# Patient Record
Sex: FEMALE | Race: WHITE | Employment: FULL TIME | URBAN - METROPOLITAN AREA
[De-identification: names, ages, dates, MRNs, and addresses within clinical notes are randomized per-mention and may not be internally consistent; named-entity substitution may affect disease eponyms.]

---

## 2019-01-10 LAB
ALBUMIN SERPL-MCNC: NORMAL G/DL
ALP BLD-CCNC: NORMAL U/L
ALT SERPL-CCNC: NORMAL U/L
ANION GAP SERPL CALCULATED.3IONS-SCNC: NORMAL MMOL/L
AST SERPL-CCNC: NORMAL U/L
AVERAGE GLUCOSE: NORMAL
BILIRUB SERPL-MCNC: NORMAL MG/DL (ref 0.1–1.4)
BUN BLDV-MCNC: NORMAL MG/DL
CALCIUM SERPL-MCNC: NORMAL MG/DL
CHLORIDE BLD-SCNC: NORMAL MMOL/L
CHOLESTEROL, TOTAL: NORMAL MG/DL
CHOLESTEROL/HDL RATIO: NORMAL
CO2: NORMAL MMOL/L
CREAT SERPL-MCNC: NORMAL MG/DL
GFR CALCULATED: NORMAL
GLUCOSE BLD-MCNC: NORMAL MG/DL
HBA1C MFR BLD: 5.2 %
HDLC SERPL-MCNC: NORMAL MG/DL (ref 35–70)
LDL CHOLESTEROL CALCULATED: NORMAL MG/DL (ref 0–160)
POTASSIUM SERPL-SCNC: NORMAL MMOL/L
SODIUM BLD-SCNC: NORMAL MMOL/L
TOTAL PROTEIN: NORMAL
TRIGL SERPL-MCNC: NORMAL MG/DL
VLDLC SERPL CALC-MCNC: NORMAL MG/DL

## 2019-08-09 LAB — HIV AG/AB: NORMAL

## 2019-10-15 ENCOUNTER — OFFICE VISIT (OUTPATIENT)
Dept: INTERNAL MEDICINE CLINIC | Age: 59
End: 2019-10-15
Payer: COMMERCIAL

## 2019-10-15 VITALS
SYSTOLIC BLOOD PRESSURE: 138 MMHG | HEART RATE: 88 BPM | WEIGHT: 155.6 LBS | RESPIRATION RATE: 16 BRPM | DIASTOLIC BLOOD PRESSURE: 72 MMHG | OXYGEN SATURATION: 98 % | HEIGHT: 61 IN | BODY MASS INDEX: 29.38 KG/M2 | TEMPERATURE: 98.8 F

## 2019-10-15 DIAGNOSIS — Z23 NEED FOR IMMUNIZATION AGAINST INFLUENZA: Primary | ICD-10-CM

## 2019-10-15 DIAGNOSIS — K90.41 NON-CELIAC GLUTEN SENSITIVITY: ICD-10-CM

## 2019-10-15 DIAGNOSIS — E03.9 ACQUIRED HYPOTHYROIDISM: ICD-10-CM

## 2019-10-15 DIAGNOSIS — R92.8 ABNORMAL MAMMOGRAM OF BOTH BREASTS: ICD-10-CM

## 2019-10-15 DIAGNOSIS — K21.9 GASTROESOPHAGEAL REFLUX DISEASE, ESOPHAGITIS PRESENCE NOT SPECIFIED: ICD-10-CM

## 2019-10-15 DIAGNOSIS — F51.01 PRIMARY INSOMNIA: ICD-10-CM

## 2019-10-15 DIAGNOSIS — F41.1 GENERALIZED ANXIETY DISORDER: ICD-10-CM

## 2019-10-15 DIAGNOSIS — K59.09 CHRONIC CONSTIPATION: ICD-10-CM

## 2019-10-15 DIAGNOSIS — E78.9 BORDERLINE HIGH CHOLESTEROL: ICD-10-CM

## 2019-10-15 PROCEDURE — 99203 OFFICE O/P NEW LOW 30 MIN: CPT | Performed by: INTERNAL MEDICINE

## 2019-10-15 PROCEDURE — 90471 IMMUNIZATION ADMIN: CPT | Performed by: INTERNAL MEDICINE

## 2019-10-15 PROCEDURE — 90686 IIV4 VACC NO PRSV 0.5 ML IM: CPT | Performed by: INTERNAL MEDICINE

## 2019-10-15 RX ORDER — CREAM BASE NO.52
CREAM (GRAM) TRANSDERMAL DAILY
COMMUNITY
Start: 2015-10-01

## 2019-10-15 RX ORDER — LEVOTHYROXINE AND LIOTHYRONINE 57; 13.5 UG/1; UG/1
90 TABLET ORAL DAILY
COMMUNITY
Start: 2014-10-01 | End: 2022-06-10 | Stop reason: DRUGHIGH

## 2019-10-15 RX ORDER — AMITRIPTYLINE HYDROCHLORIDE 25 MG/1
10 TABLET, FILM COATED ORAL DAILY
COMMUNITY
Start: 2015-11-22 | End: 2019-10-15 | Stop reason: SDUPTHER

## 2019-10-15 RX ORDER — OMEGA-3 FATTY ACIDS CAP DELAYED RELEASE 1000 MG 1000 MG
1000 CAPSULE DELAYED RELEASE ORAL DAILY
COMMUNITY

## 2019-10-15 RX ORDER — ESCITALOPRAM OXALATE 5 MG/1
5 TABLET ORAL DAILY
Qty: 90 TABLET | Refills: 1 | Status: SHIPPED | OUTPATIENT
Start: 2019-10-15 | End: 2020-03-11 | Stop reason: SDUPTHER

## 2019-10-15 RX ORDER — THIAMINE HCL 100 MG
2500 TABLET ORAL DAILY
COMMUNITY

## 2019-10-15 RX ORDER — AMITRIPTYLINE HYDROCHLORIDE 25 MG/1
12.5 TABLET, FILM COATED ORAL DAILY
Qty: 45 TABLET | Refills: 1 | Status: SHIPPED | OUTPATIENT
Start: 2019-10-15 | End: 2020-03-11 | Stop reason: SDUPTHER

## 2019-10-15 RX ORDER — POLYETHYLENE GLYCOL 3350 17 G/17G
17 POWDER, FOR SOLUTION ORAL DAILY PRN
COMMUNITY

## 2019-10-15 RX ORDER — LANSOPRAZOLE 30 MG/1
30 CAPSULE, DELAYED RELEASE ORAL DAILY
COMMUNITY
Start: 2015-11-01 | End: 2020-03-11 | Stop reason: SDUPTHER

## 2019-10-15 RX ORDER — ESCITALOPRAM OXALATE 10 MG/1
5 TABLET ORAL DAILY
COMMUNITY
End: 2019-10-15 | Stop reason: DRUGHIGH

## 2019-10-15 ASSESSMENT — PATIENT HEALTH QUESTIONNAIRE - PHQ9
SUM OF ALL RESPONSES TO PHQ9 QUESTIONS 1 & 2: 0
SUM OF ALL RESPONSES TO PHQ QUESTIONS 1-9: 0
1. LITTLE INTEREST OR PLEASURE IN DOING THINGS: 0
2. FEELING DOWN, DEPRESSED OR HOPELESS: 0
SUM OF ALL RESPONSES TO PHQ QUESTIONS 1-9: 0

## 2019-10-15 ASSESSMENT — ENCOUNTER SYMPTOMS
NAUSEA: 0
SINUS PRESSURE: 0
CONSTIPATION: 1
SHORTNESS OF BREATH: 0
ABDOMINAL PAIN: 0
COUGH: 0
VOMITING: 0
DIARRHEA: 0
BLOOD IN STOOL: 0

## 2019-10-17 ENCOUNTER — TELEPHONE (OUTPATIENT)
Dept: INTERNAL MEDICINE CLINIC | Age: 59
End: 2019-10-17

## 2020-03-11 ENCOUNTER — OFFICE VISIT (OUTPATIENT)
Dept: INTERNAL MEDICINE CLINIC | Age: 60
End: 2020-03-11
Payer: COMMERCIAL

## 2020-03-11 VITALS
BODY MASS INDEX: 30.4 KG/M2 | DIASTOLIC BLOOD PRESSURE: 82 MMHG | HEIGHT: 61 IN | WEIGHT: 161 LBS | SYSTOLIC BLOOD PRESSURE: 132 MMHG | HEART RATE: 75 BPM | TEMPERATURE: 97.7 F | OXYGEN SATURATION: 98 %

## 2020-03-11 PROCEDURE — 99396 PREV VISIT EST AGE 40-64: CPT | Performed by: INTERNAL MEDICINE

## 2020-03-11 RX ORDER — LANSOPRAZOLE 30 MG/1
30 CAPSULE, DELAYED RELEASE ORAL DAILY
Qty: 90 CAPSULE | Refills: 3 | Status: SHIPPED | OUTPATIENT
Start: 2020-03-11 | End: 2021-02-11 | Stop reason: SDUPTHER

## 2020-03-11 RX ORDER — ESCITALOPRAM OXALATE 5 MG/1
5 TABLET ORAL DAILY
Qty: 90 TABLET | Refills: 3 | Status: SHIPPED | OUTPATIENT
Start: 2020-03-11 | End: 2020-12-01 | Stop reason: SDUPTHER

## 2020-03-11 RX ORDER — AMITRIPTYLINE HYDROCHLORIDE 25 MG/1
12.5 TABLET, FILM COATED ORAL NIGHTLY
Qty: 90 TABLET | Refills: 1 | Status: SHIPPED | OUTPATIENT
Start: 2020-03-11 | End: 2021-02-11 | Stop reason: ALTCHOICE

## 2020-03-11 ASSESSMENT — ENCOUNTER SYMPTOMS
ABDOMINAL PAIN: 0
SINUS PRESSURE: 0
VOMITING: 0
DIARRHEA: 0
COUGH: 0
NAUSEA: 0
ROS SKIN COMMENTS: NO CONCERNING SKIN LESIONS
BLOOD IN STOOL: 0
CONSTIPATION: 0
BACK PAIN: 0
SHORTNESS OF BREATH: 0

## 2020-03-11 ASSESSMENT — PATIENT HEALTH QUESTIONNAIRE - PHQ9
SUM OF ALL RESPONSES TO PHQ QUESTIONS 1-9: 0
SUM OF ALL RESPONSES TO PHQ9 QUESTIONS 1 & 2: 0
SUM OF ALL RESPONSES TO PHQ QUESTIONS 1-9: 0
1. LITTLE INTEREST OR PLEASURE IN DOING THINGS: 0
2. FEELING DOWN, DEPRESSED OR HOPELESS: 0

## 2020-03-11 NOTE — PROGRESS NOTES
Spouse name: Not on file    Number of children: Not on file    Years of education: Not on file    Highest education level: Not on file   Occupational History    Not on file   Social Needs    Financial resource strain: Not on file    Food insecurity     Worry: Not on file     Inability: Not on file    Transportation needs     Medical: Not on file     Non-medical: Not on file   Tobacco Use    Smoking status: Never Smoker    Smokeless tobacco: Never Used   Substance and Sexual Activity    Alcohol use: Yes     Comment: rare    Drug use: Never    Sexual activity: Not Currently     Partners: Male   Lifestyle    Physical activity     Days per week: Not on file     Minutes per session: Not on file    Stress: Not on file   Relationships    Social connections     Talks on phone: Not on file     Gets together: Not on file     Attends Latter day service: Not on file     Active member of club or organization: Not on file     Attends meetings of clubs or organizations: Not on file     Relationship status: Not on file    Intimate partner violence     Fear of current or ex partner: Not on file     Emotionally abused: Not on file     Physically abused: Not on file     Forced sexual activity: Not on file   Other Topics Concern    Not on file   Social History Narrative    Not on file      Past Medical History:   Diagnosis Date    Anxiety     Cholestasis during pregnancy     cholestasis of pregnancy over 25 years ago     Chronic constipation     Chronic lymphocytic thyroiditis     GERD (gastroesophageal reflux disease)     Gluten intolerance     Hypothyroidism     Irritable bowel syndrome      Past Surgical History:   Procedure Laterality Date    BREAST BIOPSY Left     benign     SECTION      x1     Family History   Problem Relation Age of Onset    Heart Disease Mother     High Blood Pressure Mother     Anxiety Disorder Mother     No Known Problems Father     Alcohol Abuse Brother     Substance Abuse Brother     No Known Problems Maternal Grandmother     No Known Problems Maternal Grandfather     No Known Problems Paternal Grandmother     No Known Problems Paternal Grandfather     No Known Problems Brother     No Known Problems Brother        Review of Systems   Constitutional: Negative for activity change, fatigue and unexpected weight change. HENT: Negative for congestion, nosebleeds and sinus pressure. Eyes: Negative for visual disturbance (glasses ). Respiratory: Negative for cough and shortness of breath. Cardiovascular: Negative for chest pain, palpitations and leg swelling. Gastrointestinal: Negative for abdominal pain, blood in stool, constipation, diarrhea, nausea and vomiting. Endocrine: Positive for cold intolerance. Negative for heat intolerance. Self breast exam without masses tenderness or nipple discharge    Genitourinary: Negative for dysuria and vaginal bleeding. Musculoskeletal: Positive for arthralgias (neck, knees thumbs). Negative for back pain. Skin:        No concerning skin lesions    Neurological: Negative for dizziness, light-headedness and headaches. Psychiatric/Behavioral: Negative for decreased concentration and sleep disturbance. The patient is nervous/anxious (okay with meds ). OBJECTIVE:  /82   Pulse 75   Temp 97.7 °F (36.5 °C) (Oral)   Ht 5' 1\" (1.549 m)   Wt 161 lb (73 kg)   LMP  (LMP Unknown)   SpO2 98%   Breastfeeding No   BMI 30.42 kg/m²      Body mass index is 30.42 kg/m². Physical Exam  Vitals signs and nursing note reviewed. Constitutional:       Appearance: She is well-developed. HENT:      Head: Normocephalic and atraumatic. Eyes:      Conjunctiva/sclera: Conjunctivae normal.   Neck:      Musculoskeletal: Neck supple. Thyroid: No thyromegaly. Cardiovascular:      Rate and Rhythm: Normal rate and regular rhythm. Heart sounds: Normal heart sounds. No murmur. No friction rub.  No gallop. Pulmonary:      Effort: Pulmonary effort is normal.      Breath sounds: Normal breath sounds. Chest:      Chest wall: No tenderness. Abdominal:      General: There is no distension. Palpations: There is no mass. Tenderness: There is no abdominal tenderness. Comments: No HSM   Musculoskeletal:         General: No tenderness or deformity. Comments: Cystic lesion on top of  Left shoulder   Lymphadenopathy:      Cervical: No cervical adenopathy. Skin:     General: Skin is warm and dry. Findings: No rash. Neurological:      Mental Status: She is alert and oriented to person, place, and time. Coordination: Coordination normal.   Psychiatric:         Behavior: Behavior normal.         ASSESSMENT/PLAN:    Bravo Avalos was seen today for annual exam.    Diagnoses and all orders for this visit:    Physical exam  -     Comprehensive Metabolic Panel; Future  -     Lipid Panel; Future    Acquired hypothyroidism  -     TSH without Reflex; Future    Generalized anxiety disorder  -     escitalopram (LEXAPRO) 5 MG tablet; Take 1 tablet by mouth daily    Gastroesophageal reflux disease, esophagitis presence not specified  -     lansoprazole (PREVACID) 30 MG delayed release capsule; Take 1 capsule by mouth daily (Patient taking differently: Take 30 mg by mouth daily GABI)    Epidermoid cyst  If enlarging or causing pain to see surgeon for removal     Primary insomnia  -     amitriptyline (ELAVIL) 25 MG tablet; Take 0.5 tablets by mouth nightly [employer]  please    Abnormal mammogram of left breast  -     ANDRZEJ DIGITAL DIAGNOSTIC W OR WO CAD BILATERAL; Future    Visit for screening mammogram  Comments:   left breast abn with hx of  \"milk duct\" surgery   Orders:  -     ANDRZEJ DIGITAL DIAGNOSTIC W OR WO CAD BILATERAL;  Future        Orders Placed This Encounter   Medications    amitriptyline (ELAVIL) 25 MG tablet     Sig: Take 0.5 tablets by mouth nightly [employer]  please

## 2020-03-11 NOTE — PATIENT INSTRUCTIONS
checked during a routine doctor visit. Your doctor will tell you how often to check your blood pressure based on your age, your blood pressure results, and other factors. · Mammogram. Ask your doctor how often you should have a mammogram, which is an X-ray of your breasts. A mammogram can spot breast cancer before it can be felt and when it is easiest to treat. · Pap test and pelvic exam. Ask your doctor how often you should have a Pap test. You may not need to have a Pap test as often as you used to. · Vision. Have your eyes checked every year or two or as often as your doctor suggests. Some experts recommend that you have yearly exams for glaucoma and other age-related eye problems starting at age 48. · Hearing. Tell your doctor if you notice any change in your hearing. You can have tests to find out how well you hear. · Diabetes. Ask your doctor whether you should have tests for diabetes. · Colorectal cancer. Your risk for colorectal cancer gets higher as you get older. Some experts say that adults should start regular screening at age 48 and stop at age 76. Others say to start before age 48 or continue after age 76. Talk with your doctor about your risk and when to start and stop screening. · Thyroid disease. Talk to your doctor about whether to have your thyroid checked as part of a regular physical exam. Women have an increased chance of a thyroid problem. · Osteoporosis. You should begin tests for bone density at age 72. If you are younger than 72, ask your doctor whether you have factors that may increase your risk for this disease. You may want to have this test before age 72. · Heart attack and stroke risk. At least every 4 to 6 years, you should have your risk for heart attack and stroke assessed. Your doctor uses factors such as your age, blood pressure, cholesterol, and whether you smoke or have diabetes to show what your risk for a heart attack or stroke is over the next 10 years.   When should

## 2020-04-29 NOTE — TELEPHONE ENCOUNTER
I called pts venkatesh awan and was advised to call [employer] [facility] B#[phone] pts drug converage plan. I spoke to [facility] clinical coordinator w/ ham. [facility] spoke with Amy Garland, clinical pharmacist and was advised pt does have an active approval on file for brand Prevacid 30mg dr capsule and it does not  until 20. Cannot submit new PA until closer to expiration. Pt advised.

## 2020-06-11 ENCOUNTER — TELEPHONE (OUTPATIENT)
Dept: FAMILY MEDICINE CLINIC | Age: 60
End: 2020-06-11

## 2020-06-11 NOTE — TELEPHONE ENCOUNTER
Per previous message:  I called pts insurance lv and was advised to call [facility]  pts drug converage plan.      I spoke to [facility] clinical coordinator w/ ham. [facility] spoke with Kevin Aquino, clinical pharmacist and was advised pt does have an active approval on file for brand Prevacid 30mg dr capsule and it does not  until 20.      Cannot submit new PA until closer to expiration.

## 2020-06-11 NOTE — TELEPHONE ENCOUNTER
I tried to submit new PA for Brand Prevacid today on covermymeds but not able to submit new PA at this time, current PA does not  until 20. Jessica CASTANEDA Case ID: [id number]  There is an existing case within the [facility] environment that has the same patient, prescriber, and drug. This case must be finalized before proceeding with similar requests.

## 2020-06-16 NOTE — TELEPHONE ENCOUNTER
Spoke with insurance. Need GABI penalty exception because copay is so high. Penalty exception approved. Will receive fax. Pt advised.

## 2020-06-23 ENCOUNTER — HOSPITAL ENCOUNTER (OUTPATIENT)
Dept: WOMENS IMAGING | Age: 60
Discharge: HOME | End: 2020-06-23
Payer: COMMERCIAL

## 2020-06-23 PROCEDURE — 77063 BREAST TOMOSYNTHESIS BI: CPT

## 2020-12-01 RX ORDER — ESCITALOPRAM OXALATE 5 MG/1
5 TABLET ORAL DAILY
Qty: 90 TABLET | Refills: 1 | Status: SHIPPED | OUTPATIENT
Start: 2020-12-01 | End: 2021-07-28 | Stop reason: SDUPTHER

## 2021-01-19 ENCOUNTER — NURSE TRIAGE (OUTPATIENT)
Dept: OTHER | Facility: CLINIC | Age: 61
End: 2021-01-19

## 2021-01-19 ENCOUNTER — TELEPHONE (OUTPATIENT)
Dept: FAMILY MEDICINE CLINIC | Age: 61
End: 2021-01-19

## 2021-01-19 NOTE — TELEPHONE ENCOUNTER
----- Message from Marley Turcios sent at 1/19/2021  9:44 AM EST -----  Subject: Appointment Request    Reason for Call: Urgent Return from RN Triage    QUESTIONS  Type of Appointment? Established Patient  Reason for appointment request? No appointments available during search  Additional Information for Provider? COVID POSTIVE WILL NEED A VV AND SHE   IS LIGHTHEADED AND SICK TO HER STOMACH   ---------------------------------------------------------------------------  --------------  CALL BACK INFO  What is the best way for the office to contact you? OK to leave message on   voicemail  Preferred Call Back Phone Number? [phone]  ---------------------------------------------------------------------------  --------------  SCRIPT ANSWERS  Patient needs to be seen today or tomorrow? Yes  Nurse Name? Hung Nolen  (Did RN indicate the need for Red Scheduling?)?  Yes

## 2021-01-19 NOTE — TELEPHONE ENCOUNTER
Positive for Covid she will either need to go to the Covid clinic or we can see her at the end of the week depending on how she is doing since she lives in [place] where to allow to do a video visit

## 2021-01-19 NOTE — TELEPHONE ENCOUNTER
Nausea and dizziness, head feels like when you get car sickness started Dec 23rd. Diarrhea for 1st couple weeks, went away last week. Only have it every once in awhile. Upset stomach. Covid (+) on Tuesday (a week ago)    Reason for Disposition   Nausea lasts > 1 week    Answer Assessment - Initial Assessment Questions  1. NAUSEA SEVERITY: \"How bad is the nausea? \" (e.g., mild, moderate, severe; dehydration, weight loss)    - MILD: loss of appetite without change in eating habits    - MODERATE: decreased oral intake without significant weight loss, dehydration, or malnutrition    - SEVERE: inadequate caloric or fluid intake, significant weight loss, symptoms of dehydration        Moderate    2. ONSET: \"When did the nausea begin? \"        See above    3. VOMITING: \"Any vomiting? \" If so, ask: \"How many times today? \"        No    4. RECURRENT SYMPTOM: \"Have you had nausea before? \" If so, ask: \"When was the last time? \" \"What happened that time? \"        No    5. CAUSE: \"What do you think is causing the nausea? \"        Unsure    6. PREGNANCY: \"Is there any chance you are pregnant? \" (e.g., unprotected intercourse, missed birth control pill, broken condom)        No    Protocols used: NAUSEA-ADULT-OH    Caller provided care advice and instructed to call back with worsening symptoms. Attention Provider: Thank you for allowing me to participate in the care of your patient. The patient was connected to triage in response to information provided to the [facility]. Please do not respond through this encounter as the response is not directed to a shared pool. Warm transfer to [facility] at [facility] ([facility]). Needs VV.

## 2021-01-19 NOTE — TELEPHONE ENCOUNTER
Reason for Disposition   [1] COVID-19 infection suspected by caller or triager AND [2] mild symptoms (cough, fever, or others) AND [5] no complications or SOB    Answer Assessment - Initial Assessment Questions  1. COVID-19 DIAGNOSIS: \"Who made your Coronavirus (COVID-19) diagnosis? \" \"Was it confirmed by a positive lab test?\" If not diagnosed by a HCP, ask \"Are there lots of cases (community spread) where you live? \" (See public health department website, if unsure)     Test positive    2. COVID-19 EXPOSURE: \"Was there any known exposure to COVID before the symptoms began? \" CDC Definition of close contact: within 6 feet (2 meters) for a total of 15 minutes or more over a 24-hour period. 3. ONSET: \"When did the COVID-19 symptoms start? \"       Last week    4. WORST SYMPTOM: \"What is your worst symptom? \" (e.g., cough, fever, shortness of breath, muscle aches)      Dizziness    5. COUGH: \"Do you have a cough? \" If so, ask: \"How bad is the cough? \"          6. FEVER: \"Do you have a fever? \" If so, ask: \"What is your temperature, how was it measured, and when did it start?\"        7. RESPIRATORY STATUS: \"Describe your breathing? \" (e.g., shortness of breath, wheezing, unable to speak)       Denies    8. BETTER-SAME-WORSE: Katty Tera you getting better, staying the same or getting worse compared to yesterday? \"  If getting worse, ask, \"In what way? \"      Better except nausea and dizziness    9. HIGH RISK DISEASE: \"Do you have any chronic medical problems? \" (e.g., asthma, heart or lung disease, weak immune system, obesity, etc.)      Denies    10. PREGNANCY: \"Is there any chance you are pregnant? \" \"When was your last menstrual period? \"        Denies    11. OTHER SYMPTOMS: \"Do you have any other symptoms? \"  (e.g., chills, fatigue, headache, loss of smell or taste, muscle pain, sore throat; new loss of smell or taste especially support the diagnosis of COVID-19)        Nausea, dizziness    Protocols used: CORONAVIRUS (COVID-19) DIAGNOSED OR SUSPECTED-ADULT-AH    Patient called pre-service center [facility]) Sara with red flag complaint. Brief description of triage: as above covid positive last week all symptoms better except nausea and dizziness    Triage indicates for patient to be seen today    Care advice provided, patient verbalizes understanding; denies any other questions or concerns; instructed to call back for any new or worsening symptoms. Writer provided warm transfer to Vi Peña  at [facility] for appointment scheduling. Attention Provider: Thank you for allowing me to participate in the care of your patient. The patient was connected to triage in response to information provided to the [facility]. Please do not respond through this encounter as the response is not directed to a shared pool.

## 2021-01-19 NOTE — TELEPHONE ENCOUNTER
The patient needs to be scheduled at a red clinic for her issues. We are unable to do a Video Visit for this pt.

## 2021-01-19 NOTE — TELEPHONE ENCOUNTER
----- Message from Nati Howard sent at 1/19/2021  2:27 PM EST -----  Subject: Appointment Request    Reason for Call: Immediate Return from RN Triage    QUESTIONS  Type of Appointment? Established Patient  Reason for appointment request? Other - Virtual Visit needed [place]  Additional Information for Provider? (FYI only) Return from triage for   persistent nausea and dizziness since 12/23/20. Pt COVID+ last Tuesday 01/12/21. Attempted warm tx to office to schedule but was told cannot   schedule pt due to pt living in [place]. NT directed pt to go to urgent care if   unable to VV with PCP.   ---------------------------------------------------------------------------  --------------  CALL BACK INFO  What is the best way for the office to contact you? OK to leave message on   voicemail  Preferred Call Back Phone Number? [phone]  ---------------------------------------------------------------------------  --------------  SCRIPT ANSWERS  Patient needs to be seen today? Yes  Nurse Name? Maryuri Goss  (Did RN indicate the need for Red Scheduling?)?  Yes

## 2021-01-19 NOTE — TELEPHONE ENCOUNTER
Pt has had diarrhea, nausea, dizziness since Dec. 23, 2020, she went to get tested for covid - came back negative. Pt will still having the symptoms, but then lost her taste/smell, got tested again and it came back positive. Pt is still having the nausea, dizziness - this is NO better and would like to know if she could be prescribed somethingfor this?     Pl advise  [phone] (home)         Evelyn Read

## 2021-02-11 ENCOUNTER — OFFICE VISIT (OUTPATIENT)
Dept: FAMILY MEDICINE CLINIC | Age: 61
End: 2021-02-11
Payer: COMMERCIAL

## 2021-02-11 VITALS
HEART RATE: 70 BPM | BODY MASS INDEX: 31.26 KG/M2 | SYSTOLIC BLOOD PRESSURE: 126 MMHG | DIASTOLIC BLOOD PRESSURE: 76 MMHG | TEMPERATURE: 97.2 F | WEIGHT: 165.6 LBS | HEIGHT: 61 IN | OXYGEN SATURATION: 98 %

## 2021-02-11 DIAGNOSIS — B00.9 RECURRENT HERPES SIMPLEX: ICD-10-CM

## 2021-02-11 DIAGNOSIS — K29.70 GASTRITIS WITHOUT BLEEDING, UNSPECIFIED CHRONICITY, UNSPECIFIED GASTRITIS TYPE: ICD-10-CM

## 2021-02-11 DIAGNOSIS — K90.41 GLUTEN INTOLERANCE: ICD-10-CM

## 2021-02-11 DIAGNOSIS — Z00.00 PHYSICAL EXAM: Primary | ICD-10-CM

## 2021-02-11 DIAGNOSIS — Z12.11 COLON CANCER SCREENING: ICD-10-CM

## 2021-02-11 DIAGNOSIS — E03.8 HYPOTHYROIDISM DUE TO HASHIMOTO'S THYROIDITIS: ICD-10-CM

## 2021-02-11 DIAGNOSIS — K58.1 IRRITABLE BOWEL SYNDROME WITH CONSTIPATION: ICD-10-CM

## 2021-02-11 DIAGNOSIS — E06.3 HYPOTHYROIDISM DUE TO HASHIMOTO'S THYROIDITIS: ICD-10-CM

## 2021-02-11 DIAGNOSIS — Z23 NEED FOR INFLUENZA VACCINATION: ICD-10-CM

## 2021-02-11 PROCEDURE — 99396 PREV VISIT EST AGE 40-64: CPT | Performed by: INTERNAL MEDICINE

## 2021-02-11 PROCEDURE — 90471 IMMUNIZATION ADMIN: CPT | Performed by: INTERNAL MEDICINE

## 2021-02-11 PROCEDURE — 90686 IIV4 VACC NO PRSV 0.5 ML IM: CPT | Performed by: INTERNAL MEDICINE

## 2021-02-11 RX ORDER — VALACYCLOVIR HYDROCHLORIDE 500 MG/1
500 TABLET, FILM COATED ORAL DAILY
Qty: 90 TABLET | Refills: 0 | Status: SHIPPED | OUTPATIENT
Start: 2021-02-11 | End: 2021-04-15 | Stop reason: SDUPTHER

## 2021-02-11 RX ORDER — LANSOPRAZOLE 30 MG/1
30 CAPSULE, DELAYED RELEASE ORAL DAILY
Qty: 30 CAPSULE | Refills: 3 | Status: SHIPPED | OUTPATIENT
Start: 2021-02-11 | End: 2021-02-11 | Stop reason: CLARIF

## 2021-02-11 RX ORDER — LANSOPRAZOLE 30 MG
30 CAPSULE,DELAYED RELEASE (ENTERIC COATED) ORAL DAILY
Qty: 90 CAPSULE | Refills: 3 | Status: SHIPPED | OUTPATIENT
Start: 2021-02-11 | End: 2021-02-11 | Stop reason: CLARIF

## 2021-02-11 RX ORDER — LANSOPRAZOLE 30 MG
30 CAPSULE,DELAYED RELEASE (ENTERIC COATED) ORAL DAILY
Qty: 90 CAPSULE | Refills: 3 | Status: SHIPPED | OUTPATIENT
Start: 2021-02-11 | End: 2021-02-11 | Stop reason: SDUPTHER

## 2021-02-11 RX ORDER — LANSOPRAZOLE 30 MG
30 CAPSULE,DELAYED RELEASE (ENTERIC COATED) ORAL DAILY
Qty: 90 CAPSULE | Refills: 3 | Status: SHIPPED | OUTPATIENT
Start: 2021-02-11 | End: 2022-01-31 | Stop reason: SDUPTHER

## 2021-02-11 SDOH — ECONOMIC STABILITY: INCOME INSECURITY: HOW HARD IS IT FOR YOU TO PAY FOR THE VERY BASICS LIKE FOOD, HOUSING, MEDICAL CARE, AND HEATING?: NOT HARD AT ALL

## 2021-02-11 SDOH — ECONOMIC STABILITY: FOOD INSECURITY: WITHIN THE PAST 12 MONTHS, YOU WORRIED THAT YOUR FOOD WOULD RUN OUT BEFORE YOU GOT MONEY TO BUY MORE.: NEVER TRUE

## 2021-02-11 SDOH — ECONOMIC STABILITY: TRANSPORTATION INSECURITY
IN THE PAST 12 MONTHS, HAS LACK OF TRANSPORTATION KEPT YOU FROM MEETINGS, WORK, OR FROM GETTING THINGS NEEDED FOR DAILY LIVING?: NO

## 2021-02-11 ASSESSMENT — ENCOUNTER SYMPTOMS
BLOOD IN STOOL: 0
ABDOMINAL DISTENTION: 0
DIARRHEA: 1
ABDOMINAL PAIN: 1
TROUBLE SWALLOWING: 0
NAUSEA: 1
CONSTIPATION: 1
COUGH: 0
VOMITING: 0
SHORTNESS OF BREATH: 0
SINUS PRESSURE: 0
ROS SKIN COMMENTS: NO CONCERNING SKIN LESIONS

## 2021-02-11 ASSESSMENT — PATIENT HEALTH QUESTIONNAIRE - PHQ9
SUM OF ALL RESPONSES TO PHQ9 QUESTIONS 1 & 2: 0
SUM OF ALL RESPONSES TO PHQ QUESTIONS 1-9: 0
SUM OF ALL RESPONSES TO PHQ QUESTIONS 1-9: 0

## 2021-02-11 NOTE — PROGRESS NOTES
Vaccine Information Sheet, \"Influenza - Inactivated\"  given to [facility], or parent/legal guardian of  [facility] and verbalized understanding. Patient responses:    Have you ever had a reaction to a flu vaccine? No  Do you have any current illness? No  Have you ever had [place] Syndrome? No  Do you have a serious allergy to any of the follow: Neomycin, Polymyxin, Thimerosal, eggs or egg products? No    Flu vaccine given per order. Please see immunization tab. Risks and benefits explained. Current VIS given.

## 2021-02-11 NOTE — PROGRESS NOTES
mouth daily       Omega-3 Fatty Acids (FISH OIL) 1000 MG CPDR Take 1,000 mg by mouth daily       NONFORMULARY ProGad Enhancer      NONFORMULARY L-5MTF-daily      NONFORMULARY Pro DHA 1000-daily      NONFORMULARY Take 1 tablet by mouth A-CYSTEINE/ARG ZN/GLUT/MV-MN (L GLUTAMINE-N ACET-ARG-VIT-MIN ORAL)-daily      PREVACID 30 MG delayed release capsule Take 1 capsule by mouth daily 90 capsule 3     No current facility-administered medications for this visit.           Social History     Socioeconomic History    Marital status:      Spouse name: Not on file    Number of children: Not on file    Years of education: Not on file    Highest education level: Not on file   Occupational History    Not on file   Social Needs    Financial resource strain: Not hard at all    Food insecurity     Worry: Never true     Inability: Never true   [employer] needs     Medical: No     Non-medical: No   Tobacco Use    Smoking status: Never Smoker    Smokeless tobacco: Never Used   Substance and Sexual Activity    Alcohol use: Yes     Comment: rare    Drug use: Never    Sexual activity: Not Currently     Partners: Male   Lifestyle    Physical activity     Days per week: Not on file     Minutes per session: Not on file    Stress: Not on file   Relationships    Social connections     Talks on phone: Not on file     Gets together: Not on file     Attends Uats[employer] service: Not on file     Active member of club or organization: Not on file     Attends meetings of clubs or organizations: Not on file     Relationship status: Not on file    Intimate partner violence     Fear of current or ex partner: Not on file     Emotionally abused: Not on file     Physically abused: Not on file     Forced sexual activity: Not on file   Other Topics Concern    Not on file   Social History Narrative    Not on file      Past Medical History:   Diagnosis Date    Anxiety     Cholestasis during pregnancy     cholestasis of pregnancy over 25 years ago     Chronic constipation     Chronic lymphocytic thyroiditis     GERD (gastroesophageal reflux disease)     Gluten intolerance     Hypothyroidism     Irritable bowel syndrome      Past Surgical History:   Procedure Laterality Date    BREAST BIOPSY Left     benign     SECTION      x1     Family History   Problem Relation Age of Onset    Heart Disease Mother         sudden death 61    High Blood Pressure Mother     Anxiety Disorder Mother     No Known Problems Father     Alcohol Abuse Brother     Substance Abuse Brother     No Known Problems Maternal Grandmother     No Known Problems Maternal Grandfather     No Known Problems Paternal Grandmother     No Known Problems Paternal Grandfather     No Known Problems Brother     No Known Problems Brother      Review of Systems   Constitutional: Negative for activity change, fatigue and unexpected weight change (unable to lose but stable ). HENT: Negative for congestion, nosebleeds, sinus pressure and trouble swallowing. Eyes: Negative for visual disturbance (glasses  Starting glaucoma in right eye ). Respiratory: Negative for cough and shortness of breath. Cardiovascular: Negative for chest pain, palpitations and leg swelling. Gastrointestinal: Positive for abdominal pain, constipation, diarrhea and nausea. Negative for abdominal distention, blood in stool and vomiting. Endocrine: Positive for cold intolerance. Self breast exams negative    Genitourinary: Negative for dysuria and vaginal bleeding. Musculoskeletal: Negative for gait problem. Skin:        No concerning skin lesions    Neurological: Positive for dizziness and light-headedness. Negative for headaches. Psychiatric/Behavioral: The patient is nervous/anxious.         OBJECTIVE:  /76   Pulse 70   Temp 97.2 °F (36.2 °C) (Temporal)   Ht 5' 1\" (1.549 m)   Wt 165 lb 9.6 oz (75.1 kg)   LMP  (LMP Unknown)   SpO2 98% Breastfeeding No   BMI 31.29 kg/m²      Body mass index is 31.29 kg/m². Physical Exam  Vitals signs and nursing note reviewed. Constitutional:       Appearance: Normal appearance. She is well-developed. HENT:      Head: Normocephalic and atraumatic. Right Ear: Tympanic membrane and ear canal normal.      Left Ear: Tympanic membrane and ear canal normal.      Nose: Nose normal.      Mouth/Throat:      Pharynx: Oropharynx is clear. Eyes:      Conjunctiva/sclera: Conjunctivae normal.   Neck:      Musculoskeletal: Neck supple. Thyroid: No thyromegaly. Vascular: No carotid bruit. Cardiovascular:      Rate and Rhythm: Normal rate and regular rhythm. Heart sounds: Normal heart sounds. No murmur. No friction rub. No gallop. Pulmonary:      Effort: Pulmonary effort is normal.      Breath sounds: Normal breath sounds. Chest:      Chest wall: No tenderness. Abdominal:      General: Bowel sounds are normal. There is no distension. Palpations: Abdomen is soft. Tenderness: There is no abdominal tenderness. Comments: No HSM   Musculoskeletal:         General: No swelling. Lymphadenopathy:      Cervical: No cervical adenopathy. Skin:     General: Skin is warm and dry. Findings: No rash. Neurological:      General: No focal deficit present. Mental Status: She is alert. Gait: Gait normal.   Psychiatric:         Behavior: Behavior normal.         Thought Content: Thought content normal.         ASSESSMENT/PLAN:    THOM was seen today for check-up. Diagnoses and all orders for this visit:    Physical exam    Gluten intolerance follows diet     Gastritis without bleeding, unspecified chronicity, unspecified gastritis type  -      lansoprazole (PREVACID) 30 MG delayed release capsule; Take 1 capsule by mouth daily GABI name brand      Irritable bowel syndrome with constipation    Recurrent herpes simplex  -     valACYclovir (VALTREX) 500 MG tablet;  Take 1 tablet by mouth daily    Hypothyroidism due to Hashimoto's thyroiditis    Colon cancer screening  -     [facility] - Tia Bingham MD, Gastroenterology, [facility] - [place], [place]    Need for influenza vaccination  -     INFLUENZA, QUADV, 3 YRS AND OLDER, IM PF, PREFILL SYR OR SDV, 0.5ML ([facility], PF)    Orders Placed This Encounter   Medications    DISCONTD: PREVACID 30 MG delayed release capsule     Sig: Take 1 capsule by mouth daily     Dispense:  90 capsule     Refill:  3    valACYclovir (VALTREX) 500 MG tablet     Sig: Take 1 tablet by mouth daily     Dispense:  90 tablet     Refill:  0    DISCONTD: lansoprazole (PREVACID) 30 MG delayed release capsule     Sig: Take 1 capsule by mouth daily GABI name brand     Dispense:  30 capsule     Refill:  3        Return in 1 year (on 2/11/2022), or if symptoms worsen or fail to improve, for physical .     There are no Patient Instructions on file for this visit.

## 2021-04-15 DIAGNOSIS — B00.9 RECURRENT HERPES SIMPLEX: ICD-10-CM

## 2021-04-15 RX ORDER — VALACYCLOVIR HYDROCHLORIDE 500 MG/1
500 TABLET, FILM COATED ORAL DAILY
Qty: 90 TABLET | Refills: 1 | Status: SHIPPED | OUTPATIENT
Start: 2021-04-15 | End: 2022-02-17 | Stop reason: SDUPTHER

## 2021-04-28 ENCOUNTER — TELEPHONE (OUTPATIENT)
Dept: FAMILY MEDICINE CLINIC | Age: 61
End: 2021-04-28

## 2021-04-28 DIAGNOSIS — R92.8 ABNORMAL MAMMOGRAM: Primary | ICD-10-CM

## 2021-05-10 ENCOUNTER — HOSPITAL ENCOUNTER (OUTPATIENT)
Dept: WOMENS IMAGING | Age: 61
Discharge: HOME | End: 2021-05-10
Payer: COMMERCIAL

## 2021-05-10 ENCOUNTER — APPOINTMENT (OUTPATIENT)
Dept: ULTRASOUND IMAGING | Age: 61
End: 2021-05-10
Payer: COMMERCIAL

## 2021-05-10 DIAGNOSIS — Z12.31 BREAST CANCER SCREENING BY MAMMOGRAM: ICD-10-CM

## 2021-05-10 DIAGNOSIS — R92.8 ABNORMAL MAMMOGRAM: ICD-10-CM

## 2021-05-10 PROCEDURE — G0279 TOMOSYNTHESIS, MAMMO: HCPCS

## 2021-06-08 ENCOUNTER — OFFICE VISIT (OUTPATIENT)
Dept: FAMILY MEDICINE CLINIC | Age: 61
End: 2021-06-08
Payer: COMMERCIAL

## 2021-06-08 ENCOUNTER — HOSPITAL ENCOUNTER (OUTPATIENT)
Dept: GENERAL RADIOLOGY | Age: 61
Discharge: HOME | End: 2021-06-08
Payer: COMMERCIAL

## 2021-06-08 ENCOUNTER — HOSPITAL ENCOUNTER (OUTPATIENT)
Age: 61
Discharge: HOME | End: 2021-06-08
Payer: COMMERCIAL

## 2021-06-08 VITALS
DIASTOLIC BLOOD PRESSURE: 80 MMHG | SYSTOLIC BLOOD PRESSURE: 134 MMHG | TEMPERATURE: 97.6 F | HEART RATE: 75 BPM | BODY MASS INDEX: 31.23 KG/M2 | OXYGEN SATURATION: 98 % | WEIGHT: 165.4 LBS | HEIGHT: 61 IN

## 2021-06-08 DIAGNOSIS — M54.2 NECK PAIN: ICD-10-CM

## 2021-06-08 DIAGNOSIS — Z12.11 COLON CANCER SCREENING: ICD-10-CM

## 2021-06-08 DIAGNOSIS — M54.12 CERVICAL RADICULOPATHY: ICD-10-CM

## 2021-06-08 DIAGNOSIS — M54.2 NECK PAIN: Primary | ICD-10-CM

## 2021-06-08 PROCEDURE — 72040 X-RAY EXAM NECK SPINE 2-3 VW: CPT

## 2021-06-08 PROCEDURE — 99213 OFFICE O/P EST LOW 20 MIN: CPT | Performed by: INTERNAL MEDICINE

## 2021-06-08 NOTE — PROGRESS NOTES
SUBJECTIVE:  Stephania Hanley is a 57 y.o. female being evaluated for:    Chief Complaint   Patient presents with    Neck Pain       On the top of the spine  neck pain 3/10 .        HPI   Neck pain for about a year and it is getting worse  Pain in her mid neck  Gets messages and comes off the table   NO pain shooting down her arm  ONe spot Worse with hunching over the computer  Pain starting to radiate across her left upper scapula  No gi symptoms  Message does not help Chiropractic in back not better     Allergies   Allergen Reactions    Atorvastatin      Muscle aches     Codeine Itching    Gluten Meal     Simvastatin      Muscle aches       Current Outpatient Medications   Medication Sig Dispense Refill    valACYclovir (VALTREX) 500 MG tablet Take 1 tablet by mouth daily 90 tablet 1    ZINC PO Take 1 tablet by mouth daily      Ascorbic Acid (VITAMIN C PO) Take 1 tablet by mouth daily      Probiotic Product (PROBIOTIC PO) Take 1 capsule by mouth daily      PREVACID 30 MG delayed release capsule Take 1 capsule by mouth daily 90 capsule 3    escitalopram (LEXAPRO) 5 MG tablet Take 1 tablet by mouth daily 90 tablet 1    thyroid (ARMOUR THYROID) 90 MG tablet Take 90 mcg by mouth daily Take whole tablet daily alternating with 1/2 tablet daily      Hormone Cream Base (HRT CREAM BASE) CREA daily      polyethylene glycol (GLYCOLAX) packet Take 17 g by mouth daily as needed for Constipation      Cyanocobalamin (VITAMIN B-12) 2500 MCG SUBL Place 2,500 mcg under the tongue daily      Cholecalciferol (VITAMIN D3) 5000 units TABS Take 1 tablet by mouth daily       Omega-3 Fatty Acids (FISH OIL) 1000 MG CPDR Take 1,000 mg by mouth daily       NONFORMULARY ProGad Enhancer      NONFORMULARY L-5MTF-daily      NONFORMULARY Pro DHA 1000-daily      NONFORMULARY Take 1 tablet by mouth A-CYSTEINE/ARG ZN/GLUT/MV-MN (L GLUTAMINE-N ACET-ARG-VIT-MIN ORAL)-daily       No current facility-administered medications for this visit. Social History     Socioeconomic History    Marital status:      Spouse name: Not on file    Number of children: Not on file    Years of education: Not on file    Highest education level: Not on file   Occupational History    Not on file   Tobacco Use    Smoking status: Never Smoker    Smokeless tobacco: Never Used   Vaping Use    Vaping Use: Never used   Substance and Sexual Activity    Alcohol use: Yes     Comment: rare    Drug use: Never    Sexual activity: Not Currently     Partners: Male   Other Topics Concern    Not on file   Social History Narrative    Not on file     Social Determinants of Health     Financial Resource Strain: Low Risk     Difficulty of Paying Living Expenses: Not hard at all   Food Insecurity: No Food Insecurity    Worried About Running Out of Food in the Last Year: Never true    Yvonne of Food in the Last Year: Never true   Transportation Needs: No Transportation Needs    Lack of Transportation (Medical): No    Lack of Transportation (Non-Medical):  No   Physical Activity:     Days of Exercise per Week:     Minutes of Exercise per Session:    Stress:     Feeling of Stress :    Social Connections:     Frequency of Communication with Friends and Family:     Frequency of Social Gatherings with Friends and Family:     Attends Spiritism Services:     Active Member of Clubs or Organizations:     Attends Club or Organization Meetings:     Marital Status:    Intimate Partner Violence:     Fear of Current or Ex-Partner:     Emotionally Abused:     Physically Abused:     Sexually Abused:       Past Medical History:   Diagnosis Date    Anxiety     Cholestasis during pregnancy     cholestasis of pregnancy over 25 years ago     Chronic constipation     Chronic lymphocytic thyroiditis     GERD (gastroesophageal reflux disease)     Gluten intolerance     Hypothyroidism     Irritable bowel syndrome      Past Surgical History:   Procedure Laterality Date    BREAST BIOPSY Left     benign     SECTION      x1       Review of Systems   Respiratory: Negative for cough and shortness of breath. Cardiovascular: Negative for chest pain, palpitations and leg swelling. Gastrointestinal: Negative for abdominal pain, blood in stool, diarrhea, nausea and vomiting. Genitourinary: Negative for dysuria. Musculoskeletal: Positive for back pain and neck pain. Neurological: Negative for dizziness, numbness and headaches. No radiculopathy       OBJECTIVE:  /80 (Site: Left Upper Arm, Position: Sitting, Cuff Size: Medium Adult)   Pulse 75   Temp 97.6 °F (36.4 °C) (Temporal)   Ht 5' 1\" (1.549 m)   Wt 165 lb 6.4 oz (75 kg)   LMP  (LMP Unknown)   SpO2 98%   BMI 31.25 kg/m²      Body mass index is 31.25 kg/m². Physical Exam  Vitals and nursing note reviewed. Constitutional:       Appearance: Normal appearance. She is well-developed. HENT:      Head: Normocephalic and atraumatic. Nose: Nose normal.      Mouth/Throat:      Pharynx: Oropharynx is clear. Eyes:      Conjunctiva/sclera: Conjunctivae normal.   Neck:      Thyroid: No thyromegaly. Vascular: No carotid bruit. Cardiovascular:      Rate and Rhythm: Normal rate and regular rhythm. Heart sounds: Normal heart sounds. No murmur heard. No friction rub. No gallop. Pulmonary:      Effort: Pulmonary effort is normal.      Breath sounds: Normal breath sounds. Chest:      Chest wall: No tenderness. Abdominal:      General: Bowel sounds are normal. There is no distension. Palpations: Abdomen is soft. Tenderness: There is no abdominal tenderness. Comments: No HSM   Musculoskeletal:         General: No swelling. Cervical back: Neck supple. Tenderness present. Lymphadenopathy:      Cervical: No cervical adenopathy. Skin:     General: Skin is warm and dry. Findings: No rash. Neurological:      General: No focal deficit present. Mental Status: She is alert. Gait: Gait normal.   Psychiatric:         Behavior: Behavior normal.         Thought Content: Thought content normal.         ASSESSMENT/PLAN:    THOM was seen today for neck pain. Diagnoses and all orders for this visit:    Neck pain  -     XR CERVICAL SPINE (2-3 VIEWS); Future    Cervical radiculopathy  -     XR CERVICAL SPINE (2-3 VIEWS); Future    Colon cancer screening  -     [place] - Johnny Carpenter MD, [place]astroenterology, [facility] [place]        No orders of the defined types were placed in this encounter. Return if symptoms worsen or fail to improve. Patient Instructions       Patient Education        Neck: Exercises  Introduction  Here are some examples of exercises for you to try. The exercises may be suggested for a condition or for rehabilitation. Start each exercise slowly. Ease off the exercises if you start to have pain. You will be told when to start these exercises and which ones will work best for you. How to do the exercises  Neck stretch   1. This stretch works best if you keep your shoulder down as you lean away from it. To help you remember to do this, start by relaxing your shoulders and lightly holding on to your thighs or your chair. 2. Tilt your head toward your shoulder and hold for 15 to 30 seconds. Let the weight of your head stretch your muscles. 3. If you would like a little added stretch, use your hand to gently and steadily pull your head toward your shoulder. For example, keeping your right shoulder down, lean your head to the left. 4. Repeat 2 to 4 times toward each shoulder. Diagonal neck stretch   1. Turn your head slightly toward the direction you will be stretching, and tilt your head diagonally toward your chest and hold for 15 to 30 seconds. 2. If you would like a little added stretch, use your hand to gently and steadily pull your head forward on the diagonal.  3. Repeat 2 to 4 times toward each side.     [employer] disclaims any warranty or liability for your use of this information.

## 2021-06-08 NOTE — PATIENT INSTRUCTIONS
Patient Education        Neck: Exercises  Introduction  Here are some examples of exercises for you to try. The exercises may be suggested for a condition or for rehabilitation. Start each exercise slowly. Ease off the exercises if you start to have pain. You will be told when to start these exercises and which ones will work best for you. How to do the exercises  Neck stretch   1. This stretch works best if you keep your shoulder down as you lean away from it. To help you remember to do this, start by relaxing your shoulders and lightly holding on to your thighs or your chair. 2. Tilt your head toward your shoulder and hold for 15 to 30 seconds. Let the weight of your head stretch your muscles. 3. If you would like a little added stretch, use your hand to gently and steadily pull your head toward your shoulder. For example, keeping your right shoulder down, lean your head to the left. 4. Repeat 2 to 4 times toward each shoulder. Diagonal neck stretch   1. Turn your head slightly toward the direction you will be stretching, and tilt your head diagonally toward your chest and hold for 15 to 30 seconds. 2. If you would like a little added stretch, use your hand to gently and steadily pull your head forward on the diagonal.  3. Repeat 2 to 4 times toward each side. Dorsal glide stretch   The dorsal glide stretches the back of the neck. If you feel pain, do not glide so far back. Some people find this exercise easier to do while lying on their backs with an ice pack on the neck. 1. Sit or stand tall and look straight ahead. 2. Slowly tuck your chin as you glide your head backward over your body  3. Hold for a count of 6, and then relax for up to 10 seconds. 4. Repeat 8 to 12 times. Chest and shoulder stretch   1. Sit or stand tall and glide your head backward as in the dorsal glide stretch. 2. Raise both arms so that your hands are next to your ears.   3. Take a deep breath, and as you breathe out, lower your elbows down and behind your back. You will feel your shoulder blades slide down and together, and at the same time you will feel a stretch across your chest and the front of your shoulders. 4. Hold for about 6 seconds, and then relax for up to 10 seconds. 5. Repeat 8 to 12 times. Strengthening: Hands on head   1. Move your head backward, forward, and side to side against gentle pressure from your hands, holding each position for about 6 seconds. 2. Repeat 8 to 12 times. Follow-up care is a key part of your treatment and safety. Be sure to make and go to all appointments, and call your doctor if you are having problems. It's also a good idea to know your test results and keep a list of the medicines you take. Where can you learn more? Go to https://[email or web address][email or web address]. org and sign in to your [employer] account. Enter P975 in the [employer] box to learn more about \"Neck: Exercises. \"     If you do not have an account, please click on the \"Sign Up Now\" link. Current as of: November 16, 2020               Content Version: 12.8  © 6955-0980 [employer] Incorporated. Care instructions adapted under license by [facility] ([facility]). If you have questions about a medical condition or this instruction, always ask your healthcare professional. [address] any warranty or liability for your use of this information.

## 2021-06-09 DIAGNOSIS — M50.90 CERVICAL DISC DISEASE: Primary | ICD-10-CM

## 2021-06-13 ASSESSMENT — ENCOUNTER SYMPTOMS
COUGH: 0
BACK PAIN: 1
BLOOD IN STOOL: 0
NAUSEA: 0
SHORTNESS OF BREATH: 0
ABDOMINAL PAIN: 0
DIARRHEA: 0
VOMITING: 0

## 2021-07-28 DIAGNOSIS — F41.1 GENERALIZED ANXIETY DISORDER: ICD-10-CM

## 2021-07-28 RX ORDER — ESCITALOPRAM OXALATE 5 MG/1
5 TABLET ORAL DAILY
Qty: 90 TABLET | Refills: 1 | Status: SHIPPED | OUTPATIENT
Start: 2021-07-28 | End: 2022-02-17 | Stop reason: ALTCHOICE

## 2021-08-17 NOTE — PROGRESS NOTES
[address] [facility] time_______8/20/21 0700_____        Surgery time_____0800_______    Take the following medications with a sip of water:    Do not eat or drink anything after 12:00 midnight prior to your surgery. This includes water chewing gum, mints and ice chips. You may brush your teeth and gargle the morning of your surgery, but do not swallow the water     Please see your family doctor/pediatrician for a history and physical and/or concerning medications. Bring any test results/reports from your physicians office. If you are under the care of a heart doctor or specialist doctor, please be aware that you may be asked to them for clearance    You may be asked to stop blood thinners such as Coumadin, Plavix, Fragmin, Lovenox, etc., or any anti-inflammatories such as:  Aspirin, Ibuprofen, Advil, Naproxen prior to your surgery. We also ask that you stop any OTC medications such as fish oil, vitamin E, glucosamine, garlic, Multivitamins, COQ 10, etc.    We ask that you do not smoke 24 hours prior to surgery  We ask that you do not  drink any alcoholic beverages 24 hours prior to surgery     You must make arrangements for a responsible adult to take you home after your surgery. For your safety you will not be allowed to leave alone or drive yourself home. Your surgery will be cancelled if you do not have a ride home. Also for your safety, it is strongly suggested that someone stay with you the first 24 hours after your surgery. A parent or legal guardian must accompany a child scheduled for surgery and plan to stay at the hospital until the child is discharged. Please do not bring other children with you. For your comfort, please wear simple loose fitting clothing to the hospital.  Please do not bring valuables.     Do not wear any make-up or nail polish on your fingers or toes      For your safety, please do not wear any jewelry or body piercing's on the day of surgery. All jewelry must be removed. If you have dentures, they will be removed before going to operating room. For your convenience, we will provide you with a container. If you wear contact lenses or glasses, they will be removed, please bring a case for them. If you have a living will and a durable power of  for healthcare, please bring in a copy. As part of our patient safety program to minimize surgical site infections, we ask you to do the following:    · Please notify your surgeon if you develop any illness between         now and the  day of your surgery. · This includes a cough, cold, fever, sore throat, nausea,         or vomiting, and diarrhea, etc.  ·  Please notify your surgeon if you experience dizziness, shortness         of breath or blurred vision between now and the time of your surgery. Do not shave your operative site 96 hours prior to surgery. For face and neck surgery, men may use an electric razor 48 hours   prior to surgery. You may shower the night before surgery or the morning of   your surgery with an antibacterial soap. You will need to bring a photo ID and insurance card    [facility] has an onsite pharmacy, would you like to utilize our pharmacy     If you will be staying overnight and use a C-pap machine, please bring   your C-pap to hospital     Our goal is to provide you with excellent care, therefore, visitors will be limited to two(2) in the room at a time so that we may focus on providing this care for you. Please contact pre-admission testing if you have any further questions. [facility] phone number:  [phone]  Please note these are generalized instructions for all surgical cases, you may be provided with more specific instructions according to your surgery.

## 2021-08-19 ENCOUNTER — ANESTHESIA EVENT (OUTPATIENT)
Dept: ENDOSCOPY | Age: 61
End: 2021-08-19
Payer: COMMERCIAL

## 2021-08-20 ENCOUNTER — HOSPITAL ENCOUNTER (OUTPATIENT)
Age: 61
Setting detail: OUTPATIENT SURGERY
Discharge: HOME | End: 2021-08-20
Attending: INTERNAL MEDICINE | Admitting: INTERNAL MEDICINE
Payer: COMMERCIAL

## 2021-08-20 ENCOUNTER — ANESTHESIA (OUTPATIENT)
Dept: ENDOSCOPY | Age: 61
End: 2021-08-20
Payer: COMMERCIAL

## 2021-08-20 VITALS
TEMPERATURE: 97.1 F | HEIGHT: 61 IN | RESPIRATION RATE: 16 BRPM | HEART RATE: 70 BPM | BODY MASS INDEX: 29.83 KG/M2 | DIASTOLIC BLOOD PRESSURE: 74 MMHG | SYSTOLIC BLOOD PRESSURE: 137 MMHG | WEIGHT: 158 LBS | OXYGEN SATURATION: 100 %

## 2021-08-20 VITALS — SYSTOLIC BLOOD PRESSURE: 163 MMHG | DIASTOLIC BLOOD PRESSURE: 84 MMHG | OXYGEN SATURATION: 99 %

## 2021-08-20 PROCEDURE — 7100000010 HC PHASE II RECOVERY - FIRST 15 MIN: Performed by: INTERNAL MEDICINE

## 2021-08-20 PROCEDURE — 3700000001 HC ADD 15 MINUTES (ANESTHESIA): Performed by: INTERNAL MEDICINE

## 2021-08-20 PROCEDURE — 6360000002 HC RX W HCPCS: Performed by: NURSE ANESTHETIST, CERTIFIED REGISTERED

## 2021-08-20 PROCEDURE — 2500000003 HC RX 250 WO HCPCS: Performed by: NURSE ANESTHETIST, CERTIFIED REGISTERED

## 2021-08-20 PROCEDURE — 3609027000 HC COLONOSCOPY: Performed by: INTERNAL MEDICINE

## 2021-08-20 PROCEDURE — 7100000011 HC PHASE II RECOVERY - ADDTL 15 MIN: Performed by: INTERNAL MEDICINE

## 2021-08-20 PROCEDURE — 2580000003 HC RX 258: Performed by: ANESTHESIOLOGY

## 2021-08-20 PROCEDURE — 3700000000 HC ANESTHESIA ATTENDED CARE: Performed by: INTERNAL MEDICINE

## 2021-08-20 RX ORDER — PROPOFOL 10 MG/ML
INJECTION, EMULSION INTRAVENOUS CONTINUOUS PRN
Status: DISCONTINUED | OUTPATIENT
Start: 2021-08-20 | End: 2021-08-20 | Stop reason: SDUPTHER

## 2021-08-20 RX ORDER — LIDOCAINE HYDROCHLORIDE 20 MG/ML
INJECTION, SOLUTION INFILTRATION; PERINEURAL PRN
Status: DISCONTINUED | OUTPATIENT
Start: 2021-08-20 | End: 2021-08-20 | Stop reason: SDUPTHER

## 2021-08-20 RX ORDER — SODIUM CHLORIDE 0.9 % (FLUSH) 0.9 %
10 SYRINGE (ML) INJECTION PRN
Status: DISCONTINUED | OUTPATIENT
Start: 2021-08-20 | End: 2021-08-20 | Stop reason: HOSPADM

## 2021-08-20 RX ORDER — SODIUM CHLORIDE 0.9 % (FLUSH) 0.9 %
10 SYRINGE (ML) INJECTION EVERY 12 HOURS SCHEDULED
Status: DISCONTINUED | OUTPATIENT
Start: 2021-08-20 | End: 2021-08-20 | Stop reason: HOSPADM

## 2021-08-20 RX ORDER — SODIUM CHLORIDE 9 MG/ML
25 INJECTION, SOLUTION INTRAVENOUS PRN
Status: DISCONTINUED | OUTPATIENT
Start: 2021-08-20 | End: 2021-08-20 | Stop reason: HOSPADM

## 2021-08-20 RX ORDER — SODIUM CHLORIDE 9 MG/ML
INJECTION, SOLUTION INTRAVENOUS CONTINUOUS
Status: DISCONTINUED | OUTPATIENT
Start: 2021-08-20 | End: 2021-08-20 | Stop reason: HOSPADM

## 2021-08-20 RX ADMIN — LIDOCAINE HYDROCHLORIDE 50 MG: 20 INJECTION, SOLUTION INFILTRATION; PERINEURAL at 08:29

## 2021-08-20 RX ADMIN — PROPOFOL 200 MCG/KG/MIN: 10 INJECTION, EMULSION INTRAVENOUS at 08:30

## 2021-08-20 RX ADMIN — SODIUM CHLORIDE: 9 INJECTION, SOLUTION INTRAVENOUS at 07:40

## 2021-08-20 ASSESSMENT — LIFESTYLE VARIABLES: SMOKING_STATUS: 0

## 2021-08-20 ASSESSMENT — PAIN SCALES - GENERAL
PAINLEVEL_OUTOF10: 0
PAINLEVEL_OUTOF10: 0

## 2021-08-20 ASSESSMENT — PAIN - FUNCTIONAL ASSESSMENT: PAIN_FUNCTIONAL_ASSESSMENT: 0-10

## 2021-08-20 ASSESSMENT — PAIN SCALES - WONG BAKER: WONGBAKER_NUMERICALRESPONSE: 0

## 2021-08-20 NOTE — ANESTHESIA PRE PROCEDURE
[facility] Department of Anesthesiology  Pre-Anesthesia Evaluation/Consultation       Name:  Aaron Morales  : 1960  Age:  57 y.o. MRN:  [id number]  Date: 2021           Surgeon: Surgeon(s):  Bobby Rodríguez MD    Procedure: Procedure(s):  COLORECTAL CANCER SCREENING, NOT HIGH RISK     Allergies   Allergen Reactions    Atorvastatin      Muscle aches     Codeine Itching    Gluten Meal     Simvastatin      Muscle aches       Patient Active Problem List   Diagnosis    Arthritis    Abnormal mammogram with microcalcification    Acid reflux    Chronic lymphocytic thyroiditis    Gluten intolerance    History of sleep disorder    Hypothyroidism    Irritable bowel syndrome with constipation    Osteoarthritis of CMC joint of thumb     Past Medical History:   Diagnosis Date    Anxiety     Cholestasis during pregnancy     cholestasis of pregnancy over 25 years ago     Chronic constipation     Chronic lymphocytic thyroiditis     GERD (gastroesophageal reflux disease)     Gluten intolerance     Hypothyroidism     Irritable bowel syndrome      Past Surgical History:   Procedure Laterality Date    BREAST BIOPSY Left     benign     SECTION      x1    COLONOSCOPY       Social History     Tobacco Use    Smoking status: Never Smoker    Smokeless tobacco: Never Used   Vaping Use    Vaping Use: Never used   Substance Use Topics    Alcohol use: Yes     Comment: rare    Drug use: Never     Medications  No current facility-administered medications on file prior to encounter.      Current Outpatient Medications on File Prior to Encounter   Medication Sig Dispense Refill    escitalopram (LEXAPRO) 5 MG tablet Take 1 tablet by mouth daily 90 tablet 1    valACYclovir (VALTREX) 500 MG tablet Take 1 tablet by mouth daily 90 tablet 1    Ascorbic Acid (VITAMIN C PO) Take 1 tablet by mouth daily      Probiotic Product (PROBIOTIC PO) Take 1 capsule by mouth daily      PREVACID 30 MG delayed release capsule Take 1 capsule by mouth daily 90 capsule 3    thyroid (ARMOUR THYROID) 90 MG tablet Take 90 mcg by mouth daily Take whole tablet daily alternating with 1/2 tablet daily      Hormone Cream Base (HRT CREAM BASE) CREA daily      polyethylene glycol (GLYCOLAX) packet Take 17 g by mouth daily as needed for Constipation      Cyanocobalamin (VITAMIN B-12) 2500 MCG SUBL Place 2,500 mcg under the tongue daily      Cholecalciferol (VITAMIN D3) 5000 units TABS Take 1 tablet by mouth daily       Omega-3 Fatty Acids (FISH OIL) 1000 MG CPDR Take 1,000 mg by mouth daily       NONFORMULARY ProGad Enhancer      NONFORMULARY L-5MTF-daily      NONFORMULARY Pro DHA 1000-daily      NONFORMULARY Take 1 tablet by mouth A-CYSTEINE/ARG ZN/GLUT/MV-MN (L GLUTAMINE-N ACET-ARG-VIT-MIN ORAL)-daily      ZINC PO Take 1 tablet by mouth daily       Current Facility-Administered Medications   Medication Dose Route Frequency Provider Last Rate Last Admin    0.9 % sodium chloride infusion   Intravenous Continuous Kasandra Solis  mL/hr at 21 0740 New Bag at 21 0740    sodium chloride flush 0.9 % injection 10 mL  10 mL Intravenous 2 times per day Kasandra Solis MD        sodium chloride flush 0.9 % injection 10 mL  10 mL Intravenous PRN Kasandra Solis MD        0.9 % sodium chloride infusion  25 mL Intravenous PRN Kasandra Solis MD         Vital Signs (Current)   Vitals:    21 1239 21 0734   BP:  (!) 151/77   Pulse:  93   Resp:  16   Temp:  97.8 °F (36.6 °C)   TempSrc:  Temporal   SpO2:  98%   Weight: 165 lb (74.8 kg) 158 lb (71.7 kg)   Height: 5' 1\" (1.549 m) 5' 1\" (1.549 m)                                            Vital Signs Statistics (for past 48 hrs)     Temp  Av.8 °F (36.6 °C)  Min: 97.8 °F (36.6 °C)   Min taken time: 2134  Max: 97.8 °F (36.6 °C)   Max taken time: 2134  Pulse  Av  Min: 93   Min taken time: 21 0734 Max: 80   Max taken time: 21  Resp  Av  Min: 12   Min taken time: 21  Max: 12   Max taken time: 21  BP  Min: 151/77   Min taken time: 21  Max: 151/77   Max taken time: 2134  SpO2  Av %  Min: 98 %   Min taken time: 21  Max: 98 %   Max taken time: 21  BP Readings from Last 3 Encounters:   21 (!) 151/77   21 134/80   21 126/76       BMI  Body mass index is 29.85 kg/m². Estimated body mass index is 29.85 kg/m² as calculated from the following:    Height as of this encounter: 5' 1\" (1.549 m). Weight as of this encounter: 158 lb (71.7 kg). CBC No results found for: WBC, RBC, HGB, HCT, MCV, RDW, PLT  CMP  No results found for: NA, K, CL, CO2, BUN, CREATININE, GFRAA, AGRATIO, LABGLOM, GLUCOSE, PROT, CALCIUM, BILITOT, ALKPHOS, AST, ALT  BMP  No results found for: NA, K, CL, CO2, BUN, CREATININE, CALCIUM, GFRAA, LABGLOM, GLUCOSE  POCGlucose  No results for input(s): GLUCOSE in the last 72 hours.    Coags  No results found for: PROTIME, INR, APTT  HCG (If Applicable) No results found for: PREGTESTUR, PREGSERUM, HCG, HCGQUANT   ABGs No results found for: PHART, PO2ART, QLJ5BBK, NMI9RTM, BEART, B7NIXABL   Type & Screen (If Applicable)  No results found for: LABABO, LABRH                         BMI: Wt Readings from Last 3 Encounters:       NPO Status:   Date of last liquid consumption: 21   Time of last liquid consumption: 0300   Date of last solid food consumption: 21      Time of last solid consumption: 0730       Anesthesia Evaluation  Patient summary reviewed no history of anesthetic complications:   Airway: Mallampati: II  TM distance: >3 FB   Neck ROM: full  Mouth opening: > = 3 FB Dental: normal exam         Pulmonary: breath sounds clear to auscultation      (-) COPD, asthma, sleep apnea and not a current smoker                           Cardiovascular:  Exercise tolerance: good (>4 METS),       (-) hypertension, past MI, CABG/stent and no hyperlipidemia        Rate: normal                    Neuro/Psych:      (-) seizures, TIA and CVA           GI/Hepatic/Renal:   (+) GERD:, liver disease:,           Endo/Other:    (+) hypothyroidism: arthritis: OA., .                 Abdominal:             Vascular:     - DVT and PE. Other Findings:             Anesthesia Plan      MAC     ASA 3       Induction: intravenous. Anesthetic plan and risks discussed with patient. Plan discussed with CRNA. This pre-anesthesia assessment may be used as a history and physical.    DOS STAFF ADDENDUM:    Pt seen and examined, chart reviewed (including anesthesia, drug and allergy history). No interval changes to history and physical examination. Anesthetic plan, risks, benefits, alternatives, and personnel involved discussed with patient. Patient verbalized an understanding and agrees to proceed.       Radha Joyce MD  August 20, 2021  7:42 AM

## 2021-08-20 NOTE — PROCEDURES
[facility]  Endoscopy Note    Patient: Asif Bridges  : 1960  Acct#: [de-identified]    Procedure: Colonoscopy     Date:  2021    Surgeon:  Erin Christie MD, MD    Referring Physician:  Cj Duarte    Previous Colonoscopy: Yes  Date: unknown  Greater than 3 years? Yes    Preoperative Diagnosis:  constipation    Postoperative Diagnosis:  Normal colon    Anesthesia:  See anesthesia note    Indications: This is a 57y.o. year old female who presents today with constipation. Procedure: An informed consent was obtained from the patient after explanation of indications, benefits, possible risks and complications of the procedure. The patient was then taken to the endoscopy suite, placed in the left lateral decubitus position, and the above IV anesthesia was administered. A digital rectal examination was performed and revealed negative without mass, lesions or tenderness. The Olympus CFQ-180-AL video colonoscope was placed in the patient's rectum under digital direction and advanced to the cecum. The cecum was identified by characteristic anatomy and ballottment. The ileocecal valve was identified. The preparation was excellent. The scope was then withdrawn back through the cecum, ascending, transverse, descending and sigmoid colons. Carefull circumferential examination of the mucosa in these areas demonstrated normal colonic mucosa throughout. The scope was then withdrawn into the rectum and retroflexed. The retroflexed view of the anal verge and rectum demonstrates no abnormalities. The scope was straightened, the colon was decompressed and the scope was withdrawn from the patient. The patient tolerated the procedure well and was taken to the PACU in good condition. Estimated Blood Loss:  none    Impression: Normal colon  Recommendations:  Repeat colonoscopy in 10 years.     Erin Christie MD, MD   [facility]  2021

## 2021-08-20 NOTE — ANESTHESIA POSTPROCEDURE EVALUATION
Department of Anesthesiology  Postprocedure Note    Patient: Rae Mcfadden  MRN: [id number]  YOB: 1960  Date of evaluation: 8/20/2021  Time:  9:17 AM     Procedure Summary     Date: 08/20/21 Room / Location: [address]    Anesthesia Start: 9139 Anesthesia Stop: 7234    Procedure: COLORECTAL CANCER SCREENING, NOT HIGH RISK (N/A ) Diagnosis:       Screen for colon cancer      (Screen for colon cancer)    Surgeons: Carmen Chadwick MD Responsible Provider: Saintclair Amos, MD    Anesthesia Type: MAC ASA Status: 3          Anesthesia Type: MAC    Becky Phase I: Becky Score: 10    Becky Phase II: Becky Score: 10    Last vitals: Reviewed and per EMR flowsheets.        Anesthesia Post Evaluation    Patient location during evaluation: PACU  Patient participation: complete - patient participated  Level of consciousness: awake and alert  Pain score: 0  Airway patency: patent  Nausea & Vomiting: no nausea and no vomiting  Complications: no  Cardiovascular status: blood pressure returned to baseline  Respiratory status: acceptable  Hydration status: euvolemic

## 2021-08-20 NOTE — H&P
Acids (FISH OIL) 1000 MG CPDR Take 1,000 mg by mouth daily    Yes Historical Provider, MD   NONFORMULARY ProGad Enhancer 5/1/18  Yes Historical Provider, MD   NONFORMULARY L-5MTF-daily 1/1/15  Yes Historical Provider, MD   NONFORMULARY Pro [address] [facility] 1000-daily 10/1/14  Yes Historical Provider, MD   NONFORMULARY Take 1 tablet by mouth A-CYSTEINE/ARG ZN/GLUT/MV-MN (L GLUTAMINE-N ACET-ARG-VIT-MIN ORAL)-daily   Yes Historical Provider, MD   ZINC PO Take 1 tablet by mouth daily    Historical Provider, MD     Allergies:   Atorvastatin, Codeine, Gluten meal, and Simvastatin    Social History     Socioeconomic History    Marital status:      Spouse name: Not on file    Number of children: Not on file    Years of education: Not on file    Highest education level: Not on file   Occupational History    Not on file   Tobacco Use    Smoking status: Never Smoker    Smokeless tobacco: Never Used   Vaping Use    Vaping Use: Never used   Substance and Sexual Activity    Alcohol use: Yes     Comment: rare    Drug use: Never    Sexual activity: Not Currently     Partners: Male   Other Topics Concern    Not on file   Social History Narrative    Not on file     Social Determinants of Health     Financial Resource Strain: Low Risk     Difficulty of Paying Living Expenses: Not hard at all   Food Insecurity: No Food Insecurity    Worried About Running Out of Food in the Last Year: Never true    Yvonne of Food in the Last Year: Never true   Transportation Needs: No Transportation Needs    Lack of Transportation (Medical): No    Lack of Transportation (Non-Medical):  No   Physical Activity:     Days of Exercise per Week:     Minutes of Exercise per Session:    Stress:     Feeling of Stress :    Social Connections:     Frequency of Communication with Friends and Family:     Frequency of Social Gatherings with Friends and Family:     Attends [employer] Services:     Active Member of Clubs or Organizations:     Attends Club or Organization Meetings:     Marital Status:    Intimate Partner Violence:     Fear of Current or Ex-Partner:     Emotionally Abused:     Physically Abused:     Sexually Abused:      Family History   Problem Relation Age of Onset    Heart Disease Mother         sudden death 61    High Blood Pressure Mother     Anxiety Disorder Mother     No Known Problems Father     Alcohol Abuse Brother     Substance Abuse Brother     No Known Problems Maternal Grandmother     No Known Problems Maternal Grandfather     No Known Problems Paternal Grandmother     No Known Problems Paternal Grandfather     No Known Problems Brother     No Known Problems Brother          PHYSICAL EXAM:      BP (!) 151/77   Pulse 93   Temp 97.8 °F (36.6 °C) (Temporal)   Resp 16   Ht 5' 1\" (1.549 m)   Wt 158 lb (71.7 kg)   LMP  (LMP Unknown)   SpO2 98%   BMI 29.85 kg/m²  I        Heart:normal    Lungs: normal    Abdomen: normal      ASA Grade:  See anesthesia note      ASSESSMENT AND PLAN:    1. Procedure options, risks and benefits reviewed with patient and expresses understanding.

## 2021-08-31 NOTE — PROGRESS NOTES
on the day of surgery. All jewelry must be removed. If you have dentures, they will be removed before going to operating room. For your convenience, we will provide you with a container. If you wear contact lenses or glasses, they will be removed, please bring a case for them. If you have a living will and a durable power of  for healthcare, please bring in a copy. As part of our patient safety program to minimize surgical site infections, we ask you to do the following:    · Please notify your surgeon if you develop any illness between         now and the  day of your surgery. · This includes a cough, cold, fever, sore throat, nausea,         or vomiting, and diarrhea, etc.  ·  Please notify your surgeon if you experience dizziness, shortness         of breath or blurred vision between now and the time of your surgery. Do not shave your operative site 96 hours prior to surgery. For face and neck surgery, men may use an electric razor 48 hours   prior to surgery. You may shower the night before surgery or the morning of   your surgery with an antibacterial soap. You will need to bring a photo ID and insurance card    [facility] has an onsite pharmacy, would you like to utilize our pharmacy     If you will be staying overnight and use a C-pap machine, please bring   your C-pap to hospital     Our goal is to provide you with excellent care, therefore, visitors will be limited to two(2) in the room at a time so that we may focus on providing this care for you. Please contact pre-admission testing if you have any further questions. [facility] phone number:  [phone]  Please note these are generalized instructions for all surgical cases, you may be provided with more specific instructions according to your surgery.

## 2021-09-01 ENCOUNTER — ANESTHESIA EVENT (OUTPATIENT)
Dept: ENDOSCOPY | Age: 61
End: 2021-09-01
Payer: COMMERCIAL

## 2021-09-03 ENCOUNTER — HOSPITAL ENCOUNTER (OUTPATIENT)
Age: 61
Setting detail: OUTPATIENT SURGERY
Discharge: HOME | End: 2021-09-03
Attending: INTERNAL MEDICINE | Admitting: INTERNAL MEDICINE
Payer: COMMERCIAL

## 2021-09-03 ENCOUNTER — ANESTHESIA (OUTPATIENT)
Dept: ENDOSCOPY | Age: 61
End: 2021-09-03
Payer: COMMERCIAL

## 2021-09-03 VITALS — DIASTOLIC BLOOD PRESSURE: 97 MMHG | SYSTOLIC BLOOD PRESSURE: 137 MMHG | OXYGEN SATURATION: 94 %

## 2021-09-03 VITALS
BODY MASS INDEX: 30.4 KG/M2 | SYSTOLIC BLOOD PRESSURE: 161 MMHG | HEART RATE: 64 BPM | TEMPERATURE: 96.6 F | HEIGHT: 61 IN | WEIGHT: 161 LBS | DIASTOLIC BLOOD PRESSURE: 92 MMHG | OXYGEN SATURATION: 100 % | RESPIRATION RATE: 16 BRPM

## 2021-09-03 DIAGNOSIS — R10.13 EPIGASTRIC PAIN: ICD-10-CM

## 2021-09-03 PROCEDURE — 3700000000 HC ANESTHESIA ATTENDED CARE: Performed by: INTERNAL MEDICINE

## 2021-09-03 PROCEDURE — 3700000001 HC ADD 15 MINUTES (ANESTHESIA): Performed by: INTERNAL MEDICINE

## 2021-09-03 PROCEDURE — 88305 TISSUE EXAM BY PATHOLOGIST: CPT

## 2021-09-03 PROCEDURE — 2709999900 HC NON-CHARGEABLE SUPPLY: Performed by: INTERNAL MEDICINE

## 2021-09-03 PROCEDURE — 3609012400 HC EGD TRANSORAL BIOPSY SINGLE/MULTIPLE: Performed by: INTERNAL MEDICINE

## 2021-09-03 PROCEDURE — 7100000010 HC PHASE II RECOVERY - FIRST 15 MIN: Performed by: INTERNAL MEDICINE

## 2021-09-03 PROCEDURE — 2500000003 HC RX 250 WO HCPCS: Performed by: NURSE ANESTHETIST, CERTIFIED REGISTERED

## 2021-09-03 PROCEDURE — 88342 IMHCHEM/IMCYTCHM 1ST ANTB: CPT

## 2021-09-03 PROCEDURE — 2580000003 HC RX 258: Performed by: STUDENT IN AN ORGANIZED HEALTH CARE EDUCATION/TRAINING PROGRAM

## 2021-09-03 PROCEDURE — 7100000011 HC PHASE II RECOVERY - ADDTL 15 MIN: Performed by: INTERNAL MEDICINE

## 2021-09-03 PROCEDURE — 6360000002 HC RX W HCPCS: Performed by: NURSE ANESTHETIST, CERTIFIED REGISTERED

## 2021-09-03 RX ORDER — ONDANSETRON 2 MG/ML
4 INJECTION INTRAMUSCULAR; INTRAVENOUS
Status: DISCONTINUED | OUTPATIENT
Start: 2021-09-03 | End: 2021-09-03 | Stop reason: HOSPADM

## 2021-09-03 RX ORDER — SODIUM CHLORIDE 0.9 % (FLUSH) 0.9 %
5-40 SYRINGE (ML) INJECTION PRN
Status: DISCONTINUED | OUTPATIENT
Start: 2021-09-03 | End: 2021-09-03 | Stop reason: HOSPADM

## 2021-09-03 RX ORDER — SODIUM CHLORIDE 0.9 % (FLUSH) 0.9 %
5-40 SYRINGE (ML) INJECTION EVERY 12 HOURS SCHEDULED
Status: DISCONTINUED | OUTPATIENT
Start: 2021-09-03 | End: 2021-09-03 | Stop reason: HOSPADM

## 2021-09-03 RX ORDER — PROPOFOL 10 MG/ML
INJECTION, EMULSION INTRAVENOUS PRN
Status: DISCONTINUED | OUTPATIENT
Start: 2021-09-03 | End: 2021-09-03 | Stop reason: SDUPTHER

## 2021-09-03 RX ORDER — SODIUM CHLORIDE 9 MG/ML
25 INJECTION, SOLUTION INTRAVENOUS PRN
Status: DISCONTINUED | OUTPATIENT
Start: 2021-09-03 | End: 2021-09-03 | Stop reason: HOSPADM

## 2021-09-03 RX ORDER — SODIUM CHLORIDE 9 MG/ML
INJECTION, SOLUTION INTRAVENOUS CONTINUOUS
Status: DISCONTINUED | OUTPATIENT
Start: 2021-09-03 | End: 2021-09-03 | Stop reason: HOSPADM

## 2021-09-03 RX ORDER — LIDOCAINE HYDROCHLORIDE 20 MG/ML
INJECTION, SOLUTION EPIDURAL; INFILTRATION; INTRACAUDAL; PERINEURAL PRN
Status: DISCONTINUED | OUTPATIENT
Start: 2021-09-03 | End: 2021-09-03 | Stop reason: SDUPTHER

## 2021-09-03 RX ADMIN — SODIUM CHLORIDE: 9 INJECTION, SOLUTION INTRAVENOUS at 08:01

## 2021-09-03 RX ADMIN — PROPOFOL 50 MG: 10 INJECTION, EMULSION INTRAVENOUS at 08:42

## 2021-09-03 RX ADMIN — LIDOCAINE HYDROCHLORIDE 100 MG: 20 INJECTION, SOLUTION EPIDURAL; INFILTRATION; INTRACAUDAL; PERINEURAL at 08:41

## 2021-09-03 RX ADMIN — PROPOFOL 30 MG: 10 INJECTION, EMULSION INTRAVENOUS at 08:44

## 2021-09-03 RX ADMIN — PROPOFOL 100 MG: 10 INJECTION, EMULSION INTRAVENOUS at 08:41

## 2021-09-03 ASSESSMENT — PAIN - FUNCTIONAL ASSESSMENT: PAIN_FUNCTIONAL_ASSESSMENT: 0-10

## 2021-09-03 ASSESSMENT — PAIN SCALES - GENERAL
PAINLEVEL_OUTOF10: 0

## 2021-09-03 ASSESSMENT — LIFESTYLE VARIABLES: SMOKING_STATUS: 0

## 2021-09-03 NOTE — H&P
[facility]   Pre-operative History and Physical    Patient: Charmaine Batista  : 1960  Acct#: [de-identified]    History Obtained From: electronic medical record    HISTORY OF PRESENT ILLNESS  Procedure:EGD  Indications:epigastric pain  Past Medical History:        Diagnosis Date    Anxiety     Cholestasis during pregnancy     cholestasis of pregnancy over 25 years ago     Chronic constipation     Chronic lymphocytic thyroiditis     COVID-19 2021    GERD (gastroesophageal reflux disease)     Gluten intolerance     Hypothyroidism     Irritable bowel syndrome      Past Surgical History:        Procedure Laterality Date    BREAST BIOPSY Left     benign     SECTION      x1    COLONOSCOPY      COLONOSCOPY N/A 2021    COLORECTAL CANCER SCREENING, NOT HIGH RISK performed by Lul Batres MD at [facility] ENDOSCOPY     Medications prior to admission:   Prior to Admission medications    Medication Sig Start Date End Date Taking?  Authorizing Provider   escitalopram (LEXAPRO) 5 MG tablet Take 1 tablet by mouth daily 21  Yes Milagro Xie MD   valACYclovir (VALTREX) 500 MG tablet Take 1 tablet by mouth daily 4/15/21  Yes Milagro Xie MD   ZINC PO Take 1 tablet by mouth daily   Yes Historical Provider, MD   Ascorbic Acid (VITAMIN C PO) Take 1 tablet by mouth daily   Yes Historical Provider, MD   Probiotic Product (PROBIOTIC PO) Take 1 capsule by mouth daily   Yes Historical Provider, MD   PREVACID 30 MG delayed release capsule Take 1 capsule by mouth daily 21  Yes Milagro Xie MD   thyroid (ARMOUR THYROID) 90 MG tablet Take 90 mcg by mouth daily Take whole tablet daily alternating with 1/2 tablet daily 10/1/14  Yes Historical Provider, MD   Hormone Cream Base (HRT CREAM BASE) CREA daily 10/1/15  Yes Historical Provider, MD   polyethylene glycol (GLYCOLAX) packet Take 17 g by mouth daily as needed for Constipation   Yes Historical Provider, MD   Cyanocobalamin (VITAMIN B-12) 2500 MCG SUBL Place 2,500 mcg under the tongue daily   Yes Historical Provider, MD   Cholecalciferol (VITAMIN D3) 5000 units TABS Take 1 tablet by mouth daily    Yes Historical Provider, MD   Omega-3 Fatty Acids (FISH OIL) 1000 MG CPDR Take 1,000 mg by mouth daily    Yes Historical Provider, MD   NONFORMULARY ProGad Enhancer 5/1/18  Yes Historical Provider, MD   NONFORMULARY L-5MTF-daily 1/1/15  Yes Historical Provider, MD   NONFORMULARY Pro [address] [facility] 1000-daily 10/1/14  Yes Historical Provider, MD   NONFORMULARY Take 1 tablet by mouth A-CYSTEINE/ARG ZN/GLUT/MV-MN (L GLUTAMINE-N ACET-ARG-VIT-MIN ORAL)-daily   Yes Historical Provider, MD     Allergies:   Atorvastatin, Codeine, Gluten meal, and Simvastatin    Social History     Socioeconomic History    Marital status:      Spouse name: Not on file    Number of children: Not on file    Years of education: Not on file    Highest education level: Not on file   Occupational History    Not on file   Tobacco Use    Smoking status: Never Smoker    Smokeless tobacco: Never Used   Vaping Use    Vaping Use: Never used   Substance and Sexual Activity    Alcohol use: Yes     Comment: rare    Drug use: Never    Sexual activity: Not Currently     Partners: Male   Other Topics Concern    Not on file   Social History Narrative    Not on file     Social Determinants of Health     Financial Resource Strain: Low Risk     Difficulty of Paying Living Expenses: Not hard at all   Food Insecurity: No Food Insecurity    Worried About Running Out of Food in the Last Year: Never true    Yvonne of Food in the Last Year: Never true   Transportation Needs: No Transportation Needs    Lack of Transportation (Medical): No    Lack of Transportation (Non-Medical):  No   Physical Activity:     Days of Exercise per Week:     Minutes of Exercise per Session:    Stress:     Feeling of Stress :    Social Connections:     Frequency of Communication with Friends and Family:     Frequency of Social Gatherings with Friends and Family:     Attends Anglican Services:     Active Member of Clubs or Organizations:     Attends Club or Organization Meetings:     Marital Status:    Intimate Partner Violence:     Fear of Current or Ex-Partner:     Emotionally Abused:     Physically Abused:     Sexually Abused:      Family History   Problem Relation Age of Onset    Heart Disease Mother         sudden death 61    High Blood Pressure Mother     Anxiety Disorder Mother     No Known Problems Father     Alcohol Abuse Brother     Substance Abuse Brother     No Known Problems Maternal Grandmother     No Known Problems Maternal Grandfather     No Known Problems Paternal Grandmother     No Known Problems Paternal Grandfather     No Known Problems Brother     No Known Problems Brother          PHYSICAL EXAM:      BP (!) 156/74   Pulse 76   Temp 98.1 °F (36.7 °C) (Oral)   Resp 16   Ht 5' 1\" (1.549 m)   Wt 161 lb (73 kg)   LMP  (LMP Unknown)   SpO2 100%   BMI 30.42 kg/m²  I        Heart:normal    Lungs: normal    Abdomen: normal      ASA Grade:  See anesthesia note      ASSESSMENT AND PLAN:    1. Procedure options, risks and benefits reviewed with patient and expresses understanding.

## 2021-09-03 NOTE — ANESTHESIA PRE PROCEDURE
[facility] Department of Anesthesiology  Pre-Anesthesia Evaluation/Consultation       Name:  Caleb Oreilly  : 1960  Age:  57 y.o. MRN:  [id number]  Date: 9/3/2021           Surgeon: Surgeon(s):  Larissa Lopez MD    Procedure: Procedure(s):  EGD ESOPHAGOGASTRODUODENOSCOPY     Allergies   Allergen Reactions    Atorvastatin      Muscle aches     Codeine Itching    Gluten Meal     Simvastatin      Muscle aches       Patient Active Problem List   Diagnosis    Arthritis    Abnormal mammogram with microcalcification    Acid reflux    Chronic lymphocytic thyroiditis    Gluten intolerance    History of sleep disorder    Hypothyroidism    Irritable bowel syndrome with constipation    Osteoarthritis of CMC joint of thumb     Past Medical History:   Diagnosis Date    Anxiety     Cholestasis during pregnancy     cholestasis of pregnancy over 25 years ago     Chronic constipation     Chronic lymphocytic thyroiditis     COVID-19 2021    GERD (gastroesophageal reflux disease)     Gluten intolerance     Hypothyroidism     Irritable bowel syndrome      Past Surgical History:   Procedure Laterality Date    BREAST BIOPSY Left     benign     SECTION      x1    COLONOSCOPY      COLONOSCOPY N/A 2021    COLORECTAL CANCER SCREENING, NOT HIGH RISK performed by Larissa Lopez MD at [address] History     Tobacco Use    Smoking status: Never Smoker    Smokeless tobacco: Never Used   Vaping Use    Vaping Use: Never used   Substance Use Topics    Alcohol use: Yes     Comment: rare    Drug use: Never     Medications  No current facility-administered medications on file prior to encounter.      Current Outpatient Medications on File Prior to Encounter   Medication Sig Dispense Refill    escitalopram (LEXAPRO) 5 MG tablet Take 1 tablet by mouth daily 90 tablet 1    valACYclovir (VALTREX) 500 MG tablet Take 1 tablet by mouth daily 90 tablet 1    ZINC PO Take 1 tablet by mouth daily      Ascorbic Acid (VITAMIN C PO) Take 1 tablet by mouth daily      Probiotic Product (PROBIOTIC PO) Take 1 capsule by mouth daily      PREVACID 30 MG delayed release capsule Take 1 capsule by mouth daily 90 capsule 3    thyroid (ARMOUR THYROID) 90 MG tablet Take 90 mcg by mouth daily Take whole tablet daily alternating with 1/2 tablet daily      Hormone Cream Base (HRT CREAM BASE) CREA daily      polyethylene glycol (GLYCOLAX) packet Take 17 g by mouth daily as needed for Constipation      Cyanocobalamin (VITAMIN B-12) 2500 MCG SUBL Place 2,500 mcg under the tongue daily      Cholecalciferol (VITAMIN D3) 5000 units TABS Take 1 tablet by mouth daily       Omega-3 Fatty Acids (FISH OIL) 1000 MG CPDR Take 1,000 mg by mouth daily       NONFORMULARY ProGad Enhancer      NONFORMULARY L-5MTF-daily      NONFORMULARY Pro DHA 1000-daily      NONFORMULARY Take 1 tablet by mouth A-CYSTEINE/ARG ZN/GLUT/MV-MN (L GLUTAMINE-N ACET-ARG-VIT-MIN ORAL)-daily       Current Facility-Administered Medications   Medication Dose Route Frequency Provider Last Rate Last Admin    0.9 % sodium chloride infusion   IntraVENous Continuous Fran Alvarez MD        sodium chloride flush 0.9 % injection 5-40 mL  5-40 mL IntraVENous 2 times per day Fran Alvarez MD        sodium chloride flush 0.9 % injection 5-40 mL  5-40 mL IntraVENous PRN Fran Alvarez MD        0.9 % sodium chloride infusion  25 mL IntraVENous PRN Fran Alvarez MD         Vital Signs (Current)   Vitals:    08/31/21 1307   Weight: 158 lb (71.7 kg)   Height: 5' 1\" (1.549 m)                                            Vital Signs Statistics (for past 48 hrs)     No data recorded  BP Readings from Last 3 Encounters:   08/20/21 (!) 163/84   08/20/21 137/74   06/08/21 134/80       BMI  Body mass index is 29.85 kg/m².   Estimated body mass index is 29.85 kg/m² as calculated from the following:    Height as of this encounter: 5' 1\" (1.549 m). Weight as of this encounter: 158 lb (71.7 kg). CBC No results found for: WBC, RBC, HGB, HCT, MCV, RDW, PLT  CMP  No results found for: NA, K, CL, CO2, BUN, CREATININE, GFRAA, AGRATIO, LABGLOM, GLUCOSE, PROT, CALCIUM, BILITOT, ALKPHOS, AST, ALT  BMP  No results found for: NA, K, CL, CO2, BUN, CREATININE, CALCIUM, GFRAA, LABGLOM, GLUCOSE  POCGlucose  No results for input(s): GLUCOSE in the last 72 hours. Coags  No results found for: PROTIME, INR, APTT  HCG (If Applicable) No results found for: PREGTESTUR, PREGSERUM, HCG, HCGQUANT   ABGs No results found for: PHART, PO2ART, APA5XBG, SLB3CPW, BEART, U4SZNKGH   Type & Screen (If Applicable)  No results found for: LABABO, LABRH                         BMI: Wt Readings from Last 3 Encounters:       NPO Status:     >8hrs                       Anesthesia Evaluation  Patient summary reviewed no history of anesthetic complications:   Airway: Mallampati: II  TM distance: >3 FB   Neck ROM: full  Mouth opening: > = 3 FB Dental:      Comment: No loose teeth    Pulmonary: breath sounds clear to auscultation      (-) COPD, asthma, sleep apnea and not a current smoker                           Cardiovascular:  Exercise tolerance: good (>4 METS),       (-) hypertension, past MI, CABG/stent and no hyperlipidemia      Rhythm: regular  Rate: normal                    Neuro/Psych:   (+) psychiatric history:depression/anxiety    (-) seizures, TIA and CVA           GI/Hepatic/Renal:   (+) GERD:, liver disease:,           Endo/Other:    (+) hypothyroidism: arthritis: OA., .                 Abdominal:             Vascular:     - DVT and PE. Other Findings:             Anesthesia Plan      MAC     ASA 3       Induction: intravenous. Anesthetic plan and risks discussed with patient. Plan discussed with CRNA.                   This pre-anesthesia assessment may be used as a history and physical.    DOS STAFF ADDENDUM:    Pt seen and examined, chart reviewed (including anesthesia, drug and allergy history). No interval changes to history and physical examination. Anesthetic plan, risks, benefits, alternatives, and personnel involved discussed with patient. Patient verbalized an understanding and agrees to proceed.       Des Jensen MD  September 3, 2021  7:53 AM

## 2021-09-03 NOTE — PROCEDURES
[facility]  Endoscopy Note    Patient: Pato Loyd  : 1960  Acct#: [de-identified]    Procedure: Esophagogastroduodenoscopy with biopsy    Date:  9/3/2021     Surgeon:  Ulyess Gosselin, MD, MD    Referring Physician:  Jessica Villagomez    Preoperative Diagnosis:  epigastric pain    Postoperative Diagnosis:  Gastric polyps and gastritis    Anesthesia: see anesthesia note. Indications: This is a 57y.o. year old female who presents today with New-onset dyspepsia. Description of Procedure:  Informed consent was obtained from the patient after explanation of indications, benefits and possible risks and complications of the procedure. The patient was then taken to the endoscopy suite, placed in the left lateral decubitus position and the above IV sedation was administrered. The Olympus videoendoscope was placed in the patient's mouth and under direct visualization passed into the esophagus. Visualization of the esophagus demonstrated normal..     The scope was then advanced into the stomach. Visualization of the gastric body and antrum demonstrated multiple gastric polyps. The antrum had inflammation and was biopsied. .  A retroflexed exam of the gastric cardia and fundus demonstrated gastric fundic polyps as well. .  The pylorus was patent and the scope was advanced into the duodenum. Visualization of the duodenal bulb demonstrated normal..  The second portion of the duodenum demonstrated normal..    The scope was then withdrawn back into the stomach, it was decompressed, and the scope was completely withdrawn. The patient tolerated the procedure well and was taken to the post anesthesia care unit in good condition. Estimated Blood loss:  none    Impression: Gastritis. Gastric polyps. Recommendations:Await pathology. EUS for stomach body polyps.     Ulyess Gosselin, MD, MD  [facility]

## 2021-09-03 NOTE — ANESTHESIA POSTPROCEDURE EVALUATION
Department of Anesthesiology  Postprocedure Note    Patient: Javier Fernandez  MRN: [id number]  YOB: 1960  Date of evaluation: 9/3/2021  Time:  9:46 AM     Procedure Summary     Date: 09/03/21 Room / Location: [address]    Anesthesia Start: 3589 Anesthesia Stop: 0116    Procedure: EGD BIOPSY (N/A ) Diagnosis:       Epigastric pain      (Epigastric pain)    Surgeons: Brent Guerra MD Responsible Provider: Sofya Hughes MD    Anesthesia Type: MAC ASA Status: 3          Anesthesia Type: MAC    Becky Phase I: Becky Score: 10    Becky Phase II: Becky Score: 10    Last vitals: Reviewed and per EMR flowsheets.        Anesthesia Post Evaluation    Patient location during evaluation: PACU  Patient participation: complete - patient participated  Level of consciousness: awake and alert  Airway patency: patent  Nausea & Vomiting: no nausea and no vomiting  Complications: no  Cardiovascular status: hemodynamically stable  Respiratory status: acceptable  Hydration status: stable

## 2021-11-23 NOTE — TELEPHONE ENCOUNTER
Pt is doing an VV w/ urgent care CVA (cerebrovascular accident) CVA (cerebrovascular accident) CVA (cerebrovascular accident) CVA (cerebrovascular accident) CVA (cerebrovascular accident)

## 2022-01-27 ENCOUNTER — VIRTUAL VISIT (OUTPATIENT)
Dept: FAMILY MEDICINE CLINIC | Age: 62
End: 2022-01-27
Payer: COMMERCIAL

## 2022-01-27 DIAGNOSIS — H65.92 OTHER NONSUPPURATIVE OTITIS MEDIA OF LEFT EAR, UNSPECIFIED CHRONICITY: Primary | ICD-10-CM

## 2022-01-27 DIAGNOSIS — J02.9 ACUTE VIRAL PHARYNGITIS: ICD-10-CM

## 2022-01-27 PROCEDURE — 99212 OFFICE O/P EST SF 10 MIN: CPT | Performed by: NURSE PRACTITIONER

## 2022-01-27 RX ORDER — AMOXICILLIN AND CLAVULANATE POTASSIUM 875; 125 MG/1; MG/1
1 TABLET, FILM COATED ORAL 2 TIMES DAILY
Qty: 20 TABLET | Refills: 0 | Status: SHIPPED | OUTPATIENT
Start: 2022-01-27 | End: 2022-02-06

## 2022-01-27 RX ORDER — FLUCONAZOLE 150 MG/1
150 TABLET ORAL ONCE
Qty: 1 TABLET | Refills: 0 | Status: SHIPPED | OUTPATIENT
Start: 2022-01-27 | End: 2022-01-27

## 2022-01-27 ASSESSMENT — ENCOUNTER SYMPTOMS
WHEEZING: 0
VOMITING: 0
NAUSEA: 0
SORE THROAT: 1
RHINORRHEA: 0
COUGH: 0
SINUS PAIN: 0
SHORTNESS OF BREATH: 0
TROUBLE SWALLOWING: 0
ABDOMINAL PAIN: 0

## 2022-01-27 NOTE — PATIENT INSTRUCTIONS
Patient Education        Sore Throat: Care Instructions  Your Care Instructions     Infection by bacteria or a virus causes most sore throats. Cigarette smoke, dry air, air pollution, allergies, and yelling can also cause a sore throat. Sore throats can be painful and annoying. Fortunately, most sore throats go away on their own. If you have a bacterial infection, your doctor may prescribe antibiotics. Follow-up care is a key part of your treatment and safety. Be sure to make and go to all appointments, and call your doctor if you are having problems. It's also a good idea to know your test results and keep a list of the medicines you take. How can you care for yourself at home? · If your doctor prescribed antibiotics, take them as directed. Do not stop taking them just because you feel better. You need to take the full course of antibiotics. · Gargle with warm salt water once an hour to help reduce swelling and relieve discomfort. Use 1 teaspoon of salt mixed in 1 cup of warm water. · Take an over-the-counter pain medicine, such as acetaminophen (Tylenol), ibuprofen (Advil, Motrin), or naproxen (Aleve). Read and follow all instructions on the label. · Be careful when taking over-the-counter cold or flu medicines and Tylenol at the same time. Many of these medicines have acetaminophen, which is Tylenol. Read the labels to make sure that you are not taking more than the recommended dose. Too much acetaminophen (Tylenol) can be harmful. · Drink plenty of fluids. Fluids may help soothe an irritated throat. Hot fluids, such as tea or soup, may help decrease throat pain. · Use over-the-counter throat lozenges to soothe pain. Regular cough drops or hard candy may also help. These should not be given to young children because of the risk of choking. · Do not smoke or allow others to smoke around you. If you need help quitting, talk to your doctor about stop-smoking programs and medicines.  These can increase your chances of quitting for good. · Use a vaporizer or humidifier to add moisture to your bedroom. Follow the directions for cleaning the machine. When should you call for help? Call your doctor now or seek immediate medical care if:    · You have new or worse trouble swallowing.     · Your sore throat gets much worse on one side. Watch closely for changes in your health, and be sure to contact your doctor if you do not get better as expected. Where can you learn more? Go to https://[employer].zkipster. org and sign in to your [facility] account. Enter C781 in the [employer] box to learn more about \"Sore Throat: Care Instructions. \"     If you do not have an account, please click on the \"Sign Up Now\" link. Current as of: September 8, 2021               Content Version: 13.1  © 9956-2546 [employer]. Care instructions adapted under license by [facility] ([facility]). If you have questions about a medical condition or this instruction, always ask your healthcare professional. [place] [address] any warranty or liability for your use of this information. Patient Education        Ear Infection (Otitis Media): Care Instructions  Overview     An ear infection may start with a cold and affect the middle ear (otitis media). It can hurt a lot. Most ear infections clear up on their own in a couple of days and do not need antibiotics. Also, antibiotics do not work against viruses, which may be the cause of your infection. Regular doses of pain relievers are the best way to reduce your fever and help you feel better. Follow-up care is a key part of your treatment and safety. Be sure to make and go to all appointments, and call your doctor if you are having problems. It's also a good idea to know your test results and keep a list of the medicines you take. How can you care for yourself at home? · Take pain medicines exactly as directed.   ? If the doctor gave you a prescription medicine for pain, take it as prescribed. ? If you are not taking a prescription pain medicine, take an over-the-counter medicine, such as acetaminophen (Tylenol), ibuprofen (Advil, Motrin), or naproxen (Aleve). Read and follow all instructions on the label. ? Do not take two or more pain medicines at the same time unless the doctor told you to. Many pain medicines have acetaminophen, which is Tylenol. Too much acetaminophen (Tylenol) can be harmful. · Plan to take a full dose of pain reliever before bedtime. Getting enough sleep will help you get better. · Try a warm, moist washcloth on the ear. It may help relieve pain. · If your doctor prescribed antibiotics, take them as directed. Do not stop taking them just because you feel better. You need to take the full course of antibiotics. When should you call for help? Call your doctor now or seek immediate medical care if:    · You have new or increasing ear pain.     · You have new or increasing pus or blood draining from your ear.     · You have a fever with a stiff neck or a severe headache. Watch closely for changes in your health, and be sure to contact your doctor if:    · You have new or worse symptoms.     · You are not getting better after taking an antibiotic for 2 days. Where can you learn more? Go to https://[employer].[facility]. org and sign in to your [employer] account. Enter K847 in the [facility] box to learn more about \"Ear Infection (Otitis Media): Care Instructions. \"     If you do not have an account, please click on the \"Sign Up Now\" link. Current as of: September 8, 2021               Content Version: 13.1  © 1913-5948 Healthwise[employer] Incorporated. Care instructions adapted under license by [facility] ([facility]).  If you have questions about a medical condition or this instruction, always ask your healthcare professional. Ayad Potts any warranty or liability for your use of this information.

## 2022-01-31 RX ORDER — LANSOPRAZOLE 30 MG
30 CAPSULE,DELAYED RELEASE (ENTERIC COATED) ORAL DAILY
Qty: 90 CAPSULE | Refills: 1 | Status: SHIPPED | OUTPATIENT
Start: 2022-01-31 | End: 2022-02-17 | Stop reason: SDUPTHER

## 2022-02-17 ENCOUNTER — OFFICE VISIT (OUTPATIENT)
Dept: FAMILY MEDICINE CLINIC | Age: 62
End: 2022-02-17
Payer: COMMERCIAL

## 2022-02-17 VITALS
WEIGHT: 165 LBS | HEART RATE: 76 BPM | OXYGEN SATURATION: 98 % | BODY MASS INDEX: 31.15 KG/M2 | TEMPERATURE: 98.3 F | SYSTOLIC BLOOD PRESSURE: 124 MMHG | DIASTOLIC BLOOD PRESSURE: 70 MMHG | HEIGHT: 61 IN

## 2022-02-17 DIAGNOSIS — Z00.00 ANNUAL PHYSICAL EXAM: Primary | ICD-10-CM

## 2022-02-17 DIAGNOSIS — L82.1 KERATOSIS, SEBORRHEIC: ICD-10-CM

## 2022-02-17 DIAGNOSIS — K58.1 IRRITABLE BOWEL SYNDROME WITH CONSTIPATION: ICD-10-CM

## 2022-02-17 DIAGNOSIS — K21.9 GASTROESOPHAGEAL REFLUX DISEASE WITHOUT ESOPHAGITIS: ICD-10-CM

## 2022-02-17 DIAGNOSIS — B00.9 RECURRENT HERPES SIMPLEX: ICD-10-CM

## 2022-02-17 PROCEDURE — 99214 OFFICE O/P EST MOD 30 MIN: CPT | Performed by: NURSE PRACTITIONER

## 2022-02-17 RX ORDER — LANSOPRAZOLE 30 MG
30 CAPSULE,DELAYED RELEASE (ENTERIC COATED) ORAL DAILY
Qty: 90 CAPSULE | Refills: 3 | Status: SHIPPED | OUTPATIENT
Start: 2022-02-17

## 2022-02-17 RX ORDER — VALACYCLOVIR HYDROCHLORIDE 500 MG/1
500 TABLET, FILM COATED ORAL DAILY
Qty: 90 TABLET | Refills: 1 | Status: SHIPPED | OUTPATIENT
Start: 2022-02-17

## 2022-02-17 SDOH — ECONOMIC STABILITY: FOOD INSECURITY: WITHIN THE PAST 12 MONTHS, THE FOOD YOU BOUGHT JUST DIDN'T LAST AND YOU DIDN'T HAVE MONEY TO GET MORE.: PATIENT DECLINED

## 2022-02-17 SDOH — ECONOMIC STABILITY: FOOD INSECURITY: WITHIN THE PAST 12 MONTHS, YOU WORRIED THAT YOUR FOOD WOULD RUN OUT BEFORE YOU GOT MONEY TO BUY MORE.: PATIENT DECLINED

## 2022-02-17 ASSESSMENT — ENCOUNTER SYMPTOMS
COUGH: 0
NAUSEA: 0
CONSTIPATION: 1
ABDOMINAL PAIN: 0
WHEEZING: 0
DIARRHEA: 0
SHORTNESS OF BREATH: 0

## 2022-02-17 ASSESSMENT — SOCIAL DETERMINANTS OF HEALTH (SDOH): HOW HARD IS IT FOR YOU TO PAY FOR THE VERY BASICS LIKE FOOD, HOUSING, MEDICAL CARE, AND HEATING?: PATIENT DECLINED

## 2022-02-17 NOTE — PATIENT INSTRUCTIONS
Patient Education        Seborrheic Keratosis: Care Instructions  Your Care Instructions  Seborrheic keratoses are raised skin growths that look scaly or warty. They usually look like they were stuck onto the skin. They most often grow in groups on the back or chest and are more common in older people. A seborrheic keratosis can be tan or dark brown. A seborrheic keratosis is not a mole and is almost always harmless. But it is still a good idea to check your skin regularly. Sometimes a seborrheic keratosis can itch. Scratching it can cause it to bleed and sometimes even scar. A seborrheic keratosis is removed only if it bothers you. The doctor will freeze it or scrape it off with a tool. The doctor can also use a laser to remove a seborrheic keratosis. Treatment usually results in normal-looking skin, but it can leave a light or dark ruma or even a scar on the skin. Follow-up care is a key part of your treatment and safety. Be sure to make and go to all appointments, and call your doctor if you are having problems. It's also a good idea to know your test results and keep a list of the medicines you take. How can you care for yourself at home? · If clothing irritates your seborrheic keratosis, cover it with a bandage to prevent rubbing and bleeding. · If you have a seborrheic keratosis removed, clean the area with soap and water two times a day unless your doctor gives you different instructions. Don't use hydrogen peroxide or alcohol, which can slow healing. ? You may cover the wound with a thin layer of petroleum jelly, such as Vaseline, and a nonstick bandage. · If you see a change in a skin growth, contact your doctor. Look for:  ? A mole that bleeds. ? A fast-growing mole. ? A scaly or crusted growth on the skin. ? A sore that will not heal.  When should you call for help?    Call your doctor now or seek immediate medical care if:    · You have an area of normal skin that suddenly changes in shape, size, or how it looks.     · Your skin is badly broken from scratching.     · You have signs of infection such as:  ? Pain, warmth, or swelling in your skin. ? Red streaks near a wound in your skin. ? Pus coming from a wound in your skin. ? A fever not due to the flu or other illness. Watch closely for changes in your health, and be sure to contact your doctor if:    · You do not get better as expected. Where can you learn more? Go to https://C[employer] Podium[employer]Communities for Causeeb.[employer]. org and sign in to your [email or web address] account. Enter H208 in the [facility] box to learn more about \"Seborrheic Keratosis: Care Instructions. \"     If you do not have an account, please click on the \"Sign Up Now\" link. Current as of: March 3, 2021               Content Version: 13.1  © 2006-2021 [employer]. Care instructions adapted under license by [facility] ([facility]). If you have questions about a medical condition or this instruction, always ask your healthcare professional. [place] [address] any warranty or liability for your use of this information. Patient Education        Well Visit, Women 48 to 72: Care Instructions  Overview     Well visits can help you stay healthy. Your doctor has checked your overall health and may have suggested ways to take good care of yourself. Your doctor also may have recommended tests. At home, you can help prevent illness with healthy eating, regular exercise, and other steps. Follow-up care is a key part of your treatment and safety. Be sure to make and go to all appointments, and call your doctor if you are having problems. It's also a good idea to know your test results and keep a list of the medicines you take. How can you care for yourself at home? · Get screening tests that you and your doctor decide on. Screening helps find diseases before any symptoms appear. · Eat healthy foods.  Choose fruits, vegetables, whole grains, protein, and low-fat dairy foods. Limit fat, especially saturated fat. Reduce salt in your diet. · Limit alcohol. Have no more than 1 drink a day or 7 drinks a week. · Get at least 30 minutes of exercise on most days of the week. Walking is a good choice. You also may want to do other activities, such as running, swimming, cycling, or playing tennis or team sports. · Reach and stay at a healthy weight. This will lower your risk for many problems, such as obesity, diabetes, heart disease, and high blood pressure. · Do not smoke. Smoking can make health problems worse. If you need help quitting, talk to your doctor about stop-smoking programs and medicines. These can increase your chances of quitting for good. · Care for your mental health. It is easy to get weighed down by worry and stress. Learn strategies to manage stress, like deep breathing and mindfulness, and stay connected with your family and community. If you find you often feel sad or hopeless, talk with your doctor. Treatment can help. · Talk to your doctor about whether you have any risk factors for sexually transmitted infections (STIs). You can help prevent STIs if you wait to have sex with a new partner (or partners) until you've each been tested for STIs. It also helps if you use condoms (male or female condoms) and if you limit your sex partners to one person who only has sex with you. Vaccines are available for some STIs. · If you think you may have a problem with alcohol or drug use, talk to your doctor. This includes prescription medicines (such as amphetamines and opioids) and illegal drugs (such as cocaine and methamphetamine). Your doctor can help you figure out what type of treatment is best for you. · Protect your skin from too much sun. When you're outdoors from 10 a.m. to 4 p.m., stay in the shade or cover up with clothing and a hat with a wide brim. Wear sunglasses that block UV rays.  Even when it's cloudy, put broad-spectrum sunscreen (SPF 30 or higher) on any exposed skin. · See a dentist one or two times a year for checkups and to have your teeth cleaned. · Wear a seat belt in the car. When should you call for help? Watch closely for changes in your health, and be sure to contact your doctor if you have any problems or symptoms that concern you. Where can you learn more? Go to https://VideoSurfpepiceweb.healthSavision. org and sign in to your [employer] account. Enter C192 in the [employer] box to learn more about \"Well Visit, Women 50 to 72: Care Instructions. \"     If you do not have an account, please click on the \"Sign Up Now\" link. Current as of: October 6, 2021               Content Version: 13.1  © 2006-2021 Healthwise, Incorporated. Care instructions adapted under license by [facility] ([facility]). If you have questions about a medical condition or this instruction, always ask your healthcare professional. [place] [address] any warranty or liability for your use of this information.

## 2022-02-17 NOTE — PROGRESS NOTES
HISTORY AND PHYSICAL             Date: 2022        Patient Name: Lakisha Vargas     YOB: 1960      Age:  57 y.o. Chief Complaint     Chief Complaint   Patient presents with    Medication Refill        History Obtained From   patient    History of Present Illness   Presents today for annual check up and medication refill. Has skin lesion on her left side she would like looked at. Overall feeling well, weaned off lexapro, taking supplements. States she is seeing a private medicine Doc who managaes her thyroid and vitamin levels. Up to date on EGD and colonoscopy, Follows with Dr. Alexandr Koch. Denies special diets, cutting out gluten and sugars, reports it helps with her \"gut issues\". Denies exercise. Works full time from home, . Sleeping better with supplements.  recovering from [place] on oxygen  Past Medical History     Past Medical History:   Diagnosis Date    Anxiety     Cholestasis during pregnancy     cholestasis of pregnancy over 25 years ago     Chronic constipation     Chronic lymphocytic thyroiditis     COVID-19 2021    GERD (gastroesophageal reflux disease)     Gluten intolerance     Hypothyroidism     Irritable bowel syndrome         Past Surgical History     Past Surgical History:   Procedure Laterality Date    BREAST BIOPSY Left     benign     SECTION      x1    COLONOSCOPY      COLONOSCOPY N/A 2021    COLORECTAL CANCER SCREENING, NOT HIGH RISK performed by Stacia Hernandez MD at [address] [place] [address] N/A 9/3/2021    EGD BIOPSY performed by Stacia Hernandez MD at [facility] ENDOSCOPY        Medications Prior to Admission     Prior to Admission medications    Medication Sig Start Date End Date Taking?  Authorizing Provider   valACYclovir (VALTREX) 500 MG tablet Take 1 tablet by mouth daily 22  Yes Corkye Pair, APRN - NP   PREVACID 30 MG delayed release capsule Take 1 capsule by mouth daily 2/17/22  Yes Sherlene Essex, APRN - NP   ZINC PO Take 1 tablet by mouth daily   Yes Historical Provider, MD   Ascorbic Acid (VITAMIN C PO) Take 1 tablet by mouth daily   Yes Historical Provider, MD   Probiotic Product (PROBIOTIC PO) Take 1 capsule by mouth daily   Yes Historical Provider, MD   thyroid (ARMOUR THYROID) 90 MG tablet Take 90 mcg by mouth daily Take whole tablet daily alternating with 1/2 tablet daily 10/1/14  Yes Historical Provider, MD   Hormone Cream Base (HRT CREAM BASE) CREA daily 10/1/15  Yes Historical Provider, MD   polyethylene glycol (GLYCOLAX) packet Take 17 g by mouth daily as needed for Constipation   Yes Historical Provider, MD   Cyanocobalamin (VITAMIN B-12) 2500 MCG SUBL Place 2,500 mcg under the tongue daily   Yes Historical Provider, MD   Cholecalciferol (VITAMIN D3) 5000 units TABS Take 1 tablet by mouth daily    Yes Historical Provider, MD   Omega-3 Fatty Acids (FISH OIL) 1000 MG CPDR Take 1,000 mg by mouth daily    Yes Historical Provider, MD   NONFORMULARY ProGad Enhancer 5/1/18  Yes Historical Provider, MD   NONFORMULARY L-5MTF-daily 1/1/15  Yes Historical Provider, MD   NONFORMULARY Pro [facility] 1000-daily 10/1/14  Yes Historical Provider, MD   NONFORMULARY Take 1 tablet by mouth A-CYSTEINE/ARG ZN/GLUT/MV-MN (L GLUTAMINE-N ACET-ARG-VIT-MIN ORAL)-daily   Yes Historical Provider, MD        Allergies   Latex, Atorvastatin, Codeine, Gluten meal, and Simvastatin    Social History     Social History     Tobacco History     Smoking Status  Never Smoker    Smokeless Tobacco Use  Never Used          Alcohol History     Alcohol Use Status  Yes Comment  rare          Drug Use     Drug Use Status  Never          Sexual Activity     Sexually Active  Not Currently Partners  Male                Family History     Family History   Problem Relation Age of Onset    Heart Disease Mother         sudden death 61    High Blood Pressure Mother     Anxiety Disorder Mother     No Known Problems Father  Alcohol Abuse Brother     Substance Abuse Brother     No Known Problems Maternal Grandmother     No Known Problems Maternal Grandfather     No Known Problems Paternal Grandmother     No Known Problems Paternal Grandfather     No Known Problems Brother     No Known Problems Brother        Review of Systems   Review of Systems   Constitutional: Negative for activity change, fatigue, fever and unexpected weight change. Eyes: Negative for visual disturbance. Respiratory: Negative for cough, shortness of breath and wheezing. Cardiovascular: Negative for chest pain, palpitations and leg swelling. Gastrointestinal: Positive for constipation (chronic, miralax daily ). Negative for abdominal pain, diarrhea and nausea. Endocrine: Positive for cold intolerance and heat intolerance. Negative for polydipsia, polyphagia and polyuria. Genitourinary: Negative for difficulty urinating. Skin:        Mole on left side   Allergic/Immunologic: Positive for environmental allergies. Neurological: Negative for dizziness, weakness and headaches. Psychiatric/Behavioral: Negative for decreased concentration, dysphoric mood and sleep disturbance. The patient is not nervous/anxious. Physical Exam   /70 (Site: Left Upper Arm, Position: Sitting, Cuff Size: Medium Adult)   Pulse 76   Temp 98.3 °F (36.8 °C) (Oral)   Ht 5' 1\" (1.549 m)   Wt 165 lb (74.8 kg)   LMP  (LMP Unknown)   SpO2 98%   BMI 31.18 kg/m²     Physical Exam  Constitutional:       General: She is not in acute distress. Appearance: She is obese. She is not ill-appearing. HENT:      Head: Normocephalic and atraumatic. Right Ear: Tympanic membrane, ear canal and external ear normal.      Left Ear: Tympanic membrane, ear canal and external ear normal.      Nose: Nose normal.      Mouth/Throat:      Mouth: Mucous membranes are moist.      Pharynx: Oropharynx is clear.    Eyes:      Extraocular Movements: Extraocular movements intact. Conjunctiva/sclera: Conjunctivae normal.      Pupils: Pupils are equal, round, and reactive to light. Neck:      Thyroid: No thyromegaly. Vascular: No carotid bruit. Cardiovascular:      Rate and Rhythm: Normal rate and regular rhythm. Pulses: Normal pulses. Heart sounds: Normal heart sounds. Pulmonary:      Effort: Pulmonary effort is normal.      Breath sounds: Normal breath sounds. Abdominal:      General: Bowel sounds are normal. There is no distension. Palpations: Abdomen is soft. There is no mass. Tenderness: There is no abdominal tenderness. There is no right CVA tenderness or left CVA tenderness. Musculoskeletal:      Cervical back: No tenderness. Lymphadenopathy:      Cervical: No cervical adenopathy. Skin:     General: Skin is warm and dry. Capillary Refill: Capillary refill takes less than 2 seconds. Findings: Lesion: small, slightly raised sebhorreic keratosis lesion to left  side. Neurological:      Mental Status: She is alert and oriented to person, place, and time. Psychiatric:         Mood and Affect: Mood normal.         Labs      Orders Only on 10/15/2019   Component Date Value Ref Range Status    Hemoglobin A1C 01/10/2019 5.2  % Final          Assessment & Plan   1. Annual physical exam  Up to date on screenings and immunizations, wants to wait until after 71 for shingles vacc. - CBC with Auto Differential; Future  - Comprehensive Metabolic Panel; Future  - Lipid, Fasting; Future  - Vitamin D 25 Hydroxy; Future  - TSH; Future  - Hemoglobin A1C; Future    2. Recurrent herpes simplex  No current out break, requested refill today  - valACYclovir (VALTREX) 500 MG tablet; Take 1 tablet by mouth daily  Dispense: 90 tablet; Refill: 1    3. Gastroesophageal reflux disease without esophagitis  Well controlled Prevacid    4.  Keratosis, seborrheic  New, discussed lesion, follow pt education and instructions included in AVS, declined referral for Derm, states she has one.     5. Irritable bowel syndrome with constipation  Controlled with diet and supplements.           Electronically signed by KIRAN Dixon NP on 2/17/22 at 4:15 PM EST

## 2022-03-28 ENCOUNTER — OFFICE VISIT (OUTPATIENT)
Dept: FAMILY MEDICINE CLINIC | Age: 62
End: 2022-03-28
Payer: COMMERCIAL

## 2022-03-28 VITALS
HEIGHT: 61 IN | TEMPERATURE: 98.3 F | SYSTOLIC BLOOD PRESSURE: 126 MMHG | WEIGHT: 165 LBS | HEART RATE: 83 BPM | BODY MASS INDEX: 31.15 KG/M2 | OXYGEN SATURATION: 98 % | DIASTOLIC BLOOD PRESSURE: 71 MMHG

## 2022-03-28 DIAGNOSIS — H66.006 RECURRENT ACUTE SUPPURATIVE OTITIS MEDIA WITHOUT SPONTANEOUS RUPTURE OF TYMPANIC MEMBRANE OF BOTH SIDES: ICD-10-CM

## 2022-03-28 DIAGNOSIS — J01.00 ACUTE MAXILLARY SINUSITIS, RECURRENCE NOT SPECIFIED: Primary | ICD-10-CM

## 2022-03-28 PROCEDURE — 99213 OFFICE O/P EST LOW 20 MIN: CPT | Performed by: INTERNAL MEDICINE

## 2022-03-28 RX ORDER — AMOXICILLIN AND CLAVULANATE POTASSIUM 875; 125 MG/1; MG/1
1 TABLET, FILM COATED ORAL 2 TIMES DAILY
Qty: 20 TABLET | Refills: 0 | Status: SHIPPED | OUTPATIENT
Start: 2022-03-28 | End: 2022-04-07

## 2022-03-28 ASSESSMENT — PATIENT HEALTH QUESTIONNAIRE - PHQ9
1. LITTLE INTEREST OR PLEASURE IN DOING THINGS: 0
2. FEELING DOWN, DEPRESSED OR HOPELESS: 0
SUM OF ALL RESPONSES TO PHQ QUESTIONS 1-9: 0
SUM OF ALL RESPONSES TO PHQ9 QUESTIONS 1 & 2: 0
SUM OF ALL RESPONSES TO PHQ QUESTIONS 1-9: 0

## 2022-03-28 NOTE — PROGRESS NOTES
SUBJECTIVE:  Mona Peters is a 60 y.o. female being evaluated for:    Chief Complaint   Patient presents with    Cough      Patient is having cough , sore thort, tired .      Pharyngitis    Sinus Problem    Nasal Congestion    Fatigue       HPI   Gums and teeth aching for a while   Saw dentist teeth fine and felt it was allergies  Monday night worst sore throat ever  Next am could not talk   Dry throat up all night coughing  Saturday felt dizzy  Blowing nose green and bloody  Post nasal drip  NO facial pain  Irritated tickle   Sneezing Itchy watery eyes      Allergies   Allergen Reactions    Latex      BANDAIDS    Atorvastatin      Muscle aches     Codeine Itching    Gluten Meal     Simvastatin      Muscle aches       Current Outpatient Medications   Medication Sig Dispense Refill    amoxicillin-clavulanate (AUGMENTIN) 875-125 MG per tablet Take 1 tablet by mouth 2 times daily for 10 days 20 tablet 0    valACYclovir (VALTREX) 500 MG tablet Take 1 tablet by mouth daily 90 tablet 1    PREVACID 30 MG delayed release capsule Take 1 capsule by mouth daily 90 capsule 3    Ascorbic Acid (VITAMIN C PO) Take 1 tablet by mouth daily      Probiotic Product (PROBIOTIC PO) Take 1 capsule by mouth daily      thyroid (ARMOUR THYROID) 90 MG tablet Take 90 mcg by mouth daily Take whole tablet daily alternating with 1/2 tablet daily      Hormone Cream Base (HRT CREAM BASE) CREA daily      polyethylene glycol (GLYCOLAX) packet Take 17 g by mouth daily as needed for Constipation      Cyanocobalamin (VITAMIN B-12) 2500 MCG SUBL Place 2,500 mcg under the tongue daily      Cholecalciferol (VITAMIN D3) 5000 units TABS Take 1 tablet by mouth daily       Omega-3 Fatty Acids (FISH OIL) 1000 MG CPDR Take 1,000 mg by mouth daily       NONFORMULARY ProGad Enhancer      NONFORMULARY L-5MTF-daily      NONFORMULARY Pro DHA 1000-daily      NONFORMULARY Take 1 tablet by mouth A-CYSTEINE/ARG ZN/GLUT/MV-MN (L GLUTAMINE-N ACET-ARG-VIT-MIN ORAL)-daily       No current facility-administered medications for this visit. Social History     Socioeconomic History    Marital status:      Spouse name: Not on file    Number of children: Not on file    Years of education: Not on file    Highest education level: Not on file   Occupational History    Not on file   Tobacco Use    Smoking status: Never Smoker    Smokeless tobacco: Never Used   Vaping Use    Vaping Use: Never used   Substance and Sexual Activity    Alcohol use: Yes     Comment: rare    Drug use: Never    Sexual activity: Not Currently     Partners: Male   Other Topics Concern    Not on file   Social History Narrative    Not on file     Social Determinants of Health     Financial Resource Strain: Unknown    Difficulty of Paying Living Expenses: Patient refused   Food Insecurity: Unknown    Worried About Running Out of Food in the Last Year: Patient refused    [address] in the Last Year: Patient refused   Transportation Needs:     Lack of Transportation (Medical): Not on file    Lack of Transportation (Non-Medical):  Not on file   Physical Activity:     Days of Exercise per Week: Not on file    Minutes of Exercise per Session: Not on file   Stress:     Feeling of Stress : Not on file   Social Connections:     Frequency of Communication with Friends and Family: Not on file    Frequency of Social Gatherings with Friends and Family: Not on file    Attends Congregation Services: Not on file    Active Member of Clubs or Organizations: Not on file    Attends Club or Organization Meetings: Not on file    Marital Status: Not on file   Intimate Partner Violence:     Fear of Current or Ex-Partner: Not on file    Emotionally Abused: Not on file    Physically Abused: Not on file    Sexually Abused: Not on file   Housing Stability:     Unable to Pay for Housing in the Last Year: Not on file    Number of [place] in the Last Year: Not on file  Unstable Housing in the Last Year: Not on file      Past Medical History:   Diagnosis Date    Anxiety     Cholestasis during pregnancy     cholestasis of pregnancy over 25 years ago     Chronic constipation     Chronic lymphocytic thyroiditis     COVID-19 2021    GERD (gastroesophageal reflux disease)     Gluten intolerance     Hypothyroidism     Irritable bowel syndrome      Past Surgical History:   Procedure Laterality Date    BREAST BIOPSY Left     benign     SECTION      x1    COLONOSCOPY      COLONOSCOPY N/A 2021    COLORECTAL CANCER SCREENING, NOT HIGH RISK performed by Carmen Chadwick MD at [address] [place] [address] N/A 9/3/2021    EGD BIOPSY performed by Carmen Chadwick MD at [place] [address]       Review of Systems   Constitutional: Negative for chills and fever. HENT: Positive for congestion, mouth sores, postnasal drip, rhinorrhea, sinus pressure, sore throat and voice change. Negative for sinus pain. Eyes: Positive for discharge and itching. Negative for pain. Respiratory: Positive for cough. Negative for shortness of breath and wheezing. Cardiovascular: Negative for chest pain and palpitations. Gastrointestinal: Negative for abdominal pain, diarrhea, nausea and vomiting. Musculoskeletal: Negative for myalgias. Neurological: Positive for dizziness and light-headedness. Negative for headaches. OBJECTIVE:  /71 (Site: Left Upper Arm, Position: Sitting, Cuff Size: Medium Adult)   Pulse 83   Temp 98.3 °F (36.8 °C) (Oral)   Ht 5' 1\" (1.549 m)   Wt 165 lb (74.8 kg)   LMP  (LMP Unknown)   SpO2 98%   BMI 31.18 kg/m²      Body mass index is 31.18 kg/m². Physical Exam  Constitutional:       General: She is not in acute distress. Appearance: Normal appearance. HENT:      Head: Normocephalic and atraumatic.       Comments: Max tenderness right side     Right Ear: Ear canal normal.      Left Ear: Ear canal normal. Ears:      Comments: Both tm with fluid and red      Nose: Congestion present. Mouth/Throat:      Mouth: Mucous membranes are dry. Pharynx: No oropharyngeal exudate or posterior oropharyngeal erythema. Eyes:      Conjunctiva/sclera: Conjunctivae normal.   Cardiovascular:      Rate and Rhythm: Normal rate and regular rhythm. Heart sounds: Normal heart sounds. No murmur heard. No gallop. Pulmonary:      Effort: Pulmonary effort is normal.      Breath sounds: Normal breath sounds. No wheezing or rhonchi. Chest:      Chest wall: No tenderness. Abdominal:      General: There is no distension. Palpations: Abdomen is soft. Tenderness: There is no abdominal tenderness. Musculoskeletal:         General: No swelling. Cervical back: Neck supple. No rigidity or tenderness. Lymphadenopathy:      Cervical: No cervical adenopathy. Skin:     General: Skin is warm and dry. Findings: No rash. Neurological:      General: No focal deficit present. Mental Status: She is alert. Gait: Gait normal.         ASSESSMENT/PLAN:    Kim Dangelo was seen today for cough, pharyngitis, sinus problem, nasal congestion and fatigue. Diagnoses and all orders for this visit:    Acute maxillary sinusitis, recurrence not specified    Recurrent acute suppurative otitis media without spontaneous rupture of tympanic membrane of both sides    Other orders  -     amoxicillin-clavulanate (AUGMENTIN) 875-125 MG per tablet; Take 1 tablet by mouth 2 times daily for 10 days        Orders Placed This Encounter   Medications    amoxicillin-clavulanate (AUGMENTIN) 875-125 MG per tablet     Sig: Take 1 tablet by mouth 2 times daily for 10 days     Dispense:  20 tablet     Refill:  0        Return if symptoms worsen or fail to improve. There are no Patient Instructions on file for this visit.

## 2022-03-30 RX ORDER — FLUCONAZOLE 100 MG/1
100 TABLET ORAL DAILY
Qty: 7 TABLET | Refills: 0 | Status: SHIPPED | OUTPATIENT
Start: 2022-03-30 | End: 2022-04-06

## 2022-03-30 NOTE — TELEPHONE ENCOUNTER
Brooke Dandy \"Lakshmi\"    3/30/22 8:30 AM  Need prescription for Diflucan sent to [facility] in [place].     I had an appointment with Dr. Derick Klein on Monday afternoon and she had prescribed an antibiotic and Diflucan. When i picked up my meds from [facility], the prescription for Diflucan was not sent to the pharmacy. Thanks!

## 2022-04-03 ASSESSMENT — ENCOUNTER SYMPTOMS
EYE PAIN: 0
EYE ITCHING: 1
NAUSEA: 0
SINUS PRESSURE: 1
VOICE CHANGE: 1
VOMITING: 0
SORE THROAT: 1
EYE DISCHARGE: 1
ABDOMINAL PAIN: 0
RHINORRHEA: 1
SINUS PAIN: 0
COUGH: 1
DIARRHEA: 0
SHORTNESS OF BREATH: 0
WHEEZING: 0

## 2022-06-10 ENCOUNTER — TELEPHONE (OUTPATIENT)
Dept: FAMILY MEDICINE CLINIC | Age: 62
End: 2022-06-10

## 2022-06-10 DIAGNOSIS — E06.3 CHRONIC LYMPHOCYTIC THYROIDITIS: Primary | ICD-10-CM

## 2022-06-10 RX ORDER — LEVOTHYROXINE AND LIOTHYRONINE 76; 18 UG/1; UG/1
120 TABLET ORAL DAILY
Qty: 30 TABLET | Refills: 3 | Status: SHIPPED | OUTPATIENT
Start: 2022-06-10

## 2022-06-10 NOTE — TELEPHONE ENCOUNTER
Received lab work  Normal kidney panel electrolytes liver function  Thyroid low and need to increase dose and recheck in 3 months with visit to follow Ch[facility] up at [facility] with  mail diet info

## 2022-06-13 NOTE — TELEPHONE ENCOUNTER
Patient informed and cholesterol diet mailed . Patient said she see another doctor for thyroid and does not need medication from Dr Stacia Chiang . Called the pharmacy and cancel the medication .

## 2022-07-07 ENCOUNTER — OFFICE VISIT (OUTPATIENT)
Dept: FAMILY MEDICINE CLINIC | Age: 62
End: 2022-07-07
Payer: COMMERCIAL

## 2022-07-07 VITALS
WEIGHT: 158 LBS | SYSTOLIC BLOOD PRESSURE: 124 MMHG | BODY MASS INDEX: 29.83 KG/M2 | HEIGHT: 61 IN | OXYGEN SATURATION: 98 % | HEART RATE: 81 BPM | TEMPERATURE: 98.4 F | DIASTOLIC BLOOD PRESSURE: 70 MMHG

## 2022-07-07 DIAGNOSIS — G89.29 CHRONIC PAIN IN LEFT SHOULDER: Primary | ICD-10-CM

## 2022-07-07 DIAGNOSIS — M54.12 CERVICAL RADICULOPATHY: ICD-10-CM

## 2022-07-07 DIAGNOSIS — M25.512 CHRONIC PAIN IN LEFT SHOULDER: Primary | ICD-10-CM

## 2022-07-07 PROCEDURE — 99213 OFFICE O/P EST LOW 20 MIN: CPT | Performed by: NURSE PRACTITIONER

## 2022-07-07 RX ORDER — METHYLPREDNISOLONE 4 MG/1
TABLET ORAL
Qty: 1 KIT | Refills: 0 | Status: SHIPPED | OUTPATIENT
Start: 2022-07-07 | End: 2022-07-13

## 2022-07-07 RX ORDER — TIZANIDINE 4 MG/1
4 TABLET ORAL 3 TIMES DAILY
Qty: 30 TABLET | Refills: 0 | Status: SHIPPED | OUTPATIENT
Start: 2022-07-07 | End: 2022-07-17

## 2022-07-07 ASSESSMENT — ENCOUNTER SYMPTOMS
COUGH: 0
ABDOMINAL PAIN: 0

## 2022-07-07 NOTE — PATIENT INSTRUCTIONS
Patient Education        Shoulder Stretches: Exercises  Introduction  Here are some examples of exercises for you to try. The exercises may be suggested for a condition or for rehabilitation. Start each exercise slowly. Ease off the exercises if you start to have pain. You will be told when to start these exercises and which ones will work bestfor you. How to do the exercises  Shoulder stretch    1.  a doorway and place one arm against the door frame. Your elbow should be a little higher than your shoulder. 2. Relax your shoulders as you lean forward, allowing your chest and shoulder muscles to stretch. You can also turn your body slightly away from your arm to stretch the muscles even more. 3. Hold for 15 to 30 seconds. 4. Repeat 2 to 4 times with each arm. Shoulder and chest stretch    1. Shoulder and chest stretch  2. While sitting, relax your upper body so you slump slightly in your chair. 3. As you breathe in, straighten your back and open your arms out to the sides. 4. Gently pull your shoulder blades back and downward. 5. Hold for 15 to 30 seconds as your breathe normally. 6. Repeat 2 to 4 times. Overhead stretch    1. Reach up over your head with both arms. 2. Hold for 15 to 30 seconds. 3. Repeat 2 to 4 times. Follow-up care is a key part of your treatment and safety. Be sure to make and go to all appointments, and call your doctor if you are having problems. It's also a good idea to know your test results and keep alist of the medicines you take. Where can you learn more? Go to https://britany.[employer]. org and sign in to your [employer] account. Enter S254 in the [employer] box to learn more about \"Shoulder Stretches: Exercises. \"     If you do not have an account, please click on the \"Sign Up Now\" link. Current as of: March 9, 2022               Content Version: 13.3  © 7568-4190 Healthwise, Incorporated.    Care instructions adapted under license by [employer]. If you have questions about a medical condition or this instruction, always ask your healthcare professional. [address] any warranty or liability for your use of this information. Patient Education        Shoulder Blade: Exercises  Introduction  Here are some examples of exercises for you to try. The exercises may be suggested for a condition or for rehabilitation. Start each exercise slowly. Ease off the exercises if you start to have pain. You will be told when to start these exercises and which ones will work bestfor you. How to do the exercises  Shoulder roll    1. Stand tall with your chin slightly tucked. Imagine that a string at the top of your head is pulling you straight up. 2. Keep your arms relaxed. All motion will be in your shoulders. 3. Shrug your shoulders up toward your ears, then up and back. [place] your shoulders down and back, like you're sliding your hands down into your back pants pockets. 4. Repeat the circles at least 2 to 4 times. 5. This exercise is also helpful anytime you want to relax. Lower neck and upper back stretch    1. With your arms about shoulder height, clasp your hands in front of you. 2. Drop your chin toward your chest.  3. Reach straight forward so you are rounding your upper back. Think about pulling your shoulder blades apart. Sally Enrique feel a stretch across your upper back and shoulders. Hold for at least 6 seconds. 4. Repeat 2 to 4 times. Triceps stretch    1. Reach your arm straight up. 2. Keeping your elbow in place, bend your arm and reach your hand down behind your back. 3. With your other hand, apply gentle pressure to the bent elbow. Sally Enrique feel a stretch at the back of your upper arm and shoulder. Hold about 6 seconds. 4. Repeat 2 to 4 times with each arm. Shoulder stretch    1. Relax your shoulders.   2. Raise one arm to shoulder height, and reach it across your chest.  3. Pull the arm slightly toward you with your other arm. This will help you get a gentle stretch. Hold for about 6 seconds. 4. Repeat 2 to 4 times. Shoulder blade squeeze    1. Sit or stand up tall with your arms at your sides. 2. Keep your shoulders relaxed and down, not shrugged. 3. Squeeze your shoulder blades together. Hold for 6 seconds, then relax. 4. Repeat 8 to 12 times. Straight-arm shoulder blade squeeze    1. Sit or stand tall. Relax your shoulders. 2. With palms down, hold your elastic tubing or band straight out in front of you. 3. Start with slight tension in the tubing or band, with your hands about shoulder-width apart. 4. Slowly pull straight out to the sides, squeezing your shoulder blades together. Keep your arms straight and at shoulder height. Slowly release. 5. Repeat 8 to 12 times. Rowing    1. [place] your elastic tubing or band at about waist height. Take one end in each hand. 2. Sit or stand with your feet hip-width apart. 3. Hold your arms straight in front of you. Adjust your distance to create slight tension in the tubing or band. 4. Slightly tuck your chin. Relax your shoulders. 5. Without shrugging your shoulders, pull straight back. Your elbows will pass alongside your waist.  Pull-downs    1. [place] your elastic tubing or band in the top of a closed door. Take one end in each hand. 2. Either sit or stand, depending on what is more comfortable. If you feel unsteady, sit on a chair. 3. Start with your arms up and comfortably apart, elbows straight. There should be a slight tension in the tubing or band. 4. Slightly tuck your chin, and look straight ahead. 5. Keeping your back straight, slowly pull down and back, bending your elbows. 6. Stop where your hands are level with your chin, in a \"goalpost\" position. 7. Repeat 8 to 12 times. Chest T stretch    1. Lie on your back. Raise your knees so they are bent. Plant your feet on the floor, hip-width apart. 2. Tuck your chin, and relax your shoulders.   3. Reach your arms straight out to the sides. If you don't feel a mild stretch in your shoulders and across your chest, use a foam roll or a tightly rolled blanket under your spine, from your tailbone to your head. 4. Relax in this position for at least 15 to 30 seconds while you breathe normally. Repeat 2 to 4 times. 5. As you get used to this stretch, keep adding a little more time until you are able relax in this position for 2 or 3 minutes. When you can relax for at least 2 minutes, you only need to do the exercise 1 time per session. Chest goalpost stretch    1. Lie on your back. Raise your knees so they are bent. Plant your feet on the floor, hip-width apart. 2. Tuck your chin, and relax your shoulders. 3. Reach your arms straight out to the sides. 4. Bend your arms at the elbows, with your hands pointed toward the top of your head. Your arms should make an L on either side of your head. Your palms should be facing up. 5. If you don't feel a mild stretch in your shoulders and across your chest, use a foam roll or tightly rolled blanket under your spine, from your tailbone to your head. 6. Relax in this position for at least 15 to 30 seconds while you breathe normally. Repeat 2 to 4 times. 7. Each day you do this exercise, add a little more time until you can relax in this position for 2 or 3 minutes. When you can relax for at least 2 minutes, you only need to do the exercise 1 time per session. Follow-up care is a key part of your treatment and safety. Be sure to make and go to all appointments, and call your doctor if you are having problems. It's also a good idea to know your test results and keep alist of the medicines you take. Where can you learn more? Go to https://[employer]. org and sign in to your [employer] account. Enter [phone] in the [employer] box to learn more about \"Shoulder Blade: Exercises. \"     If you do not have an account, please click on the \"Sign Up Now\" link.  Current as of: March 9, 2022               Content Version: 13.3  © 9252-4436 Healthwise, Incorporated. Care instructions adapted under license by [facility] ([facility]). If you have questions about a medical condition or this instruction, always ask your healthcare professional. [address] any warranty or liability for your use of this information.

## 2022-07-07 NOTE — PROGRESS NOTES
Randi Hassan (:  1960) is a 60 y.o. female,Established patient, here for evaluation of the following chief complaint(s):  Shoulder Pain (left side running down arm )         ASSESSMENT/PLAN:  1. Chronic pain in left shoulder  Take medications as prescribed. Follow up with ortho  - Jacques Thibodeaux MD, Orthopedic Surgery (Primary Care Sports Medicine), [facility]    2. Cervical radiculopathy  Take medications as prescribed. Follow up with ortho  - Jacques Thibodeaux MD, Orthopedic Surgery (Primary Care Sports Medicine), [facility]       Return if symptoms worsen or fail to improve. Subjective   SUBJECTIVE/OBJECTIVE:  HPI   Presents to office today with c/o worsening chronic neck/left shoulder pain. Over the last 3-4 months pain is now more frequent. Sharp shooting in nature, self resolved. States she works from home, right arm/hand dominant. States she seen chiropractor in the past. Reports monthly massages, last massage hit an area in her left shoulder that sent sharp shooting pain into the shoulder and arm. States the pain is more in the left shoulder and into the front. Denies numnbness or tingling in arm. Associated weakness in shoulder, unable to lift. States she has tried exercise bands and weights to help strenghten. No relief with advil. Pt did not get the MRI ordered last year. Current Outpatient Medications   Medication Sig Dispense Refill    methylPREDNISolone (MEDROL DOSEPACK) 4 MG tablet Take by mouth.  1 kit 0    tiZANidine (ZANAFLEX) 4 MG tablet Take 1 tablet by mouth 3 times daily for 10 days 30 tablet 0    thyroid (ARMOUR THYROID) 120 MG tablet Take 1 tablet by mouth daily 30 tablet 3    valACYclovir (VALTREX) 500 MG tablet Take 1 tablet by mouth daily 90 tablet 1    PREVACID 30 MG delayed release capsule Take 1 capsule by mouth daily 90 capsule 3    Ascorbic Acid (VITAMIN C PO) Take 1 tablet by mouth daily      Probiotic Product (PROBIOTIC PO) Take 1 capsule by mouth daily      Hormone Cream Base (HRT CREAM BASE) CREA daily      polyethylene glycol (GLYCOLAX) packet Take 17 g by mouth daily as needed for Constipation      Cyanocobalamin (VITAMIN B-12) 2500 MCG SUBL Place 2,500 mcg under the tongue daily      Cholecalciferol (VITAMIN D3) 5000 units TABS Take 1 tablet by mouth daily       Omega-3 Fatty Acids (FISH OIL) 1000 MG CPDR Take 1,000 mg by mouth daily       NONFORMULARY ProGad Enhancer      NONFORMULARY L-5MTF-daily      NONFORMULARY Pro DHA 1000-daily      NONFORMULARY Take 1 tablet by mouth A-CYSTEINE/ARG ZN/GLUT/MV-MN (L GLUTAMINE-N ACET-ARG-VIT-MIN ORAL)-daily       No current facility-administered medications for this visit. Past Medical History:   Diagnosis Date    Anxiety     Cholestasis during pregnancy     cholestasis of pregnancy over 25 years ago     Chronic constipation     Chronic lymphocytic thyroiditis     COVID-19 12/2021    GERD (gastroesophageal reflux disease)     Gluten intolerance     Hypothyroidism     Irritable bowel syndrome         Review of Systems   Constitutional: Positive for activity change. Negative for fatigue. Respiratory: Negative for cough. Cardiovascular: Negative for chest pain. Gastrointestinal: Negative for abdominal pain. Musculoskeletal: Positive for arthralgias (left shoulder) and neck pain. Skin: Negative. Neurological: Positive for weakness (left arm). Psychiatric/Behavioral: Positive for sleep disturbance (pain in shoulder wakes her up sometimes). Objective   Physical Exam  Constitutional:       General: She is not in acute distress. HENT:      Head: Normocephalic and atraumatic. Cardiovascular:      Rate and Rhythm: Normal rate and regular rhythm. Heart sounds: Normal heart sounds. Pulmonary:      Effort: Pulmonary effort is normal.      Breath sounds: Normal breath sounds.    Musculoskeletal:      Right shoulder: No tenderness or bony tenderness. Normal range of motion. Left shoulder: Tenderness and bony tenderness present. No swelling or deformity. Decreased range of motion. Cervical back: Normal range of motion. Spinous process tenderness and muscular tenderness (left paraspinal into left shoulder) present. Skin:     General: Skin is warm and dry. Capillary Refill: Capillary refill takes less than 2 seconds. Neurological:      Mental Status: She is alert.               --Juan Pablo Taveras, APRN - NP

## 2022-07-19 ENCOUNTER — OFFICE VISIT (OUTPATIENT)
Dept: ORTHOPEDIC SURGERY | Age: 62
End: 2022-07-19
Payer: COMMERCIAL

## 2022-07-19 VITALS — HEIGHT: 61 IN | WEIGHT: 155 LBS | BODY MASS INDEX: 29.27 KG/M2

## 2022-07-19 DIAGNOSIS — M54.2 NECK PAIN: Primary | ICD-10-CM

## 2022-07-19 DIAGNOSIS — M67.912 TENDINOPATHY OF LEFT ROTATOR CUFF: ICD-10-CM

## 2022-07-19 DIAGNOSIS — M50.30 DEGENERATIVE DISC DISEASE, CERVICAL: ICD-10-CM

## 2022-07-19 DIAGNOSIS — M25.512 LEFT SHOULDER PAIN, UNSPECIFIED CHRONICITY: ICD-10-CM

## 2022-07-19 DIAGNOSIS — M75.42 IMPINGEMENT SYNDROME OF LEFT SHOULDER: ICD-10-CM

## 2022-07-19 PROCEDURE — 20610 DRAIN/INJ JOINT/BURSA W/O US: CPT | Performed by: FAMILY MEDICINE

## 2022-07-19 PROCEDURE — 99203 OFFICE O/P NEW LOW 30 MIN: CPT | Performed by: FAMILY MEDICINE

## 2022-07-19 RX ORDER — DICLOFENAC SODIUM 75 MG/1
75 TABLET, DELAYED RELEASE ORAL 2 TIMES DAILY
Qty: 60 TABLET | Refills: 3 | Status: SHIPPED | OUTPATIENT
Start: 2022-07-19

## 2022-07-19 RX ORDER — BETAMETHASONE SODIUM PHOSPHATE AND BETAMETHASONE ACETATE 3; 3 MG/ML; MG/ML
12 INJECTION, SUSPENSION INTRA-ARTICULAR; INTRALESIONAL; INTRAMUSCULAR; SOFT TISSUE ONCE
Status: COMPLETED | OUTPATIENT
Start: 2022-07-19 | End: 2022-07-19

## 2022-07-19 RX ORDER — BUPIVACAINE HYDROCHLORIDE 2.5 MG/ML
4 INJECTION, SOLUTION INFILTRATION; PERINEURAL ONCE
Status: COMPLETED | OUTPATIENT
Start: 2022-07-19 | End: 2022-07-19

## 2022-07-19 RX ORDER — LIDOCAINE HYDROCHLORIDE 10 MG/ML
3 INJECTION, SOLUTION INFILTRATION; PERINEURAL ONCE
Status: COMPLETED | OUTPATIENT
Start: 2022-07-19 | End: 2022-07-19

## 2022-07-19 RX ADMIN — BETAMETHASONE SODIUM PHOSPHATE AND BETAMETHASONE ACETATE 12 MG: 3; 3 INJECTION, SUSPENSION INTRA-ARTICULAR; INTRALESIONAL; INTRAMUSCULAR; SOFT TISSUE at 11:43

## 2022-07-19 RX ADMIN — BUPIVACAINE HYDROCHLORIDE 10 MG: 2.5 INJECTION, SOLUTION INFILTRATION; PERINEURAL at 11:43

## 2022-07-19 RX ADMIN — LIDOCAINE HYDROCHLORIDE 3 ML: 10 INJECTION, SOLUTION INFILTRATION; PERINEURAL at 11:44

## 2022-07-19 SDOH — HEALTH STABILITY: PHYSICAL HEALTH: ON AVERAGE, HOW MANY DAYS PER WEEK DO YOU ENGAGE IN MODERATE TO STRENUOUS EXERCISE (LIKE A BRISK WALK)?: 3 DAYS

## 2022-07-19 SDOH — HEALTH STABILITY: PHYSICAL HEALTH: ON AVERAGE, HOW MANY MINUTES DO YOU ENGAGE IN EXERCISE AT THIS LEVEL?: 60 MIN

## 2022-07-19 NOTE — PROGRESS NOTES
Chief Complaint  Shoulder Pain (NP L SHOULDER) and Neck Pain      Initial consultation for onset left shoulder pain with mild weakness and history of chronic cervical pain with myofascial pain    History of Present Illness:  Silas Ingram is a 60 y.o. female who is a very pleasant right-hand-dominant white female who is a  who is currently laid off and is a very nice patient of Dr. Avila Ardon who is being seen today in kind consultation from Wayne Vasquez CNP for evaluation of chronic cervical myofascial pain but newer onset of pain to her left shoulder. She states she has a longstanding history of tightness to the cervical spine and typically does get massages 1 time per month and this has been a longstanding issue. She did have an initial evaluation with imaging in summer 2021 where x-rays did reveal degenerative disc changes and loss of cervical lordosis at C5-6 but at that point was not really complaining of associated shoulder or arm discomfort. There is no history of injury or no activity prior to becoming symptomatic and has occasionally taken Advil and Aleve for this and once again does get her massage treatments 1 time per month. She does have a newer complaint that of soreness and stiffness to her shoulder and into the upper arm since roughly April 2022. Once again there is no history of injury no activity prior to coming symptomatic although she does have a chronic achy type sensation over the greater tuberosity extending to the upper arm. This is once again failed to respond to her massage therapy and Advil over-the-counter prompting today's consultation after seeing Wayne Vasquez CNP on 7/7/2022.      Pain Assessment  Location of Pain: Neck  Location Modifiers: Left  Severity of Pain: 2  Quality of Pain: Dull, Aching  Duration of Pain: Persistent  Frequency of Pain: Constant  Limiting Behavior: Yes  Relieving Factors: Rest  Result of Injury: No  Work-Related Injury: No  Are there other pain locations you wish to document?: No       Medical History  Patient's medications, allergies, past medical, surgical, social and family histories were reviewed and updated as appropriate. Review of Systems  Pertinent items are noted in HPI  Review of systems reviewed from Patient History Form dated on 7/19/2022 and available in the patient's chart under the Media tab. Vital Signs  There were no vitals filed for this visit. General Exam:     Constitutional: Patient is adequately groomed with no evidence of malnutrition  DTRs: Deep tendon reflexes are intact  Mental Status: The patient is oriented to time, place and person. The patient's mood and affect are appropriate. Lymphatic: The lymphatic examination bilaterally reveals all areas to be without enlargement or induration. Vascular: Examination reveals no swelling or calf tenderness. Peripheral pulses are palpable and 2+. Neurological: The patient has good coordination. There is no weakness or sensory deficit. Cervical spine Examination  Inspection: She does have some lower cervical paraspinal myofascial tenderness with extension to the left greater than right trapezius without periscapular discomfort. No high-grade deformity or palpable spasm. No swelling. Palpation: She does have clinical tenderness over the lower cervical paraspinals with several trapezial trigger points. Rang of Motion: She does have very reasonable cervical motion without high-grade discomfort with axial load testing. Strength: There is intact cervical isometric strength and there is not appear to be focal upper extremity motor deficits related to her cervical spine. Special Tests: Once again mild pain with axial load testing and facet loading but does not radiate pain to the upper extremities. Spurling's and Miguel's do appear to be negative today. Skin: There are no rashes, ulcerations or lesions.  Distal motor mild facet arthropathy without high-grade spondylolisthesis or evidence of acute osseous injury. Assessment: #1.  3+ month status post symptomatic left shoulder pain with suspected cuff tendinopathy with impingement and mild AC arthropathy. 2.  Chronic cervical myofascial pain with underlying C5-6 cervical degenerative disc disease without current evidence of high-grade radiculopathy    Impression:  Encounter Diagnoses   Name Primary? Neck pain Yes    Left shoulder pain, unspecified chronicity     Degenerative disc disease, cervical     Tendinopathy of left rotator cuff     Impingement syndrome of left shoulder        Office Procedures:  Orders Placed This Encounter   Procedures    XR CERVICAL SPINE (2-3 VIEWS)     Standing Status:   Future     Number of Occurrences:   1     Standing Expiration Date:   7/19/2023    XR SHOULDER LEFT (MIN 2 VIEWS)     3V Lt Shoulder     Standing Status:   Future     Number of Occurrences:   1     Standing Expiration Date:   7/19/2023     Order Specific Question:   Reason for exam:     Answer:   Shoulder Pain    [facility]     Referral Priority:   Routine     Referral Type:   Eval and Treat     Referral Reason:   Specialty Services Required     Requested Specialty:   Physical Therapist     Number of Visits Requested:   1 [id number] - [place] DRAIN/INJECT LARGE JOINT/BURSA       Treatment Plan:  Treatment options were discussed with Lynsey Real. We did review her current plain films and exam findings. She has had longstanding cervical myofascial pain and does have underlying cervical degenerative disc changes but I am not highly suspicious of a high-grade radiculopathy currently as I think the majority of her symptoms are likely related to her left shoulder. We discussed further work-up with imaging however she does wish to try initial conservative treatment.   After discussing options, we did perform a left shoulder subacromial injection today using 2 cc of Celestone, 4 cc of Marcaine, 3 cc of Xylocaine. We will start her on diclofenac 75 mg 1 pill twice daily with food was counseled on potential side effects. She does live out in [place] we will start her in physical therapy at the [facility] location and we will see her back in 4 weeks and consider imaging if she is failing to improve. Icing and activity modification was discussed as well as the importance of performing home-based exercises. She will contact us in the interim with questions or concerns. CC: Dr. Santosh Hayes and Jeevan Lane CNP      This dictation was performed with a verbal recognition program [facility]) and it was checked for errors. It is possible that there are still dictated errors within this office note. If so, please bring any errors to my attention for an addendum. All efforts were made to ensure that this office note is accurate.

## 2022-08-02 ENCOUNTER — HOSPITAL ENCOUNTER (OUTPATIENT)
Dept: PHYSICAL THERAPY | Age: 62
Setting detail: THERAPIES SERIES
Discharge: HOME | End: 2022-08-02
Payer: COMMERCIAL

## 2022-08-02 PROCEDURE — 97161 PT EVAL LOW COMPLEX 20 MIN: CPT

## 2022-08-02 PROCEDURE — 97110 THERAPEUTIC EXERCISES: CPT

## 2022-08-02 NOTE — PROGRESS NOTES
[facility], [place] [address]. [place] [address]  Phone: [phone]   Fax:     [phone]        Shanda Reinoso    Dear Dr. Deepak Pittman,    We had the pleasure of evaluating the following patient for physical therapy services at [facility]. A summary of our findings can be found in the initial assessment below. This includes our plan of care. If you have any questions or concerns regarding these findings, please do not hesitate to contact me at the office phone number checked above. Thank you for the referral.     RECOMMEND DRY NEEDLING      Physician Signature:_______________________________Date:__________________  By signing above (or electronic signature), therapists plan is approved by physician        Patient: Rebecca Kaminski   : 1960   MRN: [id number]  Referring Physician: Dr. Lindy Luis      Evaluation Date: 2022      Medical Diagnosis Information:  Diagnosis: Neck pain (M54.2); Left shoulder pain, unspecified chronicity (M25.512); Degenerative disc disease, cervical (M50.30); Tendinopathy of left rotator cuff (I40.289); Impingement syndrome of left shoulder (M75.42)   Treatment Diagnosis: Decreased ADL status (Z73.6)                                         Insurance information: PT Insurance Information: BCBS    Precautions/ Contra-indications: none  Latex Allergy:  [x]NO      []YES  Preferred Language for Healthcare:   [x]English       []other:    C-SSRS Triggered by Intake questionnaire (Past 2 wk assessment ):   [x] No, Questionnaire did not trigger screening.   [] Yes, Patient intake triggered C-SSRS Screening      [] C-SSRS Screening completed  [] PCP notified via Epic     SUBJECTIVE: Patient stated she has \"issues\" at the top of the L shoulder.   Pt had cortisone shot which \"helped a lot, I have a little bit of pain every once in a while. \"  Prior to the cortisone shot pt had a constant ache rated 7/10 and the top of the L lateral humerus had pain and the lateral L forearm \"had tingling. \" Pt had pain with horizontal adduction when moving the covers on her bed. Pt stated she has massages once a month which helped to relieve pain \"for a couple days\". Pt used to see a chiropractor for 30 years for neck related issues. Pt feels like stress at her former job for 24 years contributed to her chronic neck muscular tightness and neck pain. Relevant Medical History: see below  Functional Disability Index: FOTO Shoulder = 75    Pain Scale: 1/10  Easing factors: rest  Provocative factors: horizontal adduction     Type: []Constant   [x]Intermittent  []Radiating []Localized []other:     Numbness/Tingling: none currently    Occupation/School:     Living Status/Prior Level of Function: Independent with ADLs and IADLs    OBJECTIVE:   Posture: B scapulae downward rotated, minimal Dowanger's hump    Functional Mobility/Transfers:  I    Palpation:hypertonic B levator scapulae and scalenes    Bandages/Dressings/Incisions: NA    Gait: (include devices/WB status): WNL     AROM Cervical Spine 8/2/22   Flexion 35   Extension 45, c/o \"tight\" lower cervical   SB L 18   SB R 22   Rot L 50   Rot R 70                    UE AROM Left Right   Shoulder Flex 175 183   Shoulder Abd 175 185   Shoulder ER [facility]   Shoulder IR [facility]   Elbow flex/ext [facility]   Wrist flex/ext/pro/sup [facility]   Finger flex/ext/opposition [facility] WFL   IR behind back [facility]   UE Strength  Left Right   Shoulder Flex 4/5 5/5   Shoulder ABd (C5 Axillary) 4+/5 5/5   Shoulder ER  4/5 5/5   Shoulder IR 5/5 5/5   Elbow Flex (C5 Musc) 5/5 5/5   Elbow Ext (C7 Radial) 5/5 5/5   Wrist Flex (C6 Radial) 5/5 5/5   Wrist Ext (C7 Radial) 5/5 5/5   Scapular strength     Lats 5/5 5/5   Mid traps 4-/5 4/5   Low traps 3+/5 4-/5           Reflexes Normal Abnormal Comments   Shultz normal     Clonus normal     S1-2 Seated achilles [x] []    S1-2 Prone knee bend [] []    L3-4 Patellar tendon [x] []    C5-6 Biceps [x] []    C6 Brachioradialis [x] []    C7-8 Triceps [x] []      Reflexes/Sensation:    [x]Dermatomes/Myotomes intact    [x]Reflexes equal and normal bilaterally   []Other:    Joint mobility: hypomobile L first and second ribs, pain third costotransverse joint L; hypomobile C7/T1, C6/C7, C5/C6   []Normal    [x]Hypo   []Hyper      Orthopedic Special Tests:      Cluster Testing  Normal Abnormal N/A Comments   Babinski Test [x] [] []    Shultz Test [x] [] []    Inverted Sup Sign [] [] []    Alar Ligament Test [] [] []    Transverse Ligament Test [] [] []    Sharp-Ksenia Test [] [] []    Hautards Test [] [] []    Vertebral Artery Test [x] [] []             Neural dynamic/ Tension testing Normal Abnormal N/A Comments   Spurling Maneuver:  [] [] []    Distraction testing: [] [] []    ULNT [] [] []    Shoulder Abd testing  [] [] []    Cerv Rot/Lat Flex- 1st Rib [] [] []    Deep Neck Flex/endurance testing [] [] []    Craniocerv Flex testing Revonda Ip [] [] []    End Range Tolerance testing. [] [] []     [] [] []                           [x] Patient history, allergies, meds reviewed. Medical chart reviewed. See intake form. Review Of Systems (ROS):  [x]Performed Review of systems (Integumentary, CardioPulmonary, Neurological) by intake and observation. Intake form has been scanned into medical record. Patient has been instructed to contact their primary care physician regarding ROS issues if not already being addressed at this time.       Co-morbidities/Complexities (which will affect course of rehabilitation):   []None           Arthritic conditions   []Rheumatoid arthritis (M05.9)  []Osteoarthritis (M19.91)   Cardiovascular conditions   []Hypertension (I10)  []Hyperlipidemia (E78.5)  []Angina pectoris (I20)  []Atherosclerosis (I70)  []CVA Musculoskeletal conditions   []Disc pathology   []Congenital spine pathologies   []Prior surgical intervention  []Osteoporosis (M81.8)  []Osteopenia (M85.8)   Endocrine conditions   [x]Hypothyroid (E03.9)  []Hyperthyroid Gastrointestinal conditions   []Constipation (I20.54)   Metabolic conditions   []Morbid obesity (E66.01)  []Diabetes type 1(E10.65) or 2 (E11.65)   []Neuropathy (G60.9)     Pulmonary conditions   []Asthma (J45)  []Coughing   []COPD (J44.9)   Psychological Disorders  [x]Anxiety (F41.9)  []Depression (F32.9)   []Other:   []Other:          Barriers to/and or personal factors that will affect rehab potential:              []Age  []Sex   []Smoker              []Motivation/Lack of Motivation                        []Co-Morbidities              []Cognitive Function, education/learning barriers              []Environmental, home barriers              []profession/work barriers  []past PT/medical experience  []other:  Justification:     Falls Risk Assessment (30 days):   [x] Falls Risk assessed and no intervention required.   [] Falls Risk assessed and Patient requires intervention due to being higher risk   TUG score (>12s at risk):     [] Falls education provided, including     ASSESSMENT:    Functional Impairments:     [x]Noted cervical/thoracic/GHJ joint hypomobility   []Noted cervical/thoracic/GHJ joint hypermobility   [x]Decreased cervical/UE functional ROM   []Noted Headache pain aggravated by neck movements with/without dizziness   []Abnormal reflexes/sensation/myotomal/dermatomal deficits   [x]Decreased DCF control or ability to hold head up   [x]Decreased RC/scapular/core strength and neuromuscular control    [x]Decreased UE functional strength   []other:      Functional Activity Limitations (from functional questionnaire and intake)   [x]Reduced ability to tolerate prolonged functional positions   []Reduced ability or difficulty with changes of positions or transfers between positions   [x]Reduced ability to maintain good posture and demonstrate good body mechanics with sitting, bending, and lifting   [x] Reduced ability or tolerance with driving and/or computer work   []Reduced ability to perform lifting, reaching, carrying tasks   []Reduced ability to concentrate   [x]Reduced ability to sleep    []Reduced ability to tolerate any impact through UE or spine   []Reduced ability to ambulate prolonged functional periods/distances   []other:    Participation Restrictions   []Reduced participation in self care activities   []Reduced participation in home management activities   [x]Reduced participation in work activities   []Reduced participation in social activities. [x]Reduced participation in sport/recreational activities. Classification/Subgrouping:   [x]signs/symptoms consistent with neck pain with mobility deficits     [x]signs/symptoms consistent with neck pain with movement coordinated impairments    []signs/symptoms consistent with neck pain with radiating pain    []signs/symptoms consistent with neck pain with headaches (cervicogenic)    []Signs/symptoms consistent with nerve root involvement including myotome & dermatome dysfunction   []sign/symptoms consistent with facet dysfunction of cervical and thoracic spine    []signs/symptoms consistent suggesting central cord compression/UMN syndromes   []signs/symptoms consistent with discogenic cervical pain   [x]signs/symptoms consistent with rib dysfunction   [x]signs/symptoms consistent with postural dysfunction   []signs/symptoms consistent with shoulder pathology    []signs/symptoms consistent with post-surgical status including decreased ROM, strength and function.    []signs/symptoms consistent with pathology which may benefit from Dry Needling   []signs/symptoms which may limit the use of advanced manual therapy techniques: (Elevated CV risk profile, recent trauma, intolerance to end range positions, prior TIA, visual issues, UE neurological compromise )     Prognosis/Rehab Potential: []Excellent   [x]Good    []Fair   []Poor    Tolerance of evaluation/treatment:    []Excellent   [x]Good    []Fair   []Poor    Physical Therapy Evaluation Complexity Justification  [x] A history of present problem with:  [] no personal factors and/or comorbidities that impact the plan of care;  [x]1-2 personal factors and/or comorbidities that impact the plan of care  []3 personal factors and/or comorbidities that impact the plan of care  [x] An examination of body systems using standardized tests and measures addressing any of the following: body structures and functions (impairments), activity limitations, and/or participation restrictions;:  [x] a total of 1-2 or more elements   [] a total of 3 or more elements   [] a total of 4 or more elements   [x] A clinical presentation with:  [x] stable and/or uncomplicated characteristics   [] evolving clinical presentation with changing characteristics  [] unstable and unpredictable characteristics;   [x] Clinical decision making of [] low, [] moderate, [] high complexity using standardized patient assessment instrument and/or measurable assessment of functional outcome. [x] EVAL (LOW) 21549 (typically 20 minutes face-to-face)  [] EVAL (MOD) 29024 (typically 30 minutes face-to-face)  [] EVAL (HIGH) 63480 (typically 45 minutes face-to-face)  [] RE-EVAL       PLAN:   Frequency/Duration:  1-2 days per week for 4 Weeks:  Interventions:  [x]  Therapeutic exercise including: strength training, ROM, for cervical spine,scapula, core and Upper extremity, including postural re-education. [x]  NMR activation and proprioception for Deep cervical flexors, periscapular and RC muscles and Core, including postural re-education. [x]  Manual therapy as indicated for C/T spine, ribs, Soft tissue to include: Dry Needling/IASTM, STM, PROM, Gr I-IV mobilizations, manipulation.    [x] Modalities as needed that may include: thermal agents, E-stim, Biofeedback, US, iontophoresis as indicated  [x] Patient education on joint protection, postural re-education, activity modification, progression of HEP. HEP instruction:    Access Code: [email or web address]  URL: [email or web address]  Date: 08/02/2022  Prepared by: Froilan Arceo    Exercises  Standing Scapular Retraction - 1 x daily - 7 x weekly - 10 reps - 5 hold  Standing Shoulder Shrugs - 1 x daily - 7 x weekly - 10 reps - 5 hold  Corner Pec Major Stretch - 1 x daily - 7 x weekly - 3 reps - 15 hold  Prone Scapular Slide with Shoulder Extension - 1 x daily - 7 x weekly - 10 reps - 5 hold  Prone Scapular Retraction Arms at Side - 1 x daily - 7 x weekly - 10 reps - 5 hold  Supine Chin Tuck - 1 x daily - 7 x weekly - 10 reps - 5 hold      GOALS:  Patient stated goal: \"no pain when the shot wears off\"  [] Progressing: [] Met: [] Not Met: [] Adjusted    Therapist goals for Patient:   Short Term Goals: To be achieved in: 2 weeks  1. Independent in HEP and progression per patient tolerance, in order to prevent re-injury. [] Progressing: [] Met: [] Not Met: [] Adjusted  2. Patient will have a decrease in pain to facilitate improvement in movement, function, and ADLs as indicated by Functional Deficits. [] Progressing: [] Met: [] Not Met: [] Adjusted    Long Term Goals: To be achieved in: 4 weeks  1. FOTO Shoulder will score 77 to assist with reaching prior level of function. [] Progressing: [] Met: [] Not Met: [] Adjusted  2. Patient will demonstrate increased AROM to [facility] of cervical/thoracic spine to allow for proper joint functioning as indicated by patients Functional Deficits. [] Progressing: [] Met: [] Not Met: [] Adjusted  3. Patient will demonstrate an increase in postural awareness and control and activation of  Deep cervical stabilizers to allow for proper functional mobility as indicated by patients Functional Deficits. [] Progressing: [] Met: [] Not Met: [] Adjusted  4.  Patient will return working a full shift without increased symptoms

## 2022-08-02 NOTE — FLOWSHEET NOTE
[facility] [place] [address]. [place] [address]  Phone: [phone]   Fax:     [phone]    Physical Therapy Treatment Note/ Progress Report:     Date:  2022    Patient Name:  Cody Lawler    :  1960  MRN: [id number]    Pertinent Medical History:      Medical/Treatment Diagnosis Information:  Diagnosis: Neck pain (M54.2); Left shoulder pain, unspecified chronicity (M25.512); Degenerative disc disease, cervical (M50.30); Tendinopathy of left rotator cuff (L89.614); Impingement syndrome of left shoulder (M75.42)  Treatment Diagnosis: Decreased ADL status (T54.5)    Insurance/Certification information:  PT Insurance Information: [address] Nu[address]ez 150  Physician Information:  Dr. Vero Wolff of care signed (Y/N): N    Date of Patient follow up with Physician:      Progress Report: []  Yes  [x]  No     Date Range for reporting period:  Beginnin2022  Ending:     Progress report due (10 Rx/or 30 days whichever is less):      Recertification due (POC duration/ or 90 days whichever is less):      Visit # Insurance/POC Allowable Auth Needed   1 8 []Yes    [x]No     Functional Outcomes Measure:   Test:FOTO Shoulder = 75      Pain level:  1/10     History of Injury:Patient stated she has \"issues\" at the top of the L shoulder. Pt had cortisone shot which \"helped a lot, I have a little bit of pain every once in a while. \"  Prior to the cortisone shot pt had a constant ache rated 7/10 and the top of the L lateral humerus had pain and the lateral L forearm \"had tingling. \" Pt had pain with horizontal adduction when moving the covers on her bed. Pt stated she has massages once a month which helped to relieve pain \"for a couple days\". Pt used to see a chiropractor for 30 years for neck related issues.   Pt feels like stress at her former job for 24 years contributed to her chronic neck muscular tightness and hold  Corner Pec Major Stretch - 1 x daily - 7 x weekly - 3 reps - 15 hold  Prone Scapular Slide with Shoulder Extension - 1 x daily - 7 x weekly - 10 reps - 5 hold  Prone Scapular Retraction Arms at Side - 1 x daily - 7 x weekly - 10 reps - 5 hold  Supine Chin Tuck - 1 x daily - 7 x weekly - 10 reps - 5 hold    Therapeutic Exercise and NMR EXR  [] (49432) Provided verbal/tactile cueing for activities related to strengthening, flexibility, endurance, ROM  for improvements in cervical, postural, scapular, scapulothoracic and UE control with self care, reaching, carrying, lifting, house/yardwork, driving/computer work.    [] (10829) Provided verbal/tactile cueing for activities related to improving balance, coordination, kinesthetic sense, posture, motor skill, proprioception  to assist with cervical, scapular, scapulothoracic and UE control with self care, reaching, carrying, lifting, house/yardwork, driving/computer work. Therapeutic Activities:    [] (73886 or 39154) Provided verbal/tactile cueing for activities related to improving balance, coordination, kinesthetic sense, posture, motor skill, proprioception and motor activation to allow for proper function of cervical, scapular, scapulothoracic and UE control with self care, carrying, lifting, driving/computer work.      Home Exercise Program:    [] (17280) Reviewed/Progressed HEP activities related to strengthening, flexibility, endurance, ROM of cervical, scapular, scapulothoracic and UE control with self care, reaching, carrying, lifting, house/yardwork, driving/computer work  [] (00889) Reviewed/Progressed HEP activities related to improving balance, coordination, kinesthetic sense, posture, motor skill, proprioception of cervical, scapular, scapulothoracic and UE control with self care, reaching, carrying, lifting, house/yardwork, driving/computer work      Manual Treatments:  PROM / STM / Oscillations-Mobs:  G-I, II, III, IV (PA's, Inf., Post.)  []

## 2022-08-09 ENCOUNTER — HOSPITAL ENCOUNTER (OUTPATIENT)
Dept: PHYSICAL THERAPY | Age: 62
Setting detail: THERAPIES SERIES
Discharge: HOME | End: 2022-08-09
Payer: COMMERCIAL

## 2022-08-09 PROCEDURE — 97140 MANUAL THERAPY 1/> REGIONS: CPT

## 2022-08-09 PROCEDURE — 97112 NEUROMUSCULAR REEDUCATION: CPT

## 2022-08-09 PROCEDURE — 97110 THERAPEUTIC EXERCISES: CPT

## 2022-08-09 NOTE — FLOWSHEET NOTE
[facility] [address] [address]  Phone: [phone]   Fax:     [phone]    Physical Therapy Treatment Note/ Progress Report:     Date:  2022    Patient Name:  Alannah Oneal   \"Lakshmi\"  :  1960  MRN: [id number]    Pertinent Medical History:      Medical/Treatment Diagnosis Information:  Diagnosis: Neck pain (M54.2); Left shoulder pain, unspecified chronicity (M25.512); Degenerative disc disease, cervical (M50.30); Tendinopathy of left rotator cuff (C29.729); Impingement syndrome of left shoulder (M75.42)  Treatment Diagnosis: Decreased ADL status (V21.7)    Insurance/Certification information:  PT Insurance Information: Delmy Friedman  Physician Information:  Dr. Christie Dose of care signed (Y/N): Y    Date of Patient follow up with Physician:      Progress Report: []  Yes  [x]  No     Date Range for reporting period:  Beginnin2022  Ending:     Progress report due (10 Rx/or 30 days whichever is less):      Recertification due (POC duration/ or 90 days whichever is less):      Visit # Insurance/POC Allowable Auth Needed   2 8 []Yes    [x]No     Functional Outcomes Measure:   Test:FOTO Shoulder = 75      Pain level:  1/10     History of Injury:Patient stated she has \"issues\" at the top of the L shoulder. Pt had cortisone shot which \"helped a lot, I have a little bit of pain every once in a while. \"  Prior to the cortisone shot pt had a constant ache rated 7/10 and the top of the L lateral humerus had pain and the lateral L forearm \"had tingling. \" Pt had pain with horizontal adduction when moving the covers on her bed. Pt stated she has massages once a month which helped to relieve pain \"for a couple days\". Pt used to see a chiropractor for 30 years for neck related issues.   Pt feels like stress at her former job for 24 years contributed to her chronic neck muscular tightness scheduling future physical therapy appointments. Time was also taken on this day to answer all patient questions and participation in PT. Reviewed appointment policy in detail with patient and patient verbalized understanding. Home Exercise Program:  Access Code: [email or web address]  URL: [email or web address]  Date: 08/02/2022  Prepared by: Sandra Worthy     Exercises  Standing Scapular Retraction - 1 x daily - 7 x weekly - 10 reps - 5 hold  Standing Shoulder Shrugs - 1 x daily - 7 x weekly - 10 reps - 5 hold  Corner Pec Major Stretch - 1 x daily - 7 x weekly - 3 reps - 15 hold  Prone Scapular Slide with Shoulder Extension - 1 x daily - 7 x weekly - 10 reps - 5 hold  Prone Scapular Retraction Arms at Side - 1 x daily - 7 x weekly - 10 reps - 5 hold  Supine Chin Tuck - 1 x daily - 7 x weekly - 10 reps - 5 hold    Therapeutic Exercise and NMR EXR  [] (09570) Provided verbal/tactile cueing for activities related to strengthening, flexibility, endurance, ROM  for improvements in cervical, postural, scapular, scapulothoracic and UE control with self care, reaching, carrying, lifting, house/yardwork, driving/computer work.    [] (98561) Provided verbal/tactile cueing for activities related to improving balance, coordination, kinesthetic sense, posture, motor skill, proprioception  to assist with cervical, scapular, scapulothoracic and UE control with self care, reaching, carrying, lifting, house/yardwork, driving/computer work. Therapeutic Activities:    [] (22263 or 66747) Provided verbal/tactile cueing for activities related to improving balance, coordination, kinesthetic sense, posture, motor skill, proprioception and motor activation to allow for proper function of cervical, scapular, scapulothoracic and UE control with self care, carrying, lifting, driving/computer work.      Home Exercise Program:    [] (40930) Reviewed/Progressed HEP activities related to strengthening, flexibility, endurance, ROM of cervical, scapular, scapulothoracic and UE control with self care, reaching, carrying, lifting, house/yardwork, driving/computer work  [] (50486) Reviewed/Progressed HEP activities related to improving balance, coordination, kinesthetic sense, posture, motor skill, proprioception of cervical, scapular, scapulothoracic and UE control with self care, reaching, carrying, lifting, house/yardwork, driving/computer work      Manual Treatments:  PROM / STM / Oscillations-Mobs:  G-I, II, III, IV (PA's, Inf., Post.)  [] (91771) Provided manual therapy to mobilize soft tissue/joints of cervical/CT, scapular GHJ and UE for the purpose of decreasing headache, modulating pain, promoting relaxation,  increasing ROM, reducing/eliminating soft tissue swelling/inflammation/restriction, improving soft tissue extensibility and allowing for proper ROM for normal function with self care, reaching, carrying, lifting, house/yardwork, driving/computer work      Charges:  Timed Code Treatment Minutes: 25   Total Treatment Minutes: 45     [] EVAL (LOW) 42309 (typically 20 minutes face-to-face)  [] EVAL (MOD) 13061 (typically 30 minutes face-to-face)  [] EVAL (HIGH) 36423 (typically 45 minutes face-to-face)  [] RE-EVAL     [x] NG(24184) x     [] Dry needle 1 or 2 Muscles (78861)  [x] NMR (37170) x     [] Dry needle 3+ Muscles (91872)  [x] Manual (01515) x     [] Ultrasound (02489) x  [] TA (22246) x     [] Mech Traction (02282)  [] ES(attended) (24107)     [] ES (un) (49583):   [] Vasopump (16300) [] Ionto (63648)   [] Other:    GOALS:  Patient stated goal: \"no pain when the shot wears off\"  [] Progressing: [] Met: [] Not Met: [] Adjusted     Therapist goals for Patient:  Short Term Goals: To be achieved in: 2 weeks  1. Independent in HEP and progression per patient tolerance, in order to prevent re-injury. [] Progressing: [] Met: [] Not Met: [] Adjusted  2.  Patient will have a decrease in pain to facilitate improvement in movement, function, and ADLs as indicated by Functional Deficits. [] Progressing: [] Met: [] Not Met: [] Adjusted     Long Term Goals: To be achieved in: 4 weeks  1. FOTO Shoulder will score 77 to assist with reaching prior level of function. [] Progressing: [] Met: [] Not Met: [] Adjusted  2. Patient will demonstrate increased AROM to [facility] of cervical/thoracic spine to allow for proper joint functioning as indicated by patients Functional Deficits. [] Progressing: [] Met: [] Not Met: [] Adjusted  3. Patient will demonstrate an increase in postural awareness and control and activation of  Deep cervical stabilizers to allow for proper functional mobility as indicated by patients Functional Deficits. [] Progressing: [] Met: [] Not Met: [] Adjusted  4. Patient will return working a full shift without increased symptoms or restriction. [] Progressing: [] Met: [] Not Met: [] Adjusted  5. Patient will be able to use her cell phone without aggravating pain. [] Progressing: [] Met: [] Not Met: [] Adjusted           ASSESSMENT:  cervical spine AROM and L shoulder flexion AROM improved following PT today    Treatment/Activity Tolerance:  [x] Patient tolerated treatment well [] Patient limited by fatique  [] Patient limited by pain  [] Patient limited by other medical complications  [] Other:     Overall Progression Towards Functional goals/ Treatment Progress Update:  [] Patient is progressing as expected towards functional goals listed. [] Progression is slowed due to complexities/Impairments listed. [] Progression has been slowed due to co-morbidities.   [x] Plan just implemented, too soon to assess goals progression <30days   [] Goals require adjustment due to lack of progress  [] Patient is not progressing as expected and requires additional follow up with physician  [] Other    Prognosis for POC: [x] Good [] Fair  [] Poor    Patient requires continued skilled intervention: [x] Yes  [] No        PLAN: next session begin for HEP 3 finger sweep, chin tuck with neck flexion, t-band rows  [x] Continue per plan of care [] Alter current plan (see comments)  [] Plan of care initiated [] Hold pending MD visit [] Discharge    Electronically signed by: Kyree Jones PT , OMT-C, [id number]      Note: If patient does not return for scheduled/recommended follow up visits, this note will serve as a discharge from care along with the most recent update on progress.

## 2022-08-16 ENCOUNTER — OFFICE VISIT (OUTPATIENT)
Dept: ORTHOPEDIC SURGERY | Age: 62
End: 2022-08-16
Payer: COMMERCIAL

## 2022-08-16 ENCOUNTER — HOSPITAL ENCOUNTER (OUTPATIENT)
Dept: PHYSICAL THERAPY | Age: 62
Setting detail: THERAPIES SERIES
Discharge: HOME | End: 2022-08-16
Payer: COMMERCIAL

## 2022-08-16 VITALS — BODY MASS INDEX: 29.27 KG/M2 | WEIGHT: 155 LBS | HEIGHT: 61 IN

## 2022-08-16 DIAGNOSIS — M75.42 IMPINGEMENT SYNDROME OF LEFT SHOULDER: ICD-10-CM

## 2022-08-16 DIAGNOSIS — M67.912 TENDINOPATHY OF LEFT ROTATOR CUFF: ICD-10-CM

## 2022-08-16 DIAGNOSIS — M50.30 DEGENERATIVE DISC DISEASE, CERVICAL: ICD-10-CM

## 2022-08-16 DIAGNOSIS — M25.512 LEFT SHOULDER PAIN, UNSPECIFIED CHRONICITY: Primary | ICD-10-CM

## 2022-08-16 DIAGNOSIS — M54.2 NECK PAIN: ICD-10-CM

## 2022-08-16 PROCEDURE — 99213 OFFICE O/P EST LOW 20 MIN: CPT | Performed by: FAMILY MEDICINE

## 2022-08-16 PROCEDURE — 97110 THERAPEUTIC EXERCISES: CPT

## 2022-08-16 PROCEDURE — 20561 NDL INSJ W/O NJX 3+ MUSC: CPT

## 2022-08-16 PROCEDURE — 97112 NEUROMUSCULAR REEDUCATION: CPT

## 2022-08-16 PROCEDURE — 97124 MASSAGE THERAPY: CPT

## 2022-08-16 NOTE — FLOWSHEET NOTE
(H60.608); Impingement syndrome of left shoulder (M75.42)  Treatment Diagnosis: Decreased ADL status (H46.7)    Insurance/Certification information:  PT Insurance Information: Navi Perez  Physician Information:  Dr. Lopez [facility] of care signed (Y/N): Y    Date of Patient follow up with Physician:      Progress Report: []  Yes  [x]  No     Date Range for reporting period:  Beginnin2022  Ending:     Progress report due (10 Rx/or 30 days whichever is less):      Recertification due (POC duration/ or 90 days whichever is less):      Visit # Insurance/POC Allowable Auth Needed   3 8 []Yes    [x]No     Functional Outcomes Measure:   Test:FOTO Shoulder = 75      Pain level:  1/10     History of Injury:Patient stated she has \"issues\" at the top of the L shoulder. Pt had cortisone shot which \"helped a lot, I have a little bit of pain every once in a while. \"  Prior to the cortisone shot pt had a constant ache rated 7/10 and the top of the L lateral humerus had pain and the lateral L forearm \"had tingling. \" Pt had pain with horizontal adduction when moving the covers on her bed. Pt stated she has massages once a month which helped to relieve pain \"for a couple days\". Pt used to see a chiropractor for 30 years for neck related issues. Pt feels like stress at her former job for 24 years contributed to her chronic neck muscular tightness and neck pain. SUBJECTIVE:  See eval  22 pt stated that she felt like she was not making any improvements, but \"its hurting in a different spot\". Pt stated she has sensation of tension along the L upper trap and L biceps region \"like a catch when I go to do certain things\" and demonstrated reaching overhead. Pain is rated 3/10. Pt no longer has difficulty moving covers on her bed (hor adduction).     OBJECTIVE:   Observation:   Test measurements:      AROM Cervical Spine 22   Flexion 35 50   Extension 45, c/o \"tight\" lower cervical 45, no c/o   SB L 18    SB R 22 Rot L 50 50   Rot R 70 75                               UE AROM Left  8/2/22 Right  8/2/22 L  8/16/22 R   8/16/22   Shoulder Flex 175 183 185 WFL   Shoulder Abd 175 185 185 WFL   UE Strength Left Right     Shoulder Flex 4/5 5/5 5/5 NT   Shoulder ABd (C5 Axillary) 4+/5 5/5 4+/5 NT   Shoulder ER  4/5 5/5 5/5 NT   Scapular strength         Lats 5/5 5/5     Mid traps 4-/5 4/5     Low traps 3+/5 4-/5       RESTRICTIONS/PRECAUTIONS: none    Exercises/Interventions:   Therapeutic Ex (03019)  Min: Resistance/Repetitions Notes   Chin tuck 10x    Isometric cervical SB L and R 10x    Chin tuck with neck flexion   10x    Cervical rotation, supine 10x    Supine shoulder horizontal ABD/ADD 20x    3 finger sweep cervical spine rotation 10x              Manual Intervention (22231)  Min: 10     Cerv mobs/manip Assess for L cervical rotation              Rib mobilizations L first and second rib mobs grade IV and V    STM L scalenes check    Contract/relax stretch   L scalenes  L levator scapula 4x, 15\" hold each    DTM L pec minor 5'    NMR re-education (94036)  Min:     L scapular AAROM  PNF D1  PNF D2   15x  15x    Serratus punches L  L hor abd/add 10x, 5\" hold  20x    Roll ball on table L/R  Flexion  Scaption   20x each  20x each    White L  Flexion  Hor abd/add   20x  20x    T-slide  Rows  Lat pulls   Green 20x  Green 20x    Therapeutic Activity (43479)  Min:               Modalities  Min:                  Other Therapeutic Activities:  Pt was educated on PT POC, Diagnosis, Prognosis, pathomechanics as well as frequency and duration of scheduling future physical therapy appointments. Time was also taken on this day to answer all patient questions and participation in PT. Reviewed appointment policy in detail with patient and patient verbalized understanding.      Spoke with Dr. Demarco Lombardi  regarding the use of Dry Needling     Dry needling manual therapy: consisted on the placement of 3 needles in the following muscles:  L upper trap, L levator scapula, L infraspinatus. A 40-60 mm needle was inserted, piston, rotated, and coned to produce intramuscular mobilization. These techniques were used to restore functional range of motion, reduce muscle spasm and induce healing in the corresponding musculature. (24766)  Clean Technique was utilized today while applying Dry needling treatment. The treatment sites where cleaned with 70% solution of  isopropyl alcohol . The PT washed their hands and utilized treatment gloves along with hand  prior to inserting the needles. All needles where removed and discarded in the appropriate sharps container. MD has given verbal and/or written approval for this treatment. Home Exercise Program:  Access Code: [email or web address]  URL: [email or web address]  Date: 08/02/2022  Prepared by: Nuris Mckeoning     Exercises  Standing Scapular Retraction - 1 x daily - 7 x weekly - 10 reps - 5 hold  Standing Shoulder Shrugs - 1 x daily - 7 x weekly - 10 reps - 5 hold  Corner Pec Major Stretch - 1 x daily - 7 x weekly - 3 reps - 15 hold  Prone Scapular Slide with Shoulder Extension - 1 x daily - 7 x weekly - 10 reps - 5 hold  Prone Scapular Retraction Arms at Side - 1 x daily - 7 x weekly - 10 reps - 5 hold  Supine Chin Tuck - 1 x daily - 7 x weekly - 10 reps - 5 hold    Therapeutic Exercise and NMR EXR  [] (25185) Provided verbal/tactile cueing for activities related to strengthening, flexibility, endurance, ROM  for improvements in cervical, postural, scapular, scapulothoracic and UE control with self care, reaching, carrying, lifting, house/yardwork, driving/computer work.    [] (18558) Provided verbal/tactile cueing for activities related to improving balance, coordination, kinesthetic sense, posture, motor skill, proprioception  to assist with cervical, scapular, scapulothoracic and UE control with self care, reaching, carrying, lifting, house/yardwork, driving/computer work.     Therapeutic Activities: [] (42640 or 15758) Provided verbal/tactile cueing for activities related to improving balance, coordination, kinesthetic sense, posture, motor skill, proprioception and motor activation to allow for proper function of cervical, scapular, scapulothoracic and UE control with self care, carrying, lifting, driving/computer work.      Home Exercise Program:    [] (17933) Reviewed/Progressed HEP activities related to strengthening, flexibility, endurance, ROM of cervical, scapular, scapulothoracic and UE control with self care, reaching, carrying, lifting, house/yardwork, driving/computer work  [] (15213) Reviewed/Progressed HEP activities related to improving balance, coordination, kinesthetic sense, posture, motor skill, proprioception of cervical, scapular, scapulothoracic and UE control with self care, reaching, carrying, lifting, house/yardwork, driving/computer work      Manual Treatments:  PROM / STM / Oscillations-Mobs:  G-I, II, III, IV (PA's, Inf., Post.)  [] (38829) Provided manual therapy to mobilize soft tissue/joints of cervical/CT, scapular GHJ and UE for the purpose of decreasing headache, modulating pain, promoting relaxation,  increasing ROM, reducing/eliminating soft tissue swelling/inflammation/restriction, improving soft tissue extensibility and allowing for proper ROM for normal function with self care, reaching, carrying, lifting, house/yardwork, driving/computer work      Charges:  Timed Code Treatment Minutes: 55   Total Treatment Minutes: 55     [] EVAL (LOW) 76707 (typically 20 minutes face-to-face)  [] EVAL (MOD) 65345 (typically 30 minutes face-to-face)  [] EVAL (HIGH) 07102 (typically 45 minutes face-to-face)  [] RE-EVAL     [x] HJ(64378) x     [] Dry needle 1 or 2 Muscles (17721)  [x] NMR (18269) x     [x] Dry needle 3+ Muscles (79355)  [x] Manual (96673) x     [] Ultrasound (35084) x  [] TA (60206) x     [] Mech Traction (18910)  [] ES(attended) (06896)     [] ES (un) (56291):   [] Vasopump (62215) [] Ionto (72894)   [] Other:    GOALS:  Patient stated goal: \"no pain when the shot wears off\"  [] Progressing: [] Met: [] Not Met: [] Adjusted     Therapist goals for Patient:  Short Term Goals: To be achieved in: 2 weeks  1. Independent in HEP and progression per patient tolerance, in order to prevent re-injury. [] Progressing: [] Met: [] Not Met: [] Adjusted  2. Patient will have a decrease in pain to facilitate improvement in movement, function, and ADLs as indicated by Functional Deficits. [] Progressing: [] Met: [] Not Met: [] Adjusted     Long Term Goals: To be achieved in: 4 weeks  1. FOTO Shoulder will score 77 to assist with reaching prior level of function. [] Progressing: [] Met: [] Not Met: [] Adjusted  2. Patient will demonstrate increased AROM to [facility] of cervical/thoracic spine to allow for proper joint functioning as indicated by patients Functional Deficits. [] Progressing: [] Met: [] Not Met: [] Adjusted  3. Patient will demonstrate an increase in postural awareness and control and activation of  Deep cervical stabilizers to allow for proper functional mobility as indicated by patients Functional Deficits. [] Progressing: [] Met: [] Not Met: [] Adjusted  4. Patient will return working a full shift without increased symptoms or restriction. [] Progressing: [] Met: [] Not Met: [] Adjusted  5. Patient will be able to use her cell phone without aggravating pain. [] Progressing: [] Met: [] Not Met: [] Adjusted           ASSESSMENT:  pt had report of light-headedness following DN. Pt had LTR at L infraspinatus and levator scapular    Treatment/Activity Tolerance:  [x] Patient tolerated treatment well [] Patient limited by fatique  [] Patient limited by pain  [] Patient limited by other medical complications  [] Other:     Overall Progression Towards Functional goals/ Treatment Progress Update:  [] Patient is progressing as expected towards functional goals listed.     [] Progression is slowed due to complexities/Impairments listed. [] Progression has been slowed due to co-morbidities. [x] Plan just implemented, too soon to assess goals progression <30days   [] Goals require adjustment due to lack of progress  [] Patient is not progressing as expected and requires additional follow up with physician  [] Other    Prognosis for POC: [x] Good [] Fair  [] Poor    Patient requires continued skilled intervention: [x] Yes  [] No        PLAN: next session begin for HEP 3 finger sweep, chin tuck with neck flexion, t-band rows  [x] Continue per plan of care [] Alter current plan (see comments)  [] Plan of care initiated [] Hold pending MD visit [] Discharge    Electronically signed by: Coretta Steele PT , OMT-C, [id number]      Note: If patient does not return for scheduled/recommended follow up visits, this note will serve as a discharge from care along with the most recent update on progress.

## 2022-08-16 NOTE — PROGRESS NOTES
Chief Complaint  Shoulder Pain (F/u right shoulder)      FU new onset left shoulder pain with mild weakness and history of chronic cervical pain with myofascial pain    History of Present Illness:  Kishan Dodd is a 60 y.o. female who is a very pleasant right-hand-dominant white female who is a  who is currently laid off and is a very nice patient of Dr. Onnie Castleman who is being seen today in kind consultation from Tabitha Puckett CNP for evaluation of chronic cervical myofascial pain but newer onset of pain to her left shoulder. She states she has a longstanding history of tightness to the cervical spine and typically does get massages 1 time per month and this has been a longstanding issue. She did have an initial evaluation with imaging in summer 2021 where x-rays did reveal degenerative disc changes and loss of cervical lordosis at C5-6 but at that point was not really complaining of associated shoulder or arm discomfort. There is no history of injury or no activity prior to becoming symptomatic and has occasionally taken Advil and Aleve for this and once again does get her massage treatments 1 time per month. She does have a newer complaint that of soreness and stiffness to her shoulder and into the upper arm since roughly April 2022. Once again there is no history of injury no activity prior to coming symptomatic although she does have a chronic achy type sensation over the greater tuberosity extending to the upper arm. This is once again failed to respond to her massage therapy and Advil over-the-counter prompting today's consultation after seeing Tabitha Puckett CNP on 7/7/2022. We initially saw Rhett Jhonathan in the office on 7/19/2022 who is a  who does have a known history of longstanding cervical myofascial pain and was found on work-up to have underlying cervical degenerative disc disease as well as left shoulder pain with suspected cuff tendinopathy and impingement. With initial conservative treatment, she is doing outstanding today and is 90% improved with regard to her cervical spine and her shoulder. She is gotten stronger with a couple of sessions of therapy and does have 1 additional one scheduled for next week and has been diligent with her home-based exercises and believe her motion and strength is improved substantially. She was unable to tolerate her anti-inflammatories due to flushing and did have to stop these. She does have some crepitation and popping but is no longer having substantial pain and is denying true radicular symptoms. Denies locking or catching she is very pleased with her progress. Medical History  Patient's medications, allergies, past medical, surgical, social and family histories were reviewed and updated as appropriate. Review of Systems  Pertinent items are noted in HPI  Review of systems reviewed from Patient History Form dated on 7/19/2022 and available in the patient's chart under the Media tab. Vital Signs  There were no vitals filed for this visit. General Exam:     Constitutional: Patient is adequately groomed with no evidence of malnutrition  DTRs: Deep tendon reflexes are intact  Mental Status: The patient is oriented to time, place and person. The patient's mood and affect are appropriate. Lymphatic: The lymphatic examination bilaterally reveals all areas to be without enlargement or induration. Vascular: Examination reveals no swelling or calf tenderness. Peripheral pulses are palpable and 2+. Neurological: The patient has good coordination. There is no weakness or sensory deficit. Cervical spine Examination  Inspection: She no longer has significant lower cervical paraspinal myofascial tenderness with extension to the left greater than right trapezius without periscapular discomfort. No high-grade deformity or palpable spasm. No swelling.       Palpation: She longer exhibits tenderness over the lower cervical paraspinals with several trapezial trigger points. Rang of Motion: She does have very reasonable cervical motion without high-grade discomfort with axial load testing. Strength: There is intact cervical isometric strength and there is not appear to be focal upper extremity motor deficits related to her cervical spine. Special Tests: She no longer demonstrates any pain with axial load testing and facet loading but does not radiate pain to the upper extremities. Spurling's and Miguel's do appear to be negative today. Skin: There are no rashes, ulcerations or lesions. Distal motor sensory and vascular exams intact. Gait: Fluid smooth gait    Reflexes:  Symmetrically preserved     Additional Comments: Evaluation of her left shoulder does reveal reasonable shoulder motion with less stiffness in the terminal 10 to 20 degrees abduction and forward flexion. She does have regulation of her tenderness over the greater tuberosity mildly over the posterior cuff without substantial discomfort over the [facility] joint and biceps tendon. She does have improving weakness and 4 out of 5 with supra and infraspinatus testing. Subscap 5 out of 5. She has negative drop and lift off testing without evidence of high-grade instability. She has pain at 0 out of 10 pain with impingement testing     Additional Examinations:  Contralateral Exam: Examination of the right shoulder reveals no atrophy or deformity. The skin is warm and dry. Range of motion is within normal limits. There is no focal tenderness with palpation. No AC joint tenderness. Negative Neer's and Nicole-Toñito exams. Strength is graded 5/5 throughout. Right Upper Extremity:  Examination of the right upper extremity does not show any tenderness, deformity or injury. Range of motion is unremarkable. There is no gross instability. There are no rashes, ulcerations or lesions.   Strength and tone are normal.  Left Upper Extremity: Examination of the left upper extremity does not show any tenderness, deformity or injury. Range of motion is unremarkable. There is no gross instability. There are no rashes, ulcerations or lesions. Strength and tone are normal.      Diagnostic Test Findings: Left shoulder true AP outlet and axillary films were reviewed from 7/19/2022 and does show moderate generative changes of the [facility] [place] by [facility] joint without evidence of acute osseous injury. AP and lateral cervical spine films were obtained today and does show loss of cervical lordosis with degenerative disc change at C5-6 and mild facet arthropathy without high-grade spondylolisthesis or evidence of acute osseous injury. Assessment: #1.  4+ month status post symptomatically improved left shoulder pain with suspected cuff tendinopathy with impingement and mild AC arthropathy. 2.  Improved chronic cervical myofascial pain with underlying C5-6 cervical degenerative disc disease without current evidence of high-grade radiculopathy    Impression:  Encounter Diagnoses   Name Primary? Left shoulder pain, unspecified chronicity Yes    Neck pain     Degenerative disc disease, cervical     Tendinopathy of left rotator cuff     Impingement syndrome of left shoulder          Office Procedures:  No orders of the defined types were placed in this encounter. Treatment Plan:  Treatment options were discussed with Silas Ingram. We did once again review her current plain films and exam findings. She has had longstanding cervical myofascial pain and does have underlying cervical degenerative disc changes but I am not highly suspicious of a high-grade radiculopathy currently as I still think the majority of her symptoms are likely related to her left shoulder. With initial treatment over the last month, her symptoms have improved 90% both regard to her cervical spine and her shoulder.   She is no longer having any pain at rest or night although she does have some clicking this is no longer painful in her shoulder and is improving with regard to her motion and strength as well. She was not able to tolerate anti-inflammatories due to flushing and does have a history of irritable bowel. Since she is not having pain, I think she will do quite well as long as she is diligent on completing her supervised therapy and performing her home exercise program continuously. Icing and activity modification was recommended with her improvement I think we can see her back as needed but she will contact us with questions or concerns in the interim. CC: Dr. Salbador James and Camilla Palma CNP      This dictation was performed with a verbal recognition program [facility]) and it was checked for errors. It is possible that there are still dictated errors within this office note. If so, please bring any errors to my attention for an addendum. All efforts were made to ensure that this office note is accurate.

## 2022-08-23 ENCOUNTER — HOSPITAL ENCOUNTER (OUTPATIENT)
Dept: PHYSICAL THERAPY | Age: 62
Setting detail: THERAPIES SERIES
Discharge: HOME | End: 2022-08-23
Payer: COMMERCIAL

## 2022-08-23 PROCEDURE — 97140 MANUAL THERAPY 1/> REGIONS: CPT

## 2022-08-23 PROCEDURE — 97110 THERAPEUTIC EXERCISES: CPT

## 2022-08-23 PROCEDURE — 97112 NEUROMUSCULAR REEDUCATION: CPT

## 2022-08-23 NOTE — FLOWSHEET NOTE
neck muscular tightness and neck pain. SUBJECTIVE:  See eval  8/16/22 pt stated that she felt like she was not making any improvements, but \"its hurting in a different spot\". Pt stated she has sensation of tension along the L upper trap and L biceps region \"like a catch when I go to do certain things\" and demonstrated reaching overhead. Pain is rated 3/10. Pt no longer has difficulty moving covers on her bed (hor adduction). 8/23/22 pt has a catch sensation at the L anterior shoulder, but overall the pain is improving. Pt stated she was released by Dr. Adina Avila. Pt stated she had no questions regarding HEP.     OBJECTIVE:   Observation:   Test measurements:      AROM Cervical Spine 8/2/22 8/16/22 8/23/22   Flexion 35 50    Extension 45, c/o \"tight\" lower cervical 45, no c/o    SB L 18     SB R 22     Rot L 50 50 65   Rot R 70 75                                   UE AROM Left  8/2/22 Right  8/2/22 L  8/16/22 R   8/16/22   Shoulder Flex 175 183 185 WFL   Shoulder Abd 175 185 185 WFL   UE Strength Left Right     Shoulder Flex 4/5 5/5 5/5 NT   Shoulder ABd (C5 Axillary) 4+/5 5/5 4+/5 NT   Shoulder ER  4/5 5/5 5/5 NT   Scapular strength         Lats 5/5 5/5     Mid traps 4-/5 4/5     Low traps 3+/5 4-/5       RESTRICTIONS/PRECAUTIONS: none    Exercises/Interventions:   Therapeutic Ex (25487)  Min:10 Resistance/Repetitions Notes   Chin tuck 10x         Chin tuck with neck flexion   10x         Supine on bolster  Shoulder flexion horizontal ABD/ADD   20x  20x    3 finger sweep cervical spine rotation 10x              Manual Intervention (25905)  Min: 20     Cerv mobs/manip Assess for L cervical rotation              Rib mobilizations L first and second rib mobs grade IV and V    STM L pec major/minor check    Contract/relax stretch   L scalenes  L levator scapula  L serratus ant  L pec 4x, 15\" hold each         NMR re-education (16536)  Min:15          Serratus punches L  L hor abd/add 10x, 5\" hold  20x [place] L  Flexion  Hor abd/add   20x  20x    T-slide  Rows  Lat pulls  Hor ABD (reverse butterfly)   Green 20x  Green 20x  Pink, 2x10    Therapeutic Activity (10054)  Min:               Modalities  Min:                  Other Therapeutic Activities:  Pt was educated on PT POC, Diagnosis, Prognosis, pathomechanics as well as frequency and duration of scheduling future physical therapy appointments. Time was also taken on this day to answer all patient questions and participation in PT. Reviewed appointment policy in detail with patient and patient verbalized understanding. Spoke with Dr. Rebeka Gary  regarding the use of Dry Needling           Home Exercise Program:    Access Code: [email or web address]  URL: "Hey, [email or web address]  Date: 08/23/2022  Prepared by: Baljinder Forbes    Exercises  Standing Scapular Retraction - 1 x daily - 7 x weekly - 10 reps - 5 hold  Standing Shoulder Shrugs - 1 x daily - 7 x weekly - 10 reps - 5 hold  Corner Pec Major Stretch - 1 x daily - 7 x weekly - 3 reps - 15 hold  Prone Scapular Slide with Shoulder Extension - 1 x daily - 7 x weekly - 10 reps - 5 hold  Prone Scapular Retraction Arms at Side - 1 x daily - 7 x weekly - 10 reps - 5 hold  Supine Chin Tuck - 1 x daily - 7 x weekly - 10 reps - 5 hold  Standing Bent Over Single Arm Scapular Row with Table Support - 1 x daily - 7 x weekly - 20 reps  Supine Scapular Protraction in Flexion with Dumbbells - 1 x daily - 7 x weekly - 10 reps - 5 hold  Standing Shoulder Horizontal Abduction with Resistance - 1 x daily - 7 x weekly - 2 sets - 10 reps      Therapeutic Exercise and NMR EXR  [] (27780) Provided verbal/tactile cueing for activities related to strengthening, flexibility, endurance, ROM  for improvements in cervical, postural, scapular, scapulothoracic and UE control with self care, reaching, carrying, lifting, house/yardwork, driving/computer work.    [] (79895) Provided verbal/tactile cueing for activities related to improving balance, [] Dry needle 1 or 2 Muscles (02111)  [x] NMR (13441) x     [] Dry needle 3+ Muscles (95023)  [x] Manual (21776) x     [] Ultrasound (68144) x  [] TA (21966) x     [] Mech Traction (44677)  [] ES(attended) (51526)     [] ES (un) (59354):   [] Vasopump (13141) [] Ionto (18912)   [] Other:    GOALS:  Patient stated goal: \"no pain when the shot wears off\"  [x] Progressing: [] Met: [] Not Met: [] Adjusted     Therapist goals for Patient:  Short Term Goals: To be achieved in: 2 weeks  1. Independent in HEP and progression per patient tolerance, in order to prevent re-injury. [] Progressing: [x] Met: [] Not Met: [] Adjusted  2. Patient will have a decrease in pain to facilitate improvement in movement, function, and ADLs as indicated by Functional Deficits. [] Progressing: [x] Met: [] Not Met: [] Adjusted     Long Term Goals: To be achieved in: 4 weeks  1. FOTO Shoulder will score 77 to assist with reaching prior level of function. [] Progressing: [] Met: [] Not Met: [] Adjusted  2. Patient will demonstrate increased AROM to [facility] of cervical/thoracic spine to allow for proper joint functioning as indicated by patients Functional Deficits. [] Progressing: [x] Met: [] Not Met: [] Adjusted  3. Patient will demonstrate an increase in postural awareness and control and activation of  Deep cervical stabilizers to allow for proper functional mobility as indicated by patients Functional Deficits. [] Progressing: [x] Met: [] Not Met: [] Adjusted  4. Patient will return working a full shift without increased symptoms or restriction. [x] Progressing: [] Met: [] Not Met: [] Adjusted  5. Patient will be able to use her cell phone without aggravating pain. [x] Progressing: [] Met: [] Not Met: [] Adjusted           ASSESSMENT:  improved L cervical spine ROM. FOTO measurements were not consistent with objective testing and with subjective comments.     Treatment/Activity Tolerance:  [x] Patient tolerated treatment well [] Patient limited by fatique  [] Patient limited by pain  [] Patient limited by other medical complications  [] Other:     Overall Progression Towards Functional goals/ Treatment Progress Update:  [] Patient is progressing as expected towards functional goals listed. [] Progression is slowed due to complexities/Impairments listed. [] Progression has been slowed due to co-morbidities. [x] Plan just implemented, too soon to assess goals progression <30days   [] Goals require adjustment due to lack of progress  [] Patient is not progressing as expected and requires additional follow up with physician  [] Other    Prognosis for POC: [x] Good [] Fair  [] Poor    Patient requires continued skilled intervention: [x] Yes  [] No        PLAN: today may have been the last treatment for pt. PT will hold chart open for 3 weeks. If PT does not hear from pt by 9/13/22 then DC. [x] Continue per plan of care [] Alter current plan (see comments)  [] Plan of care initiated [] Hold pending MD visit [] Discharge    Electronically signed by: Joshua Ellis PT , OMT-C, [id number]      Note: If patient does not return for scheduled/recommended follow up visits, this note will serve as a discharge from care along with the most recent update on progress.

## 2022-08-30 ENCOUNTER — TELEPHONE (OUTPATIENT)
Dept: FAMILY MEDICINE CLINIC | Age: 62
End: 2022-08-30

## 2022-08-30 NOTE — LETTER
[facility]  [address] [place] [address]  Phone: [phone]  Fax: [phone]    Regis Barrow MD        2022     ATTN: [id number]   [place] [address]  FAX: [phone]    RE: Mikel Wells. Shawnleeanne Barbozamichael  : 1960  Member ID: [id number]    Denial for Brand Prevacid. My patient suffers from Gastro-esophageal reflux disease without esophagitis(GERD)and has tried and failed both formulary Proton-pump inhibitors. The omeprazole 20mg-did not help with symptoms and it caused constipation, knot in stomach, made heart burn worse. Pantoprazole 20mg-did not help alleviate symptoms. Lansoprazole 30mg-did not help alleviate symptoms. The patient has also tried   OTC Nexium-little relief, Zantac-no relief and tums-no relief. My patient has been well controlled with Brand PREVACID 30 MG delayed release capsule one daily since 2019. Please reconsider and approve coverage for PREVACID 30 MG delayed release capsule. If you have any questions or concerns, please don't hesitate to call.     Sincerely,        Regis Barrow MD

## 2022-08-30 NOTE — TELEPHONE ENCOUNTER
Pt stated that she is needing for Dr. MCKEON to send a letter to Cover My meds that she is needing Brand Name of Prevacid    Pl advise [phone] (home)

## 2022-08-31 RX ORDER — LANSOPRAZOLE 30 MG
30 CAPSULE,DELAYED RELEASE (ENTERIC COATED) ORAL DAILY
Qty: 90 CAPSULE | Refills: 3 | Status: CANCELLED | OUTPATIENT
Start: 2022-08-31

## 2022-08-31 NOTE — TELEPHONE ENCOUNTER
I started a PA for Brand Prevacid 30 mg dr capsules,   This request cannot be processed due to the medication is not covered by the plan its a plan exclusion. Preferred are Omeprazole and Pantoprazole which the pt has tried and did not help. Would need to do an appeal to see if they will cover.   FAX: [phone]  Grievances and Appeals  [id number]   [place] [address]      Pt tried omeprazole 20mg-did not help, constipation, knot in stomach, made heart burn worse  Pantoprazole 20mg-did not help  Lansoprazole 30mg-did not help  OTC Nexium-little relief  OTC Zantac-no relief  OTC tums-no relief     Symptoms controlled with brand Prevacid 30mg QD

## 2022-09-27 ENCOUNTER — TELEPHONE (OUTPATIENT)
Dept: FAMILY MEDICINE CLINIC | Age: 62
End: 2022-09-27

## 2022-09-27 NOTE — TELEPHONE ENCOUNTER
Patient is still fighting with her insurance company regarding the name brand Prevacid. See telephone note on 8/30/22. Now her insurance company is asking for documentation why she can not take the generic. She is asking if you can help her with this. She said her previous doctor helped her with this years ago and should be in her records that she had forwarded here.     Please call, [phone]

## 2022-09-27 NOTE — TELEPHONE ENCOUNTER
Lm for pt that when we received the denial for the Prevacid(brand) we did send a letter of medications you tried and failed to the appeals dept. I sent office visit notes and your egd report and we'll see what happens.  If they still deny brand prevacid will need to go thru her GI

## 2022-09-30 ENCOUNTER — TELEPHONE (OUTPATIENT)
Dept: FAMILY MEDICINE CLINIC | Age: 62
End: 2022-09-30

## 2022-09-30 NOTE — TELEPHONE ENCOUNTER
----- Message from April Nagle sent at 9/30/2022  3:35 PM EDT -----  Subject: Medication Problem    Medication: PREVACID 30 MG delayed release capsule  Dosage: Take 1 capsule by mouth daily  Ordering Provider: Rian White    Question/Problem: Earl Calvillo from patient's [employer] called to speak to the office   about a medical exception authorization for this medication. please call   Earl Calvillo back, thank you   Additional Information for Provider:     Pharmacy: [id number] - [id number] [address] [facility]   [phone] [facility] [phone]    ---------------------------------------------------------------------------  --------------  David HAGER  [phone]; OK to leave message on voicemail  ---------------------------------------------------------------------------  --------------    SCRIPT ANSWERS  Relationship to Patient: Third Party  Third Party Type: Insurance?    Representative Name: Deepak Holderllor

## 2023-01-24 ENCOUNTER — OFFICE VISIT (OUTPATIENT)
Dept: FAMILY MEDICINE CLINIC | Age: 63
End: 2023-01-24
Payer: COMMERCIAL

## 2023-01-24 VITALS
TEMPERATURE: 97.9 F | DIASTOLIC BLOOD PRESSURE: 80 MMHG | WEIGHT: 153.2 LBS | HEIGHT: 61 IN | HEART RATE: 80 BPM | BODY MASS INDEX: 28.92 KG/M2 | OXYGEN SATURATION: 98 % | SYSTOLIC BLOOD PRESSURE: 138 MMHG

## 2023-01-24 DIAGNOSIS — K21.9 GASTROESOPHAGEAL REFLUX DISEASE WITHOUT ESOPHAGITIS: ICD-10-CM

## 2023-01-24 DIAGNOSIS — R35.0 URINE FREQUENCY: Primary | ICD-10-CM

## 2023-01-24 DIAGNOSIS — Z13.1 DIABETES MELLITUS SCREENING: ICD-10-CM

## 2023-01-24 DIAGNOSIS — E78.2 MIXED HYPERLIPIDEMIA: ICD-10-CM

## 2023-01-24 DIAGNOSIS — N89.8 VAGINA ITCHING: ICD-10-CM

## 2023-01-24 LAB
BILIRUBIN, POC: NEGATIVE
BLOOD URINE, POC: NEGATIVE
CLARITY, POC: CLEAR
COLOR, POC: YELLOW
GLUCOSE URINE, POC: NEGATIVE
KETONES, POC: NEGATIVE
LEUKOCYTE EST, POC: NEGATIVE
NITRITE, POC: NEGATIVE
PH, POC: 7.5
PROTEIN, POC: NEGATIVE
SPECIFIC GRAVITY, POC: 1.02
UROBILINOGEN, POC: 0.2

## 2023-01-24 PROCEDURE — 99214 OFFICE O/P EST MOD 30 MIN: CPT | Performed by: NURSE PRACTITIONER

## 2023-01-24 PROCEDURE — 81002 URINALYSIS NONAUTO W/O SCOPE: CPT | Performed by: NURSE PRACTITIONER

## 2023-01-24 ASSESSMENT — ENCOUNTER SYMPTOMS
DIARRHEA: 0
VOMITING: 0
ABDOMINAL PAIN: 0
NAUSEA: 0
COUGH: 0

## 2023-01-24 ASSESSMENT — PATIENT HEALTH QUESTIONNAIRE - PHQ9
SUM OF ALL RESPONSES TO PHQ QUESTIONS 1-9: 0
1. LITTLE INTEREST OR PLEASURE IN DOING THINGS: 0
SUM OF ALL RESPONSES TO PHQ QUESTIONS 1-9: 0
SUM OF ALL RESPONSES TO PHQ QUESTIONS 1-9: 0
2. FEELING DOWN, DEPRESSED OR HOPELESS: 0
SUM OF ALL RESPONSES TO PHQ9 QUESTIONS 1 & 2: 0
SUM OF ALL RESPONSES TO PHQ QUESTIONS 1-9: 0

## 2023-01-24 NOTE — PROGRESS NOTES
Cora Conklin (:  1960) is a 60 y.o. female,Established patient, here for evaluation of the following chief complaint(s):  Urinary Frequency (For the past month she gets an itchiness/burning in her genital area and then urine frequency every couple of hours. Only happens at night. )         ASSESSMENT/PLAN:  1. Urine frequency  New. POCT negative. Discussed possible atrophic vaginitis, follow pt education included. - POCT Urinalysis no Micro    2. Vagina itching  New, external vaginal exam normal. Discussed possible atrophic vaginitis, follow pt education included. 3. Gastroesophageal reflux disease without esophagitis  Stable, repeat cbc  - CBC; Future    4. Mixed hyperlipidemia  Stable, pt is not interested in taking statins. Repeat labs  - Lipid, Fasting; Future  - Comprehensive Metabolic Panel; Future    5. Diabetes mellitus screening  Stable, needs repeat screen. - Hemoglobin A1C; Future     Return if symptoms worsen or fail to improve. Subjective   SUBJECTIVE/OBJECTIVE:  HPI  Presents to office today with c/o vag itching at night. States she wakes up at night feeling very itchy on the outside of vagina and has to urinate. No rectal complaints, denies vag discharge or rashes. States she is has not been sexually active in years. States her urine is darker, metalic smell at times. States she drinks plenty of water through out the day. Pt requested to have her labs ordered today for her annual physical. Denies additional concerns or complaints      Current Outpatient Medications   Medication Sig Dispense Refill    diclofenac (VOLTAREN) 75 MG EC tablet Take 1 tablet by mouth in the morning and 1 tablet before bedtime. 60 tablet 3    thyroid (ARMOUR THYROID) 120 MG tablet Take 1 tablet by mouth daily (Patient taking differently: Take 90 mg by mouth daily Pt taking a 90mg tablet.  Never been on 120mg) 30 tablet 3    valACYclovir (VALTREX) 500 MG tablet Take 1 tablet by mouth daily 90 tablet 1    PREVACID 30 MG delayed release capsule Take 1 capsule by mouth daily 90 capsule 3    Ascorbic Acid (VITAMIN C PO) Take 1 tablet by mouth daily      Probiotic Product (PROBIOTIC PO) Take 1 capsule by mouth daily      Hormone Cream Base (HRT CREAM BASE) CREA daily      polyethylene glycol (GLYCOLAX) packet Take 17 g by mouth daily as needed for Constipation      Cyanocobalamin (VITAMIN B-12) 2500 MCG SUBL Place 2,500 mcg under the tongue daily      Cholecalciferol (VITAMIN D3) 5000 units TABS Take 1 tablet by mouth daily       Omega-3 Fatty Acids (FISH OIL) 1000 MG CPDR Take 1,000 mg by mouth daily       NONFORMULARY ProGad Enhancer      NONFORMULARY L-5MTF-daily      NONFORMULARY Pro DHA 1000-daily      NONFORMULARY Take 1 tablet by mouth A-CYSTEINE/ARG ZN/GLUT/MV-MN (L GLUTAMINE-N ACET-ARG-VIT-MIN ORAL)-daily       No current facility-administered medications for this visit. Past Medical History:   Diagnosis Date    Anxiety     Cholestasis during pregnancy     cholestasis of pregnancy over 25 years ago     Chronic constipation     Chronic lymphocytic thyroiditis     COVID-19 12/2021    GERD (gastroesophageal reflux disease)     Gluten intolerance     Hypothyroidism     Irritable bowel syndrome         Review of Systems   Constitutional:  Negative for activity change, fatigue and unexpected weight change. Respiratory:  Negative for cough. Cardiovascular:  Negative for chest pain. Gastrointestinal:  Negative for abdominal pain, diarrhea, nausea and vomiting. Genitourinary:  Positive for frequency. Negative for difficulty urinating, flank pain, genital sores, hematuria, vaginal bleeding, vaginal discharge and vaginal pain. Skin: Negative. Psychiatric/Behavioral:  Positive for sleep disturbance. Objective   Physical Exam  HENT:      Head: Normocephalic and atraumatic. Eyes:      Extraocular Movements: Extraocular movements intact.       Conjunctiva/sclera: Conjunctivae normal.      Pupils: Pupils are equal, round, and reactive to light.   Cardiovascular:      Rate and Rhythm: Normal rate and regular rhythm.      Pulses: Normal pulses.      Heart sounds: Normal heart sounds.   Pulmonary:      Effort: Pulmonary effort is normal.      Breath sounds: Normal breath sounds.   Abdominal:      Palpations: Abdomen is soft.      Tenderness: There is no abdominal tenderness.   Genitourinary:     General: Normal vulva.      Vagina: No vaginal discharge.      Rectum: Normal.   Musculoskeletal:      Right lower leg: No edema.      Left lower leg: No edema.   Skin:     General: Skin is warm and dry.   Neurological:      Mental Status: She is alert.            --KIRAN Guerra - NP

## 2023-02-13 ENCOUNTER — HOSPITAL ENCOUNTER (OUTPATIENT)
Dept: WOMENS IMAGING | Age: 63
Discharge: HOME | End: 2023-02-13
Payer: COMMERCIAL

## 2023-02-13 DIAGNOSIS — N95.1 SYMPTOMATIC MENOPAUSAL OR FEMALE CLIMACTERIC STATES: ICD-10-CM

## 2023-02-13 DIAGNOSIS — E34.9 ENDOCRINE DISORDER RELATED TO PUBERTY: ICD-10-CM

## 2023-02-13 PROCEDURE — 77080 DXA BONE DENSITY AXIAL: CPT

## 2023-02-28 DIAGNOSIS — K21.9 GASTROESOPHAGEAL REFLUX DISEASE WITHOUT ESOPHAGITIS: ICD-10-CM

## 2023-02-28 DIAGNOSIS — E78.2 MIXED HYPERLIPIDEMIA: ICD-10-CM

## 2023-02-28 DIAGNOSIS — Z13.1 DIABETES MELLITUS SCREENING: ICD-10-CM

## 2023-02-28 LAB
A/G RATIO: 1.9 (ref 1.1–2.2)
ALBUMIN SERPL-MCNC: 4.3 G/DL (ref 3.4–5)
ALP BLD-CCNC: 95 U/L (ref 40–129)
ALT SERPL-CCNC: 21 U/L (ref 10–40)
ANION GAP SERPL CALCULATED.3IONS-SCNC: 15 MMOL/L (ref 3–16)
AST SERPL-CCNC: 24 U/L (ref 15–37)
BILIRUB SERPL-MCNC: 0.6 MG/DL (ref 0–1)
BUN BLDV-MCNC: 12 MG/DL (ref 7–20)
CALCIUM SERPL-MCNC: 9.9 MG/DL (ref 8.3–10.6)
CHLORIDE BLD-SCNC: 104 MMOL/L (ref 99–110)
CHOLESTEROL, FASTING: 239 MG/DL (ref 0–199)
CO2: 25 MMOL/L (ref 21–32)
CREAT SERPL-MCNC: 0.8 MG/DL (ref 0.6–1.2)
GFR SERPL CREATININE-BSD FRML MDRD: >60 ML/MIN/{1.73_M2}
GLUCOSE BLD-MCNC: 89 MG/DL (ref 70–99)
HCT VFR BLD CALC: 43.7 % (ref 36–48)
HDLC SERPL-MCNC: 84 MG/DL (ref 40–60)
HEMOGLOBIN: 14.4 G/DL (ref 12–16)
LDL CHOLESTEROL CALCULATED: 139 MG/DL
MCH RBC QN AUTO: 28.7 PG (ref 26–34)
MCHC RBC AUTO-ENTMCNC: 33 G/DL (ref 31–36)
MCV RBC AUTO: 86.9 FL (ref 80–100)
PDW BLD-RTO: 13.4 % (ref 12.4–15.4)
PLATELET # BLD: 289 K/UL (ref 135–450)
PMV BLD AUTO: 8.6 FL (ref 5–10.5)
POTASSIUM SERPL-SCNC: 4.4 MMOL/L (ref 3.5–5.1)
RBC # BLD: 5.03 M/UL (ref 4–5.2)
SODIUM BLD-SCNC: 144 MMOL/L (ref 136–145)
TOTAL PROTEIN: 6.6 G/DL (ref 6.4–8.2)
TRIGLYCERIDE, FASTING: 78 MG/DL (ref 0–150)
VLDLC SERPL CALC-MCNC: 16 MG/DL
WBC # BLD: 5.4 K/UL (ref 4–11)

## 2023-03-01 LAB
ESTIMATED AVERAGE GLUCOSE: 93.9 MG/DL
HBA1C MFR BLD: 4.9 %

## 2023-03-13 RX ORDER — LANSOPRAZOLE 30 MG
CAPSULE,DELAYED RELEASE (ENTERIC COATED) ORAL
Qty: 90 CAPSULE | Refills: 1 | Status: SHIPPED | OUTPATIENT
Start: 2023-03-13 | End: 2023-04-04 | Stop reason: SDUPTHER

## 2023-03-15 ENCOUNTER — TELEPHONE (OUTPATIENT)
Dept: FAMILY MEDICINE CLINIC | Age: 63
End: 2023-03-15

## 2023-03-24 NOTE — TELEPHONE ENCOUNTER
Attempted to reach pt, left v/m to call back. Need to know why she is asking for Diflucan, what symptoms?

## 2023-03-27 RX ORDER — FLUCONAZOLE 100 MG/1
TABLET ORAL
Qty: 7 TABLET | Refills: 0 | Status: SHIPPED | OUTPATIENT
Start: 2023-03-27

## 2023-04-04 ENCOUNTER — OFFICE VISIT (OUTPATIENT)
Dept: FAMILY MEDICINE CLINIC | Age: 63
End: 2023-04-04
Payer: COMMERCIAL

## 2023-04-04 VITALS
OXYGEN SATURATION: 98 % | SYSTOLIC BLOOD PRESSURE: 132 MMHG | TEMPERATURE: 98.1 F | DIASTOLIC BLOOD PRESSURE: 70 MMHG | RESPIRATION RATE: 16 BRPM | BODY MASS INDEX: 29.06 KG/M2 | WEIGHT: 153.8 LBS | HEART RATE: 88 BPM

## 2023-04-04 DIAGNOSIS — B00.9 RECURRENT HERPES SIMPLEX: ICD-10-CM

## 2023-04-04 DIAGNOSIS — E03.8 HYPOTHYROIDISM DUE TO HASHIMOTO'S THYROIDITIS: ICD-10-CM

## 2023-04-04 DIAGNOSIS — K21.9 GASTROESOPHAGEAL REFLUX DISEASE WITHOUT ESOPHAGITIS: ICD-10-CM

## 2023-04-04 DIAGNOSIS — E06.3 HYPOTHYROIDISM DUE TO HASHIMOTO'S THYROIDITIS: ICD-10-CM

## 2023-04-04 DIAGNOSIS — Z00.00 PHYSICAL EXAM: Primary | ICD-10-CM

## 2023-04-04 DIAGNOSIS — Z12.31 ENCOUNTER FOR SCREENING MAMMOGRAM FOR MALIGNANT NEOPLASM OF BREAST: ICD-10-CM

## 2023-04-04 PROCEDURE — 99396 PREV VISIT EST AGE 40-64: CPT | Performed by: INTERNAL MEDICINE

## 2023-04-04 RX ORDER — THYROID,PORK 90 MG
90 TABLET ORAL DAILY
COMMUNITY
Start: 2023-03-12

## 2023-04-04 RX ORDER — VALACYCLOVIR HYDROCHLORIDE 500 MG/1
500 TABLET, FILM COATED ORAL DAILY
Qty: 90 TABLET | Refills: 3 | Status: SHIPPED | OUTPATIENT
Start: 2023-04-04

## 2023-04-04 RX ORDER — LANSOPRAZOLE 30 MG
30 CAPSULE,DELAYED RELEASE (ENTERIC COATED) ORAL DAILY
Qty: 90 CAPSULE | Refills: 3 | Status: SHIPPED | OUTPATIENT
Start: 2023-04-04 | End: 2023-04-06 | Stop reason: SDUPTHER

## 2023-04-04 SDOH — ECONOMIC STABILITY: INCOME INSECURITY: HOW HARD IS IT FOR YOU TO PAY FOR THE VERY BASICS LIKE FOOD, HOUSING, MEDICAL CARE, AND HEATING?: NOT HARD AT ALL

## 2023-04-04 SDOH — ECONOMIC STABILITY: HOUSING INSECURITY
IN THE LAST 12 MONTHS, WAS THERE A TIME WHEN YOU DID NOT HAVE A STEADY PLACE TO SLEEP OR SLEPT IN A SHELTER (INCLUDING NOW)?: YES

## 2023-04-04 SDOH — ECONOMIC STABILITY: FOOD INSECURITY: WITHIN THE PAST 12 MONTHS, YOU WORRIED THAT YOUR FOOD WOULD RUN OUT BEFORE YOU GOT MONEY TO BUY MORE.: NEVER TRUE

## 2023-04-04 SDOH — ECONOMIC STABILITY: FOOD INSECURITY: WITHIN THE PAST 12 MONTHS, THE FOOD YOU BOUGHT JUST DIDN'T LAST AND YOU DIDN'T HAVE MONEY TO GET MORE.: NEVER TRUE

## 2023-04-04 ASSESSMENT — ENCOUNTER SYMPTOMS
COUGH: 0
VOMITING: 0
CONSTIPATION: 0
SHORTNESS OF BREATH: 0
ABDOMINAL PAIN: 0
BACK PAIN: 0
SINUS PRESSURE: 0
BLOOD IN STOOL: 0
TROUBLE SWALLOWING: 0
NAUSEA: 0
DIARRHEA: 0

## 2023-04-04 ASSESSMENT — PATIENT HEALTH QUESTIONNAIRE - PHQ9
SUM OF ALL RESPONSES TO PHQ QUESTIONS 1-9: 0
SUM OF ALL RESPONSES TO PHQ9 QUESTIONS 1 & 2: 0
1. LITTLE INTEREST OR PLEASURE IN DOING THINGS: 0
SUM OF ALL RESPONSES TO PHQ QUESTIONS 1-9: 0
2. FEELING DOWN, DEPRESSED OR HOPELESS: 0

## 2023-04-04 NOTE — PROGRESS NOTES
[facility]    UPPER GASTROINTESTINAL ENDOSCOPY N/A 9/3/2021    EGD BIOPSY performed by Martha Lemus MD at [facility]     Family History   Problem Relation Age of Onset    Heart Disease Mother         sudden death 61    High Blood Pressure Mother     Anxiety Disorder Mother     No Known Problems Father     Alcohol Abuse Brother     Substance Abuse Brother     No Known Problems Maternal Grandmother     No Known Problems Maternal Grandfather     No Known Problems Paternal Grandmother     No Known Problems Paternal Grandfather     No Known Problems Brother     No Known Problems Brother       Review of Systems   Constitutional:  Negative for activity change, fatigue and unexpected weight change. HENT:  Negative for congestion, nosebleeds, sinus pressure and trouble swallowing. Eyes:  Negative for visual disturbance (glasses). Respiratory:  Negative for cough and shortness of breath. Cardiovascular:  Negative for chest pain, palpitations and leg swelling. Gastrointestinal:  Negative for abdominal pain, blood in stool, constipation, diarrhea, nausea and vomiting. Endocrine: Positive for cold intolerance. Negative for heat intolerance. Self breast exams negative    Genitourinary:  Negative for dysuria and vaginal bleeding. Musculoskeletal:  Negative for arthralgias and back pain. Skin:         No concerning skin lesions and saw derm in December    Neurological:  Negative for dizziness, light-headedness and headaches. Psychiatric/Behavioral:  Negative for dysphoric mood and sleep disturbance. OBJECTIVE:  /70 (Site: Left Upper Arm, Position: Sitting, Cuff Size: Medium Adult)   Pulse 88   Temp 98.1 °F (36.7 °C) (Oral)   Resp 16   Wt 153 lb 12.8 oz (69.8 kg)   LMP  (LMP Unknown)   SpO2 98%   BMI 29.06 kg/m²      Body mass index is 29.06 kg/m². Physical Exam  Constitutional:       General: She is not in acute distress. Appearance: Normal appearance.  She is

## 2023-05-30 ENCOUNTER — HOSPITAL ENCOUNTER (OUTPATIENT)
Dept: WOMENS IMAGING | Age: 63
Discharge: HOME | End: 2023-05-30
Payer: COMMERCIAL

## 2023-05-30 DIAGNOSIS — Z12.31 ENCOUNTER FOR SCREENING MAMMOGRAM FOR MALIGNANT NEOPLASM OF BREAST: ICD-10-CM

## 2023-05-30 PROCEDURE — 77063 BREAST TOMOSYNTHESIS BI: CPT

## 2023-06-01 ENCOUNTER — TELEPHONE (OUTPATIENT)
Dept: FAMILY MEDICINE CLINIC | Age: 63
End: 2023-06-01

## 2023-06-01 DIAGNOSIS — R92.0 ABNORMAL MAMMOGRAM WITH MICROCALCIFICATION: Primary | ICD-10-CM

## 2023-06-01 NOTE — TELEPHONE ENCOUNTER
Patient received a [employer] message stating she needs a diagnostic  right breast mammogram. And ultrasaound. Please let patient know when orders are complete.

## 2023-06-12 DIAGNOSIS — R92.8 ABNORMAL MAMMOGRAM OF RIGHT BREAST: Primary | ICD-10-CM

## 2023-06-26 ENCOUNTER — TELEPHONE (OUTPATIENT)
Dept: FAMILY MEDICINE CLINIC | Age: 63
End: 2023-06-26

## 2023-06-26 DIAGNOSIS — M79.672 FOOT ARCH PAIN, LEFT: Primary | ICD-10-CM

## 2023-07-25 ENCOUNTER — OFFICE VISIT (OUTPATIENT)
Dept: ORTHOPEDIC SURGERY | Age: 63
End: 2023-07-25

## 2023-07-25 DIAGNOSIS — M54.2 NECK PAIN: ICD-10-CM

## 2023-07-25 DIAGNOSIS — M75.42 IMPINGEMENT SYNDROME OF LEFT SHOULDER: ICD-10-CM

## 2023-07-25 DIAGNOSIS — M50.30 DEGENERATIVE DISC DISEASE, CERVICAL: ICD-10-CM

## 2023-07-25 DIAGNOSIS — M67.912 TENDINOPATHY OF LEFT ROTATOR CUFF: ICD-10-CM

## 2023-07-25 DIAGNOSIS — M25.512 LEFT SHOULDER PAIN, UNSPECIFIED CHRONICITY: Primary | ICD-10-CM

## 2023-07-25 NOTE — PROGRESS NOTES
Chief Complaint  Shoulder Pain (CK L SHOULDER )        Evaluation recurrent left shoulder pain with mild weakness and history of chronic cervical pain with myofascial pain    History of Present Illness:  Jacob Yan is a 58 y.o. female who is a very pleasant right-hand-dominant white female who is a  who is currently laid off and is a very nice patient of Dr. Raad Prasad who is being seen today in kind consultation from Cris Walters CNP for evaluation of chronic cervical myofascial pain but newer onset of pain to her left shoulder. She states she has a longstanding history of tightness to the cervical spine and typically does get massages 1 time per month and this has been a longstanding issue. She did have an initial evaluation with imaging in summer 2021 where x-rays did reveal degenerative disc changes and loss of cervical lordosis at C5-6 but at that point was not really complaining of associated shoulder or arm discomfort. There is no history of injury or no activity prior to becoming symptomatic and has occasionally taken Advil and Aleve for this and once again does get her massage treatments 1 time per month. She does have a newer complaint that of soreness and stiffness to her shoulder and into the upper arm since roughly April 2022. Once again there is no history of injury no activity prior to coming symptomatic although she does have a chronic achy type sensation over the greater tuberosity extending to the upper arm. This is once again failed to respond to her massage therapy and Advil over-the-counter prompting today's consultation after seeing Cris Walters CNP on 7/7/2022.      We initially saw Sophia Estrada in the office on 7/19/2022 who is a  who does have a known history of longstanding cervical myofascial pain and was found on work-up to have underlying cervical degenerative disc disease as well as left shoulder pain with suspected cuff tendinopathy and

## 2023-08-01 ENCOUNTER — TELEPHONE (OUTPATIENT)
Dept: ORTHOPEDIC SURGERY | Age: 63
End: 2023-08-01

## 2023-08-01 NOTE — TELEPHONE ENCOUNTER
Spoke to patient and let her know that MRI order was faxed, no pre-cert was required with her insurance.

## 2023-08-01 NOTE — TELEPHONE ENCOUNTER
General Question     Subject: REQUESTING A REFERRAL FOR A MRI TO GO TO [facility]/ [address] [place] [address]    [phone]  Patient: Paris Dwain \"Lakshmi\"  Contact Number: [phone]

## 2023-08-07 ENCOUNTER — OFFICE VISIT (OUTPATIENT)
Dept: FAMILY MEDICINE CLINIC | Age: 63
End: 2023-08-07
Payer: COMMERCIAL

## 2023-08-07 VITALS
BODY MASS INDEX: 28.77 KG/M2 | WEIGHT: 152.4 LBS | OXYGEN SATURATION: 98 % | DIASTOLIC BLOOD PRESSURE: 86 MMHG | HEIGHT: 61 IN | TEMPERATURE: 98.2 F | HEART RATE: 84 BPM | SYSTOLIC BLOOD PRESSURE: 158 MMHG

## 2023-08-07 DIAGNOSIS — B96.89 ACUTE BACTERIAL SINUSITIS: Primary | ICD-10-CM

## 2023-08-07 DIAGNOSIS — J01.90 ACUTE BACTERIAL SINUSITIS: Primary | ICD-10-CM

## 2023-08-07 DIAGNOSIS — H65.91 MIDDLE EAR EFFUSION, RIGHT: ICD-10-CM

## 2023-08-07 PROCEDURE — 99213 OFFICE O/P EST LOW 20 MIN: CPT | Performed by: NURSE PRACTITIONER

## 2023-08-07 RX ORDER — FLUCONAZOLE 150 MG/1
150 TABLET ORAL ONCE
Qty: 1 TABLET | Refills: 0 | Status: SHIPPED | OUTPATIENT
Start: 2023-08-07 | End: 2023-08-07

## 2023-08-07 RX ORDER — CEFUROXIME AXETIL 250 MG/1
250 TABLET ORAL 2 TIMES DAILY
Qty: 14 TABLET | Refills: 0 | Status: SHIPPED | OUTPATIENT
Start: 2023-08-07 | End: 2023-08-14

## 2023-08-07 ASSESSMENT — ENCOUNTER SYMPTOMS
SORE THROAT: 0
RHINORRHEA: 0
ABDOMINAL PAIN: 0
SINUS PRESSURE: 1
TROUBLE SWALLOWING: 0
COUGH: 0
SHORTNESS OF BREATH: 0

## 2023-08-07 NOTE — PROGRESS NOTES
Naldo Aponte (:  1960) is a 58 y.o. female,Established patient, here for evaluation of the following chief complaint(s):  Ear Fullness (Bilateral ear pressure of and on for weeks. Takes sinus medicine daily and has been trying Flonaise. Voice hoarse, no sore throat. Some sinus pressure in face and slight drainage in throat )         ASSESSMENT/PLAN:  1. Acute bacterial sinusitis  New. Take medications as prescribed. 2. Middle ear effusion, right  New. Discussed changing daily allergy pill to different OTC due to chronic use. Use Flonase BID       Return if symptoms worsen or fail to improve. Subjective   SUBJECTIVE/OBJECTIVE:  HPI  Chief Complaint   Patient presents with    Ear Fullness     Bilateral ear pressure of and on for weeks. Takes sinus medicine daily and has been trying Flonaise. Voice hoarse, no sore throat. Some sinus pressure in face and slight drainage in throat        Intermittent symptoms for the last few weeks. Reports ear fullness and sinus congestion are  worsening. Taking flonase and daily allergy pill but has ongoing Selvin ear fullness, PND, facial pressure. Denies nasal drainage, cough, sob or wheezing.      Current Outpatient Medications   Medication Sig Dispense Refill    cefUROXime (CEFTIN) 250 MG tablet Take 1 tablet by mouth 2 times daily for 7 days 14 tablet 0    fluconazole (DIFLUCAN) 150 MG tablet Take 1 tablet by mouth once for 1 dose 1 tablet 0    diclofenac sodium (VOLTAREN) 1 % GEL Apply 4 g topically 4 times daily as needed for Pain 100 g 3    PREVACID 30 MG delayed release capsule Take 1 capsule by mouth daily GABI 90 capsule 3    ARMOUR THYROID 90 MG tablet Take 1 tablet by mouth daily      valACYclovir (VALTREX) 500 MG tablet Take 1 tablet by mouth daily 90 tablet 3    Ascorbic Acid (VITAMIN C PO) Take 1 tablet by mouth daily      Probiotic Product (PROBIOTIC PO) Take 1 capsule by mouth daily      Hormone Cream Base (HRT CREAM BASE) CREA daily

## 2023-08-15 ENCOUNTER — OFFICE VISIT (OUTPATIENT)
Dept: ORTHOPEDIC SURGERY | Age: 63
End: 2023-08-15
Payer: COMMERCIAL

## 2023-08-15 DIAGNOSIS — M67.912 TENDINOPATHY OF LEFT ROTATOR CUFF: ICD-10-CM

## 2023-08-15 DIAGNOSIS — M25.512 LEFT SHOULDER PAIN, UNSPECIFIED CHRONICITY: ICD-10-CM

## 2023-08-15 DIAGNOSIS — M50.30 DEGENERATIVE DISC DISEASE, CERVICAL: ICD-10-CM

## 2023-08-15 DIAGNOSIS — M19.019 AC JOINT ARTHROPATHY: ICD-10-CM

## 2023-08-15 DIAGNOSIS — M75.102 TEAR OF LEFT ROTATOR CUFF, UNSPECIFIED TEAR EXTENT, UNSPECIFIED WHETHER TRAUMATIC: Primary | ICD-10-CM

## 2023-08-15 DIAGNOSIS — M75.42 IMPINGEMENT SYNDROME OF LEFT SHOULDER: ICD-10-CM

## 2023-08-15 PROCEDURE — 99213 OFFICE O/P EST LOW 20 MIN: CPT | Performed by: FAMILY MEDICINE

## 2023-08-15 NOTE — PROGRESS NOTES
have underlying cervical degenerative disc changes but I am not highly suspicious of a high-grade radiculopathy currently as I still think the majority of her symptoms are likely related to her left shoulder. With treatment last year she had done very well but since roughly March of this year she has had some recurrence of episodic left shoulder pain. Her symptoms have improved about 50% with reinstituting her home-based exercises using her Voltaren gel and with a subacromial injection on 7/25/2023. She describes her Symptoms as irritating as opposed to highly limiting. She was encouraged to continue with her exercise program as well but given the full-thickness nature of her tear even though it is small, would like for her to have consultation with Dr. Jimmy Boudreaux as she is young and still quite active. Icing and activity modification was discussed. We will see her back as needed. She will contact us with questions or concerns. CC: Dr. Uriel Paulino and Latosha Soto CNP      This dictation was performed with a verbal recognition program [facility] ) and it was checked for errors. It is possible that there are still dictated errors within this office note. If so, please bring any errors to my attention for an addendum. All efforts were made to ensure that this office note is accurate.

## 2023-08-20 SDOH — HEALTH STABILITY: PHYSICAL HEALTH: ON AVERAGE, HOW MANY DAYS PER WEEK DO YOU ENGAGE IN MODERATE TO STRENUOUS EXERCISE (LIKE A BRISK WALK)?: 2 DAYS

## 2023-08-20 SDOH — HEALTH STABILITY: PHYSICAL HEALTH: ON AVERAGE, HOW MANY MINUTES DO YOU ENGAGE IN EXERCISE AT THIS LEVEL?: 20 MIN

## 2023-08-21 ENCOUNTER — OFFICE VISIT (OUTPATIENT)
Dept: ORTHOPEDIC SURGERY | Age: 63
End: 2023-08-21

## 2023-08-21 VITALS — BODY MASS INDEX: 28.7 KG/M2 | HEIGHT: 61 IN | WEIGHT: 152 LBS

## 2023-08-21 DIAGNOSIS — M67.912 TENDINOPATHY OF LEFT ROTATOR CUFF: ICD-10-CM

## 2023-08-21 DIAGNOSIS — M75.102 TEAR OF LEFT ROTATOR CUFF, UNSPECIFIED TEAR EXTENT, UNSPECIFIED WHETHER TRAUMATIC: ICD-10-CM

## 2023-08-21 DIAGNOSIS — M75.42 IMPINGEMENT SYNDROME OF LEFT SHOULDER: Primary | ICD-10-CM

## 2023-08-21 NOTE — PROGRESS NOTES
Fern Hermann today at the request of Dr. Pippa Guerra for evaluation and treatment of her left shoulder. She had pain for about 18 months. She had a cortisone injection last July as well as physical therapy. She continue to have symptoms and another cortisone injection about a month ago but continues to have significant pain. Her pain was as bad as 8 out of 10 and is now about 4 out of 10. Her pain is anterior and lateral.    She has reflux. She is a  who works from home. She is right-hand dominant. History: Patient's relevant past family, medical, and social history are reviewed as part of today's visit. ROS of pertinent positives and negatives as above; otherwise negative. General Exam:    Vitals: Height 5' 1\" (1.549 m), weight 152 lb (68.9 kg), not currently breastfeeding. Constitutional: Patient is adequately groomed with no evidence of malnutrition  Mental Status: The patient is oriented to time, place and person. The patient's mood and affect are appropriate. Gait:  Patient walks with normal gait and station. Lymphatic: The lymphatic examination bilaterally reveals all areas to be without enlargement or induration. Vascular: Examination reveals no swelling or calf tenderness. Peripheral pulses are palpable and 2+. Neurological: The patient has good coordination. There is no weakness or sensory deficit. Skin:    Head/Neck: inspection reveals no rashes, ulcerations or lesions. Trunk:  inspection reveals no rashes, ulcerations or lesions. Right Lower Extremity: inspection reveals no rashes, ulcerations or lesions. Left Lower Extremity: inspection reveals no rashes, ulcerations or lesions. Examination of the cervical spine reveals no restriction in motion. There are no reproduction of symptoms into either arm with flexion, extension, rotation or palpation. The patient has a negative Spurling sign, and no tenderness.     Examination of the right shoulder

## 2023-08-22 ENCOUNTER — TELEPHONE (OUTPATIENT)
Dept: ORTHOPEDIC SURGERY | Age: 63
End: 2023-08-22

## 2023-08-22 NOTE — TELEPHONE ENCOUNTER
Please reach out to patient for their current insurance information. When putting in the primary insurance, [employer], the message coming back is : This contract not eligible for precertification. Enter another contract. Is the [employer] [place] still the primary insurance? Please advise.

## 2023-08-23 RX ORDER — CETIRIZINE HYDROCHLORIDE 10 MG/1
10 TABLET ORAL DAILY
COMMUNITY

## 2023-08-23 NOTE — TELEPHONE ENCOUNTER
Auth: NPR  Date: 2023-09-13 - 2023-12-13  Reference # Transaction ID: [id number]  Spoke with: NONE  Type of SX: OUTPATIENT  Location: [facility]  CPT: 68366, [id number]   DX: [phone]  SX area: [facility]  Insurance: [facility]

## 2023-08-23 NOTE — PROGRESS NOTES
[address]  time___0825_________        Surgery time__10:25__________    Take the following medications with a sip of water: Follow your MD/Surgeons pre-procedure instructions regarding your medications     Do not eat or drink anything after 12:00 midnight prior to your surgery. This includes water chewing gum, mints and ice chips. You may brush your teeth and gargle the morning of your surgery, but do not swallow the water     Please see your family doctor/pediatrician for a history and physical and/or concerning medications. Bring any test results/reports from your physicians office. If you are under the care of a heart doctor or specialist doctor, please be aware that you may be asked to them for clearance    You may be asked to stop blood thinners such as Coumadin, Plavix, Fragmin, Lovenox, etc., or any anti-inflammatories such as:  Aspirin, Ibuprofen, Advil, Naproxen prior to your surgery. We also ask that you stop any OTC medications such as fish oil, vitamin E, glucosamine, garlic, Multivitamins, COQ 10, etc. MAY TAKE TYLENOL    We ask that you do not smoke 24 hours prior to surgery  We ask that you do not  drink any alcoholic beverages 24 hours prior to surgery     You must make arrangements for a responsible adult to take you home after your surgery. For your safety you will not be allowed to leave alone or drive yourself home. Your surgery will be cancelled if you do not have a ride home. Also for your safety, it is strongly suggested that someone stay with you the first 24 hours after your surgery. A parent or legal guardian must accompany a child scheduled for surgery and plan to stay at the hospital until the child is discharged. Please do not bring other children with you. For your comfort, please wear simple loose fitting clothing to the hospital.  Please do not bring valuables.     Do not wear any make-up or nail polish on

## 2023-08-23 NOTE — PROGRESS NOTES
[facility] Pre-Admission Testing Electronic Communication Worksheet for OR/ENDO Procedures        Patient: Himanshu Strickland    DOS: 9/13/23    Arrival Time: 8:25AM    Surgery Time: 10:25AM    Meds to Bed:  [x] YES    []  NO    Transportation Confirmed: [x] YES    []  NO     History and Physical:  [] YES    [x]  NO  [] N/A  If yes, please list doctor or Urgent Care and date of H&P: PATIENT NEEDS TO MAKE APPOINTMENT WITH DR. WALKER [facility]    Additional Clearance(Cardiac, Pulmonary, etc):  [] YES    [x]  NO    Pre-Admission Testing Visit:  [] YES    [x]  NO If no, do labs/testing need to be done DOS?   [] YES    [x]  NO    Medication Reconciliation Complete:  [x] YES    []  NO        Additional Notes:      PATIENT HAS LATEX ALLERGY          Interview Complete: [x] YES    []  NO          Teri Suarez RN  5:39 PM

## 2023-08-23 NOTE — PROGRESS NOTES
Follow Up Prior to Surgery    DOS: 23  :1960      History and Physical: PATIENT STILL NEEDS TO MAKE APPOINTMENT WITH PCP DR. SURAJ AGOSTO [facility] PHYSICIAN) FOR MEDICAL CLEARANCE-    UPDATE: 23 Pt. Saw Lawanda Jaimes on 23. Medically cleared for procedure.

## 2023-08-26 ENCOUNTER — TELEPHONE (OUTPATIENT)
Dept: ORTHOPEDIC SURGERY | Age: 63
End: 2023-08-26

## 2023-08-31 ENCOUNTER — OFFICE VISIT (OUTPATIENT)
Dept: FAMILY MEDICINE CLINIC | Age: 63
End: 2023-08-31
Payer: COMMERCIAL

## 2023-08-31 VITALS
DIASTOLIC BLOOD PRESSURE: 78 MMHG | WEIGHT: 151.6 LBS | HEART RATE: 85 BPM | SYSTOLIC BLOOD PRESSURE: 138 MMHG | TEMPERATURE: 96.8 F | BODY MASS INDEX: 28.62 KG/M2 | OXYGEN SATURATION: 98 % | HEIGHT: 61 IN

## 2023-08-31 DIAGNOSIS — M75.102 ROTATOR CUFF SYNDROME OF LEFT SHOULDER: ICD-10-CM

## 2023-08-31 DIAGNOSIS — E06.3 HYPOTHYROIDISM DUE TO HASHIMOTO'S THYROIDITIS: ICD-10-CM

## 2023-08-31 DIAGNOSIS — M67.912 DISORDER OF LEFT ROTATOR CUFF: ICD-10-CM

## 2023-08-31 DIAGNOSIS — Z01.818 PREOP EXAMINATION: Primary | ICD-10-CM

## 2023-08-31 DIAGNOSIS — E03.8 HYPOTHYROIDISM DUE TO HASHIMOTO'S THYROIDITIS: ICD-10-CM

## 2023-08-31 PROCEDURE — 93000 ELECTROCARDIOGRAM COMPLETE: CPT | Performed by: NURSE PRACTITIONER

## 2023-08-31 PROCEDURE — 99214 OFFICE O/P EST MOD 30 MIN: CPT | Performed by: NURSE PRACTITIONER

## 2023-08-31 ASSESSMENT — ENCOUNTER SYMPTOMS
COUGH: 0
CONSTIPATION: 1
TROUBLE SWALLOWING: 0
DIARRHEA: 0
BACK PAIN: 0
VOMITING: 0
SORE THROAT: 0
APNEA: 0
SHORTNESS OF BREATH: 0
RHINORRHEA: 0
WHEEZING: 0
CHEST TIGHTNESS: 0
ABDOMINAL PAIN: 0
ROS SKIN COMMENTS: NO HISTORY OF MRSA
COLOR CHANGE: 0
NAUSEA: 0

## 2023-08-31 NOTE — PROGRESS NOTES
round, and reactive to light. Neck:      Vascular: No carotid bruit. Cardiovascular:      Rate and Rhythm: Normal rate and regular rhythm. Heart sounds: Murmur heard. Systolic murmur is present with a grade of 1/6. Pulmonary:      Effort: Pulmonary effort is normal.      Breath sounds: Normal breath sounds. Abdominal:      General: Bowel sounds are normal.      Palpations: Abdomen is soft. Musculoskeletal:      Right lower leg: No edema. Left lower leg: No edema. Lymphadenopathy:      Cervical: No cervical adenopathy. Skin:     General: Skin is warm and dry. Neurological:      Mental Status: She is alert and oriented to person, place, and time. Psychiatric:         Mood and Affect: Mood normal.       Labs      Orders Only on 02/28/2023   Component Date Value Ref Range Status    Hemoglobin A1C 02/28/2023 4.9  See comment % Final    Comment: Comment:  Diagnosis of Diabetes: > or = 6.5%  Increased risk of diabetes (Prediabetes): 5.7-6.4%  Glycemic Control: Nonpregnant Adults: <7.0%                    Pregnant: <6.0%        eAG 02/28/2023 93.9  mg/dL Final    Sodium 02/28/2023 144  136 - 145 mmol/L Final    Potassium 02/28/2023 4.4  3.5 - 5.1 mmol/L Final    Chloride 02/28/2023 104  99 - 110 mmol/L Final    CO2 02/28/2023 25  21 - 32 mmol/L Final    Anion Gap 02/28/2023 15  3 - 16 Final    Glucose 02/28/2023 89  70 - 99 mg/dL Final    BUN 02/28/2023 12  7 - 20 mg/dL Final    Creatinine 02/28/2023 0.8  0.6 - 1.2 mg/dL Final    Est, Glom Filt Rate 02/28/2023 >60  >60 Final    Comment: Pediatric calculator link  [facility]. org/professionals/kdoqi/gfr_calculatorped  Effective Oct 3, 2022  These results are not intended for use in patients  <25years of age. eGFR results are calculated without  a race factor using the 2021 CKD-EPI equation. Careful  clinical correlation is recommended, particularly when  comparing to results calculated using previous equations.   The CKD-EPI equation is

## 2023-09-06 ENCOUNTER — TELEPHONE (OUTPATIENT)
Dept: ORTHOPEDIC SURGERY | Age: 63
End: 2023-09-06

## 2023-09-06 NOTE — TELEPHONE ENCOUNTER
Other Patient is requesting a call back from the nurse Laurel Jewell)       Hernandez Marie- [phone]

## 2023-09-07 NOTE — TELEPHONE ENCOUNTER
Spoke with patient and let her know forms were completed on 8-26-23. She states that they may be sending additional forms to be completed.

## 2023-09-11 ENCOUNTER — TELEPHONE (OUTPATIENT)
Dept: ORTHOPEDIC SURGERY | Age: 63
End: 2023-09-11

## 2023-09-18 ENCOUNTER — ANESTHESIA EVENT (OUTPATIENT)
Dept: OPERATING ROOM | Age: 63
End: 2023-09-18
Payer: COMMERCIAL

## 2023-09-18 DIAGNOSIS — M75.42 IMPINGEMENT SYNDROME OF LEFT SHOULDER: Primary | ICD-10-CM

## 2023-09-18 DIAGNOSIS — M67.912 TENDINOPATHY OF LEFT ROTATOR CUFF: ICD-10-CM

## 2023-09-18 DIAGNOSIS — M75.102 TEAR OF LEFT ROTATOR CUFF, UNSPECIFIED TEAR EXTENT, UNSPECIFIED WHETHER TRAUMATIC: ICD-10-CM

## 2023-09-18 RX ORDER — DOCUSATE SODIUM 100 MG/1
100 CAPSULE, LIQUID FILLED ORAL 2 TIMES DAILY
Qty: 60 CAPSULE | Refills: 0 | Status: SHIPPED | OUTPATIENT
Start: 2023-09-20

## 2023-09-18 RX ORDER — PROMETHAZINE HYDROCHLORIDE 25 MG/1
25 TABLET ORAL EVERY 6 HOURS PRN
Qty: 30 TABLET | Refills: 0 | Status: SHIPPED | OUTPATIENT
Start: 2023-09-20 | End: 2023-09-25

## 2023-09-18 RX ORDER — OXYCODONE HYDROCHLORIDE AND ACETAMINOPHEN 5; 325 MG/1; MG/1
1 TABLET ORAL EVERY 6 HOURS PRN
Qty: 20 TABLET | Refills: 0 | Status: SHIPPED | OUTPATIENT
Start: 2023-09-20 | End: 2023-09-25

## 2023-09-19 ENCOUNTER — TELEPHONE (OUTPATIENT)
Dept: ORTHOPEDIC SURGERY | Age: 63
End: 2023-09-19

## 2023-09-20 ENCOUNTER — ANESTHESIA (OUTPATIENT)
Dept: OPERATING ROOM | Age: 63
End: 2023-09-20
Payer: COMMERCIAL

## 2023-09-20 ENCOUNTER — HOSPITAL ENCOUNTER (OUTPATIENT)
Age: 63
Setting detail: OUTPATIENT SURGERY
Discharge: HOME | End: 2023-09-20
Attending: ORTHOPAEDIC SURGERY | Admitting: ORTHOPAEDIC SURGERY
Payer: COMMERCIAL

## 2023-09-20 VITALS
SYSTOLIC BLOOD PRESSURE: 154 MMHG | HEART RATE: 96 BPM | RESPIRATION RATE: 16 BRPM | DIASTOLIC BLOOD PRESSURE: 91 MMHG | TEMPERATURE: 98.5 F | BODY MASS INDEX: 28.76 KG/M2 | HEIGHT: 61 IN | OXYGEN SATURATION: 96 % | WEIGHT: 152.34 LBS

## 2023-09-20 PROCEDURE — 64415 NJX AA&/STRD BRCH PLXS IMG: CPT | Performed by: ANESTHESIOLOGY

## 2023-09-20 PROCEDURE — 3700000001 HC ADD 15 MINUTES (ANESTHESIA): Performed by: ORTHOPAEDIC SURGERY

## 2023-09-20 PROCEDURE — 6360000002 HC RX W HCPCS: Performed by: ANESTHESIOLOGY

## 2023-09-20 PROCEDURE — 2500000003 HC RX 250 WO HCPCS: Performed by: ORTHOPAEDIC SURGERY

## 2023-09-20 PROCEDURE — 3600000014 HC SURGERY LEVEL 4 ADDTL 15MIN: Performed by: ORTHOPAEDIC SURGERY

## 2023-09-20 PROCEDURE — 2709999900 HC NON-CHARGEABLE SUPPLY: Performed by: ORTHOPAEDIC SURGERY

## 2023-09-20 PROCEDURE — 7100000011 HC PHASE II RECOVERY - ADDTL 15 MIN: Performed by: ORTHOPAEDIC SURGERY

## 2023-09-20 PROCEDURE — 2580000003 HC RX 258: Performed by: STUDENT IN AN ORGANIZED HEALTH CARE EDUCATION/TRAINING PROGRAM

## 2023-09-20 PROCEDURE — C9399 UNCLASSIFIED DRUGS OR BIOLOG: HCPCS

## 2023-09-20 PROCEDURE — 3700000000 HC ANESTHESIA ATTENDED CARE: Performed by: ORTHOPAEDIC SURGERY

## 2023-09-20 PROCEDURE — 7100000010 HC PHASE II RECOVERY - FIRST 15 MIN: Performed by: ORTHOPAEDIC SURGERY

## 2023-09-20 PROCEDURE — 3600000004 HC SURGERY LEVEL 4 BASE: Performed by: ORTHOPAEDIC SURGERY

## 2023-09-20 PROCEDURE — 2580000003 HC RX 258: Performed by: ORTHOPAEDIC SURGERY

## 2023-09-20 PROCEDURE — 6360000002 HC RX W HCPCS: Performed by: ORTHOPAEDIC SURGERY

## 2023-09-20 PROCEDURE — C1713 ANCHOR/SCREW BN/BN,TIS/BN: HCPCS | Performed by: ORTHOPAEDIC SURGERY

## 2023-09-20 PROCEDURE — 7100000001 HC PACU RECOVERY - ADDTL 15 MIN: Performed by: ORTHOPAEDIC SURGERY

## 2023-09-20 PROCEDURE — 6360000002 HC RX W HCPCS

## 2023-09-20 PROCEDURE — 7100000000 HC PACU RECOVERY - FIRST 15 MIN: Performed by: ORTHOPAEDIC SURGERY

## 2023-09-20 PROCEDURE — 2500000003 HC RX 250 WO HCPCS

## 2023-09-20 RX ORDER — SODIUM CHLORIDE, SODIUM LACTATE, POTASSIUM CHLORIDE, CALCIUM CHLORIDE 600; 310; 30; 20 MG/100ML; MG/100ML; MG/100ML; MG/100ML
INJECTION, SOLUTION INTRAVENOUS CONTINUOUS PRN
Status: COMPLETED | OUTPATIENT
Start: 2023-09-20 | End: 2023-09-20

## 2023-09-20 RX ORDER — SODIUM CHLORIDE 0.9 % (FLUSH) 0.9 %
5-40 SYRINGE (ML) INJECTION PRN
Status: DISCONTINUED | OUTPATIENT
Start: 2023-09-20 | End: 2023-09-20 | Stop reason: HOSPADM

## 2023-09-20 RX ORDER — LIDOCAINE HYDROCHLORIDE 20 MG/ML
INJECTION, SOLUTION EPIDURAL; INFILTRATION; INTRACAUDAL; PERINEURAL PRN
Status: DISCONTINUED | OUTPATIENT
Start: 2023-09-20 | End: 2023-09-20 | Stop reason: SDUPTHER

## 2023-09-20 RX ORDER — ONDANSETRON 2 MG/ML
INJECTION INTRAMUSCULAR; INTRAVENOUS PRN
Status: DISCONTINUED | OUTPATIENT
Start: 2023-09-20 | End: 2023-09-20 | Stop reason: SDUPTHER

## 2023-09-20 RX ORDER — BUPIVACAINE HYDROCHLORIDE 5 MG/ML
INJECTION, SOLUTION EPIDURAL; INTRACAUDAL
Status: COMPLETED
Start: 2023-09-20 | End: 2023-09-20

## 2023-09-20 RX ORDER — SUCCINYLCHOLINE/SOD CL,ISO/PF 200MG/10ML
SYRINGE (ML) INTRAVENOUS PRN
Status: DISCONTINUED | OUTPATIENT
Start: 2023-09-20 | End: 2023-09-20 | Stop reason: SDUPTHER

## 2023-09-20 RX ORDER — ROCURONIUM BROMIDE 10 MG/ML
INJECTION, SOLUTION INTRAVENOUS PRN
Status: DISCONTINUED | OUTPATIENT
Start: 2023-09-20 | End: 2023-09-20 | Stop reason: SDUPTHER

## 2023-09-20 RX ORDER — LIDOCAINE HYDROCHLORIDE 20 MG/ML
INJECTION, SOLUTION EPIDURAL; INFILTRATION; INTRACAUDAL; PERINEURAL
Status: DISCONTINUED
Start: 2023-09-20 | End: 2023-09-20 | Stop reason: HOSPADM

## 2023-09-20 RX ORDER — SODIUM CHLORIDE 0.9 % (FLUSH) 0.9 %
5-40 SYRINGE (ML) INJECTION EVERY 12 HOURS SCHEDULED
Status: DISCONTINUED | OUTPATIENT
Start: 2023-09-20 | End: 2023-09-20 | Stop reason: HOSPADM

## 2023-09-20 RX ORDER — DEXAMETHASONE SODIUM PHOSPHATE 4 MG/ML
INJECTION, SOLUTION INTRA-ARTICULAR; INTRALESIONAL; INTRAMUSCULAR; INTRAVENOUS; SOFT TISSUE PRN
Status: DISCONTINUED | OUTPATIENT
Start: 2023-09-20 | End: 2023-09-20 | Stop reason: SDUPTHER

## 2023-09-20 RX ORDER — PHENYLEPHRINE HCL IN 0.9% NACL 1 MG/10 ML
SYRINGE (ML) INTRAVENOUS PRN
Status: DISCONTINUED | OUTPATIENT
Start: 2023-09-20 | End: 2023-09-20 | Stop reason: SDUPTHER

## 2023-09-20 RX ORDER — SODIUM CHLORIDE 9 MG/ML
INJECTION, SOLUTION INTRAVENOUS PRN
Status: DISCONTINUED | OUTPATIENT
Start: 2023-09-20 | End: 2023-09-20 | Stop reason: HOSPADM

## 2023-09-20 RX ORDER — MIDAZOLAM HYDROCHLORIDE 1 MG/ML
INJECTION INTRAMUSCULAR; INTRAVENOUS PRN
Status: DISCONTINUED | OUTPATIENT
Start: 2023-09-20 | End: 2023-09-20 | Stop reason: SDUPTHER

## 2023-09-20 RX ORDER — OXYCODONE HYDROCHLORIDE 10 MG/1
10 TABLET ORAL PRN
Status: DISCONTINUED | OUTPATIENT
Start: 2023-09-20 | End: 2023-09-20 | Stop reason: HOSPADM

## 2023-09-20 RX ORDER — GLYCOPYRROLATE 0.2 MG/ML
INJECTION INTRAMUSCULAR; INTRAVENOUS PRN
Status: DISCONTINUED | OUTPATIENT
Start: 2023-09-20 | End: 2023-09-20 | Stop reason: SDUPTHER

## 2023-09-20 RX ORDER — PROPOFOL 10 MG/ML
INJECTION, EMULSION INTRAVENOUS PRN
Status: DISCONTINUED | OUTPATIENT
Start: 2023-09-20 | End: 2023-09-20 | Stop reason: SDUPTHER

## 2023-09-20 RX ORDER — MIDAZOLAM HYDROCHLORIDE 1 MG/ML
INJECTION INTRAMUSCULAR; INTRAVENOUS
Status: COMPLETED
Start: 2023-09-20 | End: 2023-09-20

## 2023-09-20 RX ORDER — FENTANYL CITRATE 50 UG/ML
INJECTION, SOLUTION INTRAMUSCULAR; INTRAVENOUS PRN
Status: DISCONTINUED | OUTPATIENT
Start: 2023-09-20 | End: 2023-09-20 | Stop reason: SDUPTHER

## 2023-09-20 RX ORDER — BUPIVACAINE HYDROCHLORIDE AND EPINEPHRINE 5; 5 MG/ML; UG/ML
INJECTION, SOLUTION EPIDURAL; INTRACAUDAL; PERINEURAL
Status: COMPLETED | OUTPATIENT
Start: 2023-09-20 | End: 2023-09-20

## 2023-09-20 RX ORDER — LORAZEPAM 2 MG/ML
0.5 INJECTION INTRAMUSCULAR ONCE
Status: COMPLETED | OUTPATIENT
Start: 2023-09-20 | End: 2023-09-20

## 2023-09-20 RX ORDER — ONDANSETRON 2 MG/ML
4 INJECTION INTRAMUSCULAR; INTRAVENOUS
Status: DISCONTINUED | OUTPATIENT
Start: 2023-09-20 | End: 2023-09-20 | Stop reason: HOSPADM

## 2023-09-20 RX ORDER — BUPIVACAINE HYDROCHLORIDE 5 MG/ML
INJECTION, SOLUTION EPIDURAL; INTRACAUDAL
Status: COMPLETED | OUTPATIENT
Start: 2023-09-20 | End: 2023-09-20

## 2023-09-20 RX ORDER — OXYCODONE HYDROCHLORIDE 5 MG/1
5 TABLET ORAL PRN
Status: DISCONTINUED | OUTPATIENT
Start: 2023-09-20 | End: 2023-09-20 | Stop reason: HOSPADM

## 2023-09-20 RX ADMIN — Medication 120 MG: at 11:22

## 2023-09-20 RX ADMIN — HYDROMORPHONE HYDROCHLORIDE 0.5 MG: 1 INJECTION, SOLUTION INTRAMUSCULAR; INTRAVENOUS; SUBCUTANEOUS at 12:35

## 2023-09-20 RX ADMIN — GLYCOPYRROLATE 0.2 MG: 0.2 INJECTION INTRAMUSCULAR; INTRAVENOUS at 12:19

## 2023-09-20 RX ADMIN — Medication 1000 MG: at 11:30

## 2023-09-20 RX ADMIN — Medication 100 MCG: at 11:50

## 2023-09-20 RX ADMIN — PROPOFOL 150 MG: 10 INJECTION, EMULSION INTRAVENOUS at 11:22

## 2023-09-20 RX ADMIN — LIDOCAINE HYDROCHLORIDE 60 MG: 20 INJECTION, SOLUTION EPIDURAL; INFILTRATION; INTRACAUDAL; PERINEURAL at 12:17

## 2023-09-20 RX ADMIN — SUGAMMADEX 100 MG: 100 INJECTION, SOLUTION INTRAVENOUS at 12:17

## 2023-09-20 RX ADMIN — LORAZEPAM 0.5 MG: 2 INJECTION INTRAMUSCULAR; INTRAVENOUS at 13:33

## 2023-09-20 RX ADMIN — FENTANYL CITRATE 100 MCG: 50 INJECTION INTRAMUSCULAR; INTRAVENOUS at 11:22

## 2023-09-20 RX ADMIN — ROCURONIUM BROMIDE 50 MG: 10 INJECTION, SOLUTION INTRAVENOUS at 11:27

## 2023-09-20 RX ADMIN — LIDOCAINE HYDROCHLORIDE 100 MG: 20 INJECTION, SOLUTION EPIDURAL; INFILTRATION; INTRACAUDAL; PERINEURAL at 11:22

## 2023-09-20 RX ADMIN — DEXAMETHASONE SODIUM PHOSPHATE 8 MG: 4 INJECTION, SOLUTION INTRAMUSCULAR; INTRAVENOUS at 11:27

## 2023-09-20 RX ADMIN — CEFAZOLIN 2000 MG: 2 INJECTION, POWDER, FOR SOLUTION INTRAMUSCULAR; INTRAVENOUS at 11:27

## 2023-09-20 RX ADMIN — BUPIVACAINE HYDROCHLORIDE 30 ML: 5 INJECTION, SOLUTION EPIDURAL; INTRACAUDAL; PERINEURAL at 11:11

## 2023-09-20 RX ADMIN — SODIUM CHLORIDE: 9 INJECTION, SOLUTION INTRAVENOUS at 11:19

## 2023-09-20 RX ADMIN — ONDANSETRON 4 MG: 2 INJECTION INTRAMUSCULAR; INTRAVENOUS at 11:27

## 2023-09-20 RX ADMIN — SUGAMMADEX 100 MG: 100 INJECTION, SOLUTION INTRAVENOUS at 12:18

## 2023-09-20 RX ADMIN — HYDROMORPHONE HYDROCHLORIDE 0.5 MG: 1 INJECTION, SOLUTION INTRAMUSCULAR; INTRAVENOUS; SUBCUTANEOUS at 12:22

## 2023-09-20 RX ADMIN — MIDAZOLAM 2 MG: 1 INJECTION INTRAMUSCULAR; INTRAVENOUS at 11:12

## 2023-09-20 RX ADMIN — Medication 100 MCG: at 12:19

## 2023-09-20 ASSESSMENT — PAIN SCALES - GENERAL
PAINLEVEL_OUTOF10: 0

## 2023-09-20 ASSESSMENT — LIFESTYLE VARIABLES: SMOKING_STATUS: 0

## 2023-09-20 ASSESSMENT — PAIN - FUNCTIONAL ASSESSMENT: PAIN_FUNCTIONAL_ASSESSMENT: 0-10

## 2023-09-20 ASSESSMENT — PAIN DESCRIPTION - DESCRIPTORS: DESCRIPTORS: ACHING

## 2023-09-20 NOTE — H&P
Álvaro Kruse was seen and examined. No interval changes. Left shoulder marked. Risk, benefits and alternatives to surgery were discussed at length and patient and family ready to proceed with left shoulder arthroscopy with rotator cuff repair and bicep tenodesis.

## 2023-09-20 NOTE — FLOWSHEET NOTE
Patient hypertensive. Denies pain. Patient complains of feeling \"tense\" and \"unable to relax. \" Dr. Woody Mann notified. See orders.

## 2023-09-20 NOTE — DISCHARGE INSTR - DIET
Good nutrition is important when healing from an illness, injury, or surgery. Follow any nutrition recommendations given to you during your hospital stay. If you were given an oral nutrition supplement while in the hospital, continue to take this supplement at home. You can take it with meals, in-between meals, and/or before bedtime. These supplements can be purchased at most local grocery stores, pharmacies, and chain [employer]-stores. If you have any questions about your diet or nutrition, call the hospital and ask for the dietitian. Advance to regular diet as tolerated.

## 2023-09-20 NOTE — PROGRESS NOTES
Patient up to chair without issue.  will assist to dress. Scripts sent to retail pharmacy to be filled.

## 2023-09-20 NOTE — ANESTHESIA PROCEDURE NOTES
Peripheral Block    Patient location during procedure: pre-op  Reason for block: post-op pain management and at surgeon's request  Start time: 9/20/2023 11:11 AM  End time: 9/20/2023 11:11 AM  Staffing  Performed: anesthesiologist   Anesthesiologist: Laura Leiva MD  Performed by: Laura Leiva MD  Authorized by: Laura Leiva MD    Preanesthetic Checklist  Completed: patient identified, IV checked, site marked, risks and benefits discussed, surgical/procedural consents, equipment checked, pre-op evaluation, timeout performed, anesthesia consent given, oxygen available and monitors applied/VS acknowledged  Peripheral Block   Patient position: sitting  Prep: ChloraPrep  Patient monitoring: cardiac monitor, continuous pulse ox, frequent blood pressure checks and IV access  Block type: Brachial plexus  Interscalene  Laterality: left  Injection technique: single-shot  Guidance: ultrasound guided  Local infiltration: lidocaine  Infiltration strength: 1 %  Local infiltration: lidocaine  Dose: 3 mL    Needle   Needle gauge: 21 G  Needle localization: ultrasound guidance  Needle length: 10 cm  Assessment   Injection assessment: negative aspiration for heme, no paresthesia on injection and local visualized surrounding nerve on ultrasound  Paresthesia pain: none  Slow fractionated injection: yes  Hemodynamics: stable    Additional Notes  Immediately prior to procedure a \"time out\" was called to verify the correct patient, allergies, laterality, procedure and equipment. Time out performed with  RN    Local Anesthetic: 0.5 %  Bupivacaine   Amount: 30 ml  in 5 ml increments after negative aspiration each time. Anterior scalene and middle scalene muscles, upper, middle and lower trunks of the brachial plexus are identified, the tip of the needle and the spread of the local anesthetic around the brachial plexus are visualized.  The Brachial plexus appeared to be anatomically normal and there were

## 2023-09-20 NOTE — BRIEF OP NOTE
Brief Postoperative Note      Patient: Katrina Mott  YOB: 1960  MRN: [id number]    Date of Procedure: 9/20/2023    Pre-Op Diagnosis Codes:     * Tear of left rotator cuff, unspecified tear extent, unspecified whether traumatic [M75.102]    Post-Op Diagnosis: Same       Procedure(s):  LEFT SHOULDER ARTHROSCOPY ROTATOR CUFF REPAIR BICEP TENODESIS    Surgeon(s):  Jes Aldana MD    Assistant:  Surgical Assistant: Deisy Angulo    Anesthesia: General    Estimated Blood Loss (mL): Minimal    Complications: None    Specimens:   * No specimens in log *    Implants:  * No implants in log *      Drains: * No LDAs found *    Findings: intrasubstance cuff tear, biceps tear      Electronically signed by Jes Aldana MD on 9/20/2023 at 12:19 PM

## 2023-09-21 ENCOUNTER — OFFICE VISIT (OUTPATIENT)
Dept: ORTHOPEDIC SURGERY | Age: 63
End: 2023-09-21

## 2023-09-21 ENCOUNTER — HOSPITAL ENCOUNTER (OUTPATIENT)
Dept: PHYSICAL THERAPY | Age: 63
Setting detail: THERAPIES SERIES
Discharge: HOME | End: 2023-09-21
Payer: COMMERCIAL

## 2023-09-21 ENCOUNTER — TELEPHONE (OUTPATIENT)
Dept: ORTHOPEDIC SURGERY | Age: 63
End: 2023-09-21

## 2023-09-21 VITALS — BODY MASS INDEX: 28.7 KG/M2 | WEIGHT: 152 LBS | HEIGHT: 61 IN

## 2023-09-21 DIAGNOSIS — M75.102 TEAR OF LEFT ROTATOR CUFF, UNSPECIFIED TEAR EXTENT, UNSPECIFIED WHETHER TRAUMATIC: Primary | ICD-10-CM

## 2023-09-21 PROCEDURE — 99024 POSTOP FOLLOW-UP VISIT: CPT | Performed by: ORTHOPAEDIC SURGERY

## 2023-09-21 PROCEDURE — 97112 NEUROMUSCULAR REEDUCATION: CPT | Performed by: PHYSICAL THERAPIST

## 2023-09-21 PROCEDURE — 97161 PT EVAL LOW COMPLEX 20 MIN: CPT | Performed by: PHYSICAL THERAPIST

## 2023-09-21 PROCEDURE — 97110 THERAPEUTIC EXERCISES: CPT | Performed by: PHYSICAL THERAPIST

## 2023-09-21 NOTE — OP NOTE
[address], [address]                                OPERATIVE REPORT    PATIENT NAME: Kali Patterson                 :        1960  MED REC NO:   [id number]                          ROOM:  ACCOUNT NO:   [de-identified]                           ADMIT DATE: 2023  PROVIDER:     Ashley Sierra MD    DATE OF PROCEDURE:  2023    PREOPERATIVE DIAGNOSIS:  Left shoulder rotator cuff tear and biceps  tendonitis. POSTOPERATIVE DIAGNOSIS:  Left shoulder rotator cuff tear and biceps  tendonitis. OPERATION PERFORMED:  1. Arthroscopy of the left shoulder, extensive intraarticular  debridement including biceps tenotomy and debridement of labrum. 2.  Arthroscopy of the left shoulder with rotator cuff repair. 3.  Open subpectoral biceps tenodesis. SURGEON:  Ashley Sierra MD    FIRST ASSISTANT:  Dr. Gagan Gunderson. SECOND ASSISTANT:  Mr. Barrington Marvin. ANESTHESIA:  Interscalene nerve block plus general.    ANTIBIOTICS:  Ancef. ESTIMATED BLOOD LOSS:  Minimal.    COMPLICATIONS:  None apparent. INDICATIONS FOR PROCEDURE:  The patient had refractory left shoulder  pain despite injections and therapy. MRI scan showed a small  full-thickness tear. She was indicated for surgery. Understanding the  risks, benefits and alternatives of the operation, she was eager to  proceed. OPERATIVE PROCEDURE:  The patient was identified in the preop holding  area. Informed consent obtained. Operative site of the left shoulder  was marked by me with my initials. She was given a preop block, brought  to the operating room, placed under anesthesia. Examination of the left  shoulder under anesthesia revealed stable complete motion. She was  placed in a beach-chair position. SCDs were placed. All bony  prominences were padded.   Left upper extremity was then prepped and  draped in standard sterile fashion with

## 2023-09-21 NOTE — TELEPHONE ENCOUNTER
Medical Facility Question     Facility Name: [facility] Name: Arturo Santoro Number: [phone]  Request or Information: REQUESTING A CALL [facility] [address] #[phone]

## 2023-09-21 NOTE — PROGRESS NOTES
Shantal An returns today 1 day status post left shoulder arthroscopy with rotator cuff repair and bicep tenodesis. Retear was a bit atypical and that it was an intrasubstance of the tendon as opposed to Ollie osseous. We did side-to-side repair. Today, pain is reasonably well controlled. Incisions look good without infection. I changed her bandages, once the area with ChloraPrep and placed new Steri-Strips and Tegaderm. She will start therapy today.   Follow-up with me in a week for suture removal.

## 2023-09-21 NOTE — FLOWSHEET NOTE
[address] [place] [address]  Phone: [phone]   Fax: [phone]    Physical Therapy Treatment Note/ Progress Report:           Date:  2023    Patient Name:  Kali Patterson    :  1960  MRN: [id number]  Restrictions/Precautions:    Medical/Treatment Diagnosis Information:  Diagnosis: S/P L RCR and subpectoral biceps tenodesis -  - M75.102  Treatment Diagnosis: decreased ADL status, ROM, strength, and high-level iADLs  Insurance/Certification information:  PT Insurance Information: [place]  Physician Information:  Grisel Lin MD  Has the plan of care been signed (Y/N):        []  Yes  [x]  No     Date of Patient follow up with Physician: 2023      Is this a Progress Report:     []  Yes  [x]  No          Progress report/ Recertification will be due (10 Rx or 30 days whichever is less): 2023         Visit # Insurance Allowable Auth Required   1 15 []  Yes []  No        Functional Scale:   UEFI   - 100% limitation       Latex Allergy:  [x]NO      []YES  Preferred Language for Healthcare:   [x]English       []other:      Pain level:  0/10     SUBJECTIVE:  See eval     OBJECTIVE: See eval   Observation:   Test measurements:      RESTRICTIONS/PRECAUTIONS: MD protocol; flex 100 deg, ER 60 deg  for 6 weeks     Exercises/Interventions:   Exercise/Equipment Resistance/Repetitions Other comments   Stretching/PROM     Wand     Table Slides  NV   UE [place]     Pulleys     Pendulum hang 10 sec x 10  Added         Isometrics     Retraction 10 sec x 10  Added     2 min Added    Weight shift     Flexion     Abduction     External Rotation     Internal Rotation     Biceps     Triceps          PRE's     Flexion     Abduction     External Rotation     Internal Rotation     Shrugs     EXT     Reverse Flys     Serratus     Horizontal Abd with ER     Biceps     Triceps

## 2023-09-21 NOTE — PLAN OF CARE
time.      Co-morbidities/Complexities (which will affect course of rehabilitation):   []None           Arthritic conditions   []Rheumatoid arthritis (M05.9)  [x]Osteoarthritis (M19.91)   Cardiovascular conditions   []Hypertension (I10)  []Hyperlipidemia (E78.5)  []Angina pectoris (I20)  []Atherosclerosis (I70)   Musculoskeletal conditions   []Disc pathology   []Congenital spine pathologies   []Prior surgical intervention  []Osteoporosis (M81.8)  []Osteopenia (M85.8)   Endocrine conditions   []Hypothyroid (E03.9)  []Hyperthyroid Gastrointestinal conditions   []Constipation (B63.94)   Metabolic conditions   []Morbid obesity (E66.01)  []Diabetes type 1(E10.65) or 2 (E11.65)   []Neuropathy (G60.9)     Pulmonary conditions   []Asthma (J45)  []Coughing   []COPD (J44.9)   Psychological Disorders  [x]Anxiety (F41.9)  []Depression (F32.9)   []Other:   []Other:     H/o of cervical myofascial pain     Barriers to/and or personal factors that will affect rehab potential:              []Age  []Sex              []Motivation/Lack of Motivation                        []Co-Morbidities              []Cognitive Function, education/learning barriers              []Environmental, home barriers              []profession/work barriers  [x]past PT/medical experience  []other:  Justification: Pt has a history of chronic cervical pain, which may affect rehab potential for LUE. Falls Risk Assessment (30 days):   [x] Falls Risk assessed and no intervention required.   [] Falls Risk assessed and Patient requires intervention due to being higher risk   TUG score (>12s at risk):     [] Falls education provided, including           ASSESSMENT:   Functional Impairments   []Noted spinal or UE joint hypomobility   []Noted spinal or UE joint hypermobility   [x]Decreased UE functional ROM   [x]Decreased UE functional strength   []Abnormal reflexes/sensation/myotomal/dermatomal deficits   [x]Decreased RC/scapular/core strength and neuromuscular

## 2023-09-22 ENCOUNTER — TELEPHONE (OUTPATIENT)
Dept: ORTHOPEDIC SURGERY | Age: 63
End: 2023-09-22

## 2023-09-22 NOTE — TELEPHONE ENCOUNTER
Patient left message complaining of left shoulder pain. Called and spoke with patient. She states that the Percocet is not helping and she is getting relief from Advil. She has stopped the Percocet and is asking if she can take Tylenol as needed in between the Advil. Patient ok to take Advil with Tylenol for breakthrough pain. She can ice 15-20 minutes every hour as needed for pain. Explained that she can reach us tomorrow between 8:00-9:30 at the office if needed. Patient verbalized understanding.

## 2023-09-28 ENCOUNTER — OFFICE VISIT (OUTPATIENT)
Dept: ORTHOPEDIC SURGERY | Age: 63
End: 2023-09-28

## 2023-09-28 ENCOUNTER — HOSPITAL ENCOUNTER (OUTPATIENT)
Dept: PHYSICAL THERAPY | Age: 63
Setting detail: THERAPIES SERIES
Discharge: HOME | End: 2023-09-28
Payer: COMMERCIAL

## 2023-09-28 VITALS — BODY MASS INDEX: 28.72 KG/M2 | HEIGHT: 61 IN

## 2023-09-28 DIAGNOSIS — M75.42 IMPINGEMENT SYNDROME OF LEFT SHOULDER: ICD-10-CM

## 2023-09-28 DIAGNOSIS — M75.102 TEAR OF LEFT ROTATOR CUFF, UNSPECIFIED TEAR EXTENT, UNSPECIFIED WHETHER TRAUMATIC: Primary | ICD-10-CM

## 2023-09-28 PROCEDURE — 99024 POSTOP FOLLOW-UP VISIT: CPT | Performed by: ORTHOPAEDIC SURGERY

## 2023-09-28 PROCEDURE — 97110 THERAPEUTIC EXERCISES: CPT | Performed by: PHYSICAL THERAPIST

## 2023-09-28 NOTE — PROGRESS NOTES
Gio Gonzalez returns today 1 week status post left shoulder rotator cuff repair and biceps tenodesis. She is doing well. Pain is well-managed. Today, I removed her sutures and placed new Steri-Strips and Tegaderm. Incisions look good without infection. Neurologic and vascular exams of the left upper extremity are intact. She will continue with her rehab and follow-up with me in 5 weeks.

## 2023-09-28 NOTE — FLOWSHEET NOTE
[address] [place] [address]  Phone: [phone]   Fax: [phone]    Physical Therapy Treatment Note/ Progress Report:           Date:  2023    Patient Name:  Artem Tovar    :  1960  MRN: [id number]  Restrictions/Precautions:    Medical/Treatment Diagnosis Information:  Diagnosis: S/P L RCR and subpectoral biceps tenodesis -  - M75.102  Treatment Diagnosis: decreased ADL status, ROM, strength, and high-level iADLs  Insurance/Certification information:  PT Insurance Information: [place]  Physician Information:  Hood Keen MD  Has the plan of care been signed (Y/N):        []  Yes  [x]  No     Date of Patient follow up with Physician: 2023      Is this a Progress Report:     []  Yes  [x]  No          Progress report/ Recertification will be due (10 Rx or 30 days whichever is less): 2023         Visit # Insurance Allowable Auth Required   2 15 []  Yes []  No        Functional Scale:   UEFI   - 100% limitation       Latex Allergy:  [x]NO      []YES  Preferred Language for Healthcare:   [x]English       []other:      Pain level:  0 /10     SUBJECTIVE:  States she she is sleeping good.  She states she is taking an occasional advil or tylenol and ice.      OBJECTIVE: See eval   Observation:   Test measurements:      RESTRICTIONS/PRECAUTIONS: MD protocol; flex 100 deg, ER 60 deg  for 6 weeks     Exercises/Interventions:   Exercise/Equipment Resistance/Repetitions Other comments   Stretching/PROM     Wand     Table Slides 10 sec x 10 flex  Scap Added   NV   ER cane  NV   Pulleys     Pendulum hang 10 sec x 10  Added         Isometrics     Retraction 10 sec x 10  Added     2 min Added    Weight shift     Flexion     Abduction     External Rotation     Internal Rotation     Biceps     Triceps          PRE's     Flexion     Abduction     External Rotation

## 2023-10-04 ENCOUNTER — TELEPHONE (OUTPATIENT)
Dept: ORTHOPEDIC SURGERY | Age: 63
End: 2023-10-04

## 2023-10-04 NOTE — TELEPHONE ENCOUNTER
Patient calling to ask for return to work from home on 10-12-23 with follow up appointment on 11-2-23 on note. She is a . Dr. Nancy Graves is in surgery today. I will check with him tomorrow and then let Lakshmi know.

## 2023-10-12 NOTE — TELEPHONE ENCOUNTER
Spoke with Demetrius Boss, she needs medical records faxed with documentation that the pt needs the brand Prevacid. I let her know we have faxed medical records several times to the appeals dept. Demetrius Boss was able to find the records that were previously faxed. If anything else is needed Isaiah Campos will contact the office. Dispense: 90 tablet; Refill: 0  - gabapentin (NEURONTIN) 300 MG capsule; Take 1 capsule by mouth 2 times daily for 30 days. Intended supply: 90 days  Dispense: 60 capsule; Refill: 0    2. Primary hypertension    3. S/P total knee arthroplasty, bilateral  - clindamycin (CLEOCIN) 300 MG capsule; Take 1 capsule by mouth 2 times daily for 3 days Start the day prior to dental procedure. Dispense: 6 capsule; Refill: 0    4. Neuropathy, sciatic, right  - improving      - Neck pain, ddd. Planning to have cervical MRI through [facility]  - Will start Mobic 15 mg QD  - gabapentin 300 mg BID  - Continue PRN methocarbamol, advised to space out gabapentin and methocarbamol by a few hours  - BP controlled  - clindamycin for prophylaxis for upcoming dental procedure. An electronic signature was used to authenticate this note.     --LUIS Duvall - CNP on 10/12/2023 at 9:48 AM

## 2023-10-13 ENCOUNTER — HOSPITAL ENCOUNTER (OUTPATIENT)
Dept: PHYSICAL THERAPY | Age: 63
Setting detail: THERAPIES SERIES
Discharge: HOME | End: 2023-10-13
Attending: ORTHOPAEDIC SURGERY
Payer: COMMERCIAL

## 2023-10-13 PROCEDURE — 97110 THERAPEUTIC EXERCISES: CPT | Performed by: PHYSICAL THERAPIST

## 2023-10-13 PROCEDURE — 97530 THERAPEUTIC ACTIVITIES: CPT | Performed by: PHYSICAL THERAPIST

## 2023-10-13 NOTE — FLOWSHEET NOTE
[address] [place] [address]  Phone: [phone]   Fax: [phone]    Physical Therapy Treatment Note/ Progress Report:           Date:  10/13/2023    Patient Name:  Roseline Thompson    :  1960  MRN: [id number]  Restrictions/Precautions:    Medical/Treatment Diagnosis Information:  Diagnosis: S/P L RCR and subpectoral biceps tenodesis -  - M75.102  Treatment Diagnosis: decreased ADL status, ROM, strength, and high-level iADLs  Insurance/Certification information:  PT Insurance Information: [place]  Physician Information:  Flo Ferrera MD  Has the plan of care been signed (Y/N):        [x]  Yes  []  No     Date of Patient follow up with Physician: 2023      Is this a Progress Report:     []  Yes  [x]  No          Progress report/ Recertification will be due (10 Rx or 30 days whichever is less): 2023         Visit # Insurance Allowable Auth Required   3 12 + 100 on secondary per pt reports []  Yes []  No        Functional Scale:   UEFI   - 100% limitation       Latex Allergy:  [x]NO      []YES  Preferred Language for Healthcare:   [x]English       []other:      Pain level:  0 /10     SUBJECTIVE:  Pt is 3.5 weeks post op. Pt went to back to work yesterday- wasn't too bad. Does computer work. Got tired about lunch time. Exercises going well.      OBJECTIVE: See eval   Observation:   Test measurements:           ROM PROM AROM     L-10/13 R L R   Flexion 100 table slides     Not assessed per recent surgery   Abduction       Not assessed per recent surgery   ER  20 supine     Not assessed per recent surgery   IR       Not assessed per recent surgery   Other(cervical)           Other               RESTRICTIONS/PRECAUTIONS: MD protocol; flex 100 deg, ER 60 deg  for 6 weeks     Exercises/Interventions:   Exercise/Equipment Resistance/Repetitions Other comments   Stretching/PROM     Wand

## 2023-10-27 ENCOUNTER — HOSPITAL ENCOUNTER (OUTPATIENT)
Dept: PHYSICAL THERAPY | Age: 63
Setting detail: THERAPIES SERIES
Discharge: HOME | End: 2023-10-27
Attending: ORTHOPAEDIC SURGERY
Payer: COMMERCIAL

## 2023-10-27 PROCEDURE — 97140 MANUAL THERAPY 1/> REGIONS: CPT | Performed by: PHYSICAL THERAPIST

## 2023-10-27 PROCEDURE — 97110 THERAPEUTIC EXERCISES: CPT | Performed by: PHYSICAL THERAPIST

## 2023-11-02 ENCOUNTER — OFFICE VISIT (OUTPATIENT)
Dept: ORTHOPEDIC SURGERY | Age: 63
End: 2023-11-02

## 2023-11-02 ENCOUNTER — HOSPITAL ENCOUNTER (OUTPATIENT)
Dept: PHYSICAL THERAPY | Age: 63
Setting detail: THERAPIES SERIES
Discharge: HOME | End: 2023-11-02
Attending: ORTHOPAEDIC SURGERY
Payer: COMMERCIAL

## 2023-11-02 VITALS — RESPIRATION RATE: 12 BRPM | WEIGHT: 152 LBS | HEIGHT: 61 IN | BODY MASS INDEX: 28.7 KG/M2

## 2023-11-02 DIAGNOSIS — M75.42 IMPINGEMENT SYNDROME OF LEFT SHOULDER: ICD-10-CM

## 2023-11-02 DIAGNOSIS — M75.102 TEAR OF LEFT ROTATOR CUFF, UNSPECIFIED TEAR EXTENT, UNSPECIFIED WHETHER TRAUMATIC: Primary | ICD-10-CM

## 2023-11-02 DIAGNOSIS — M67.912 TENDINOPATHY OF LEFT ROTATOR CUFF: ICD-10-CM

## 2023-11-02 PROCEDURE — 99024 POSTOP FOLLOW-UP VISIT: CPT | Performed by: ORTHOPAEDIC SURGERY

## 2023-11-02 PROCEDURE — 97140 MANUAL THERAPY 1/> REGIONS: CPT | Performed by: PHYSICAL THERAPIST

## 2023-11-02 PROCEDURE — 97110 THERAPEUTIC EXERCISES: CPT | Performed by: PHYSICAL THERAPIST

## 2023-11-02 NOTE — PLAN OF CARE
patients Functional Deficits. [x] Progressing: [] Met: [x] Not Met: [] Adjusted    Long Term Goals: To be achieved in: 24-28 weeks  1. Disability index score of 50% or less for the UEFI to assist with reaching prior level of function. [x] Progressing: [] Met: [x] Not Met: [] Adjusted  2. Patient will demonstrate an increase in L shoulder (flex, ABD, ER, and IR) strength to 4/5 to allow for proper functional mobility as indicated by patients Functional Deficits. [x] Progressing: [] Met: [x] Not Met: [] Adjusted  3. Patient will return to ADLs functional activities without increased symptoms or restriction. [x] Progressing: [] Met: [x] Not Met: [] Adjusted  4. Patient will report doing independent pilates pain free. [x] Progressing: [] Met: [x] Not Met: [] Adjusted       Overall Progression Towards Functional goals/ Treatment Progress Update:  [x] Patient is progressing as expected towards functional goals listed. [] Progression is slowed due to complexities/Impairments listed. [] Progression has been slowed due to co-morbidities. [] Plan just implemented, too soon to assess goals progression <30days   [] Goals require adjustment due to lack of progress  [] Patient is not progressing as expected and requires additional follow up with physician  [] Other    Prognosis for POC: [x] Good [] Fair  [] Poor      Patient requires continued skilled intervention: [x] Yes  [] No      Treatment/Activity Tolerance:  [x] Patient tolerated treatment well [] Patient limited by fatique  [] Patient limited by pain  [] Patient limited by other medical complications  [x] Other: Pt chrystal session well. She chrystal the addition of shoulder pulleys and IR stretching. She did report min discomfort at end range. Pt educated on getting the pulleys for home for HEP. She required VCs to decrease muscle guarding with manual stretching.  11/2    Patient education: Patient education on PT and plan of care including diagnosis, prognosis,

## 2023-11-02 NOTE — PROGRESS NOTES
Sreekanth Singh today 6-week status post left shoulder scope with rotator cuff repair and bicep tenodesis. Surgery was performed September 20, 2023. Her main concern today is that she feels like there is an abnormal odor from her axilla. Today, incisions are healed perfectly. Certainly no evidence of infection in her arthroscopic incisions or bicep tenodesis incision. There is no warmth erythema or rash in the axilla and I cannot appreciate any abnormality there. My opinion that odor she may be smelling may be emanating from the fact that she has not been moving her arm much over the last 6 weeks and she has been in a sling. She was reassured by that explanation. Strength is better than antigravity in all planes. She can forward elevate to 90 degrees but external rotation is a bit tight at 20. At this point she can begin to wean from her sling using it for comfort if needed. She will continue with rehab and follow-up with me in 6 more weeks.

## 2023-11-06 ENCOUNTER — HOSPITAL ENCOUNTER (OUTPATIENT)
Dept: PHYSICAL THERAPY | Age: 63
Setting detail: THERAPIES SERIES
Discharge: HOME | End: 2023-11-06
Attending: ORTHOPAEDIC SURGERY
Payer: COMMERCIAL

## 2023-11-06 PROCEDURE — 97140 MANUAL THERAPY 1/> REGIONS: CPT

## 2023-11-06 PROCEDURE — 97110 THERAPEUTIC EXERCISES: CPT

## 2023-11-06 NOTE — FLOWSHEET NOTE
session well. She chrystal the addition of shoulder pulleys and IR stretching. She did report min discomfort at end range. Pt educated on getting the pulleys for home for HEP. She required VCs to decrease muscle guarding with manual stretching. 11/2 11/6: Patient tolerated session well. Progressed shrugs and biceps curls to 2#. Cues to avoid guarding arm against her side now that she is out of the sling. Proximal biceps with hard, dense, and uncomfortable edema. Was able to soften about 30% of the area with STM while doing passive stretching. Educated patient on STM to incisions where dimpling is present    Patient education: Patient education on PT and plan of care including diagnosis, prognosis, treatment goals and options. Patient agrees with discussed POC and treatment and is aware of rehab process. 9/21      PLAN: 1-2x/week for 12 weeks (10/27/23)   manual PROM  [x] Continue per plan of care [] Alter current plan (see comments above)  [] Plan of care initiated [] Hold pending MD visit [] Discharge      Electronically signed by:  Terell Villela PTA     Note: If patient does not return for scheduled/ recommended follow up visits, this note will serve as a discharge from care along with most recent update on progress.

## 2023-11-10 ENCOUNTER — HOSPITAL ENCOUNTER (OUTPATIENT)
Dept: PHYSICAL THERAPY | Age: 63
Setting detail: THERAPIES SERIES
Discharge: HOME | End: 2023-11-10
Attending: ORTHOPAEDIC SURGERY
Payer: COMMERCIAL

## 2023-11-10 PROCEDURE — 97140 MANUAL THERAPY 1/> REGIONS: CPT | Performed by: PHYSICAL THERAPIST

## 2023-11-10 PROCEDURE — 97110 THERAPEUTIC EXERCISES: CPT | Performed by: PHYSICAL THERAPIST

## 2023-11-13 ENCOUNTER — HOSPITAL ENCOUNTER (OUTPATIENT)
Dept: PHYSICAL THERAPY | Age: 63
Setting detail: THERAPIES SERIES
Discharge: HOME | End: 2023-11-13
Attending: ORTHOPAEDIC SURGERY
Payer: COMMERCIAL

## 2023-11-13 PROCEDURE — 97140 MANUAL THERAPY 1/> REGIONS: CPT | Performed by: PHYSICAL THERAPIST

## 2023-11-13 PROCEDURE — 97110 THERAPEUTIC EXERCISES: CPT | Performed by: PHYSICAL THERAPIST

## 2023-11-13 NOTE — FLOWSHEET NOTE
[address] [place] [address] and Therapy [address] [place] [address]., Rashad reddy, [facility] office: [phone] fax: [phone]    Physical Therapy Treatment Note/ Progress Report:           Date:  2023    Patient Name:  Belia Springer    :  1960  MRN: [id number]  Restrictions/Precautions:    Medical/Treatment Diagnosis Information:  Diagnosis: S/P L RCR and subpectoral biceps tenodesis -  - M75.102  Treatment Diagnosis: decreased ADL status, ROM, strength, and high-level iADLs  Insurance/Certification information:  PT Insurance Information: [place]  Physician Information:  Davis Jamison MD  Has the plan of care been signed (Y/N):        [x]  Yes  []  No     Date of Patient follow up with Physician: 2023      Is this a Progress Report:     [x]  Yes  []  No          Progress report/ Recertification will be due (10 Rx or 30 days whichever is less): 23        Visit # Insurance Allowable Auth Required   8 15 + 100 on secondary per pt reports []  Yes []  No        Functional Scale:   UEFI  921 - 100% limitation         Latex Allergy:  [x]NO      []YES  Preferred Language for Healthcare:   [x]English       []other:      Pain level:  0-1/10     SUBJECTIVE:  7.5 weeks post op. Pt did not sleep well last night she thinks because she did not take her supplements that are for inflammation. She took them this morning and feeling better. Has been doing ER stretches more like 3x/day now but still stiff in this direction. OBJECTIVE:   Observation:   Test measurements:  10/27/23         ROM PROM AROM     L- R L R   Flexion 135 pulleys     Not assessed per recent surgery   Abduction  163 pulleys in some scaption     Not assessed per recent surgery   ER  16 supine at side;   18 At 50 abd; 32 at 80 abd     Not assessed per recent surgery   IR       Not assessed per recent surgery   Other(cervical)           Other             Incision: healing well with no signs of infection.  Is

## 2023-11-16 ENCOUNTER — HOSPITAL ENCOUNTER (OUTPATIENT)
Dept: PHYSICAL THERAPY | Age: 63
Setting detail: THERAPIES SERIES
Discharge: HOME | End: 2023-11-16
Attending: ORTHOPAEDIC SURGERY
Payer: COMMERCIAL

## 2023-11-16 PROCEDURE — 97140 MANUAL THERAPY 1/> REGIONS: CPT | Performed by: PHYSICAL THERAPIST

## 2023-11-16 PROCEDURE — 97110 THERAPEUTIC EXERCISES: CPT | Performed by: PHYSICAL THERAPIST

## 2023-11-16 NOTE — FLOWSHEET NOTE
[address] [place] [address] and Therapy [address] [place] [address] Avenue., Jeff Caban, [facility] office: [phone] fax: [phone]    Physical Therapy Treatment Note/ Progress Report:           Date:  2023    Patient Name:  Selin Beasley    :  1960  MRN: [id number]  Restrictions/Precautions:    Medical/Treatment Diagnosis Information:  Diagnosis: S/P L RCR and subpectoral biceps tenodesis -  - M75.102  Treatment Diagnosis: decreased ADL status, ROM, strength, and high-level iADLs  Insurance/Certification information:  PT Insurance Information: [place]  Physician Information:  John Romero MD  Has the plan of care been signed (Y/N):        [x]  Yes  []  No     Date of Patient follow up with Physician: 2023      Is this a Progress Report:     [x]  Yes  []  No          Progress report/ Recertification will be due (10 Rx or 30 days whichever is less): 23        Visit # Insurance Allowable Auth Required   9 15 + 100 on secondary per pt reports []  Yes []  No        Functional Scale:   UEFI   - 100% limitation         Latex Allergy:  [x]NO      []YES  Preferred Language for Healthcare:   [x]English       []other:      Pain level:  0-1/10     SUBJECTIVE:  8 weeks post op. Pt could not sleep Monday night after last session and woke up Tuesday really sore. However since then she feels like she has better motion and has loosened up. She is able to reach more at work with less pain. Also able to sleep in more positions and better the last couple nights.    OBJECTIVE:   Observation:   Test measurements:  10/27/23         ROM PROM AROM     L- R L R   Flexion 135 pulleys     Not assessed per recent surgery   Abduction  163 pulleys in some scaption     Not assessed per recent surgery   ER  16 supine at side;   18 At 50 abd; 32 at 80 abd     Not assessed per recent surgery   IR       Not assessed per recent surgery   Other(cervical)           Other             Incision: healing

## 2023-11-21 ENCOUNTER — APPOINTMENT (OUTPATIENT)
Dept: PHYSICAL THERAPY | Age: 63
End: 2023-11-21
Attending: ORTHOPAEDIC SURGERY
Payer: COMMERCIAL

## 2023-11-24 ENCOUNTER — HOSPITAL ENCOUNTER (OUTPATIENT)
Dept: PHYSICAL THERAPY | Age: 63
Setting detail: THERAPIES SERIES
Discharge: HOME | End: 2023-11-24
Attending: ORTHOPAEDIC SURGERY
Payer: COMMERCIAL

## 2023-11-24 PROCEDURE — 97140 MANUAL THERAPY 1/> REGIONS: CPT | Performed by: PHYSICAL THERAPIST

## 2023-11-24 PROCEDURE — 97110 THERAPEUTIC EXERCISES: CPT | Performed by: PHYSICAL THERAPIST

## 2023-11-24 NOTE — FLOWSHEET NOTE
[address] [place] [address] and Therapy [address] [place] [address]., Rashad reddy, [address] [place] [address] office: [phone] fax: [phone]    Physical Therapy Treatment Note/ Progress Report:           Date:  2023    Patient Name:  Christina Carey    :  1960  MRN: [id number]  Restrictions/Precautions:    Medical/Treatment Diagnosis Information:  Diagnosis: S/P L RCR and subpectoral biceps tenodesis -  - M75.102  Treatment Diagnosis: decreased ADL status, ROM, strength, and high-level iADLs  Insurance/Certification information:  PT Insurance Information: [place]  Physician Information:  Sid Jerez MD  Has the plan of care been signed (Y/N):        [x]  Yes  []  No     Date of Patient follow up with Physician: 2023      Is this a Progress Report:     [x]  Yes  []  No          Progress report/ Recertification will be due (10 Rx or 30 days whichever is less): 23        Visit # Insurance Allowable Auth Required   10 15 + 100 on secondary per pt reports []  Yes []  No        Functional Scale:   UEFI   - 100% limitation         Latex Allergy:  [x]NO      []YES  Preferred Language for Healthcare:   [x]English       []other:      Pain level:  0-1/10     SUBJECTIVE:  9 weeks post op. Pt says she has good days and bad days. Bad days usually after she does more. She got a massage and was told she has a knot in her back that was worked out and sore but got tight again a couple days later. Feels like she is able to reach further with ADLs with L UE throughout day. She has been able to sleep flat last couple days.    OBJECTIVE:   Observation:   Test measurements:  10/27/23         ROM PROM AROM     L- R L R   Flexion 135 pulleys     Not assessed per recent surgery   Abduction  163 pulleys in some scaption     Not assessed per recent surgery   ER  40 supine at side;    29 at 90 abd     Not assessed per recent surgery   IR       Not assessed per recent surgery   Other(cervical)

## 2023-11-27 ENCOUNTER — HOSPITAL ENCOUNTER (OUTPATIENT)
Dept: PHYSICAL THERAPY | Age: 63
Setting detail: THERAPIES SERIES
Discharge: HOME | End: 2023-11-27
Attending: ORTHOPAEDIC SURGERY
Payer: COMMERCIAL

## 2023-11-27 PROCEDURE — 97140 MANUAL THERAPY 1/> REGIONS: CPT | Performed by: PHYSICAL THERAPIST

## 2023-11-27 PROCEDURE — 97110 THERAPEUTIC EXERCISES: CPT | Performed by: PHYSICAL THERAPIST

## 2023-11-27 NOTE — PLAN OF CARE
POC and treatment and is aware of rehab process. 9/21      PLAN: 1-2x/week for 8 weeks (11/27/23)   manual PROM and STM  [x] Continue per plan of care [] Alter current plan (see comments above)  [] Plan of care initiated [] Hold pending MD visit [] Discharge      Electronically signed by:  Flora Kitchen, PT, DPT [id number]    Note: If patient does not return for scheduled/ recommended follow up visits, this note will serve as a discharge from care along with most recent update on progress.

## 2023-11-30 ENCOUNTER — HOSPITAL ENCOUNTER (OUTPATIENT)
Dept: PHYSICAL THERAPY | Age: 63
Setting detail: THERAPIES SERIES
Discharge: HOME | End: 2023-11-30
Attending: ORTHOPAEDIC SURGERY
Payer: COMMERCIAL

## 2023-11-30 PROCEDURE — 97110 THERAPEUTIC EXERCISES: CPT | Performed by: PHYSICAL THERAPIST

## 2023-11-30 PROCEDURE — 97140 MANUAL THERAPY 1/> REGIONS: CPT | Performed by: PHYSICAL THERAPIST

## 2023-11-30 NOTE — FLOWSHEET NOTE
does have some stiffness post op especially in ER. She does report tightness  and spasm in different muscles with manual stretching so have been working on manual STM while performing manual PROM in sessions. Was able to get pt further with manual stretching compared to self stretching so this did improve compared to last visit and has been slowly improving each week. Her AROM is limited by her poor strength consistent with time post op. Pt will continue to benefit from skilled PT to progress her shoulder PROM, AAROM, AROM and strength per protocol to improve her functional reach for ADLs. 11/30: Pt did have improved ROM on pulleys and with manual stretching today. Did spend more time on STM to help with her tightness and trigger/tender points throughout the shoulder. She did improve in ER ROM after this. Patient education: Patient education on PT and plan of care including diagnosis, prognosis, treatment goals and options. Patient agrees with discussed POC and treatment and is aware of rehab process. 9/21      PLAN: 1-2x/week for 8 weeks (11/27/23)   manual PROM and STM  [x] Continue per plan of care [] Alter current plan (see comments above)  [] Plan of care initiated [] Hold pending MD visit [] Discharge      Electronically signed by:  Lex Snow, PT, DPT [id number]    Note: If patient does not return for scheduled/ recommended follow up visits, this note will serve as a discharge from care along with most recent update on progress.

## 2023-12-04 ENCOUNTER — APPOINTMENT (OUTPATIENT)
Dept: PHYSICAL THERAPY | Age: 63
End: 2023-12-04
Attending: ORTHOPAEDIC SURGERY
Payer: COMMERCIAL

## 2023-12-05 ENCOUNTER — HOSPITAL ENCOUNTER (OUTPATIENT)
Dept: PHYSICAL THERAPY | Age: 63
Setting detail: THERAPIES SERIES
Discharge: HOME | End: 2023-12-05
Attending: ORTHOPAEDIC SURGERY
Payer: COMMERCIAL

## 2023-12-05 PROCEDURE — 97140 MANUAL THERAPY 1/> REGIONS: CPT

## 2023-12-05 PROCEDURE — 97110 THERAPEUTIC EXERCISES: CPT

## 2023-12-05 NOTE — FLOWSHEET NOTE
well [] Patient limited by fatique  [] Patient limited by pain  [] Patient limited by other medical complications  [x] Other: 11/27: Pt does have some stiffness post op especially in ER. She does report tightness  and spasm in different muscles with manual stretching so have been working on manual STM while performing manual PROM in sessions. Was able to get pt further with manual stretching compared to self stretching so this did improve compared to last visit and has been slowly improving each week. Her AROM is limited by her poor strength consistent with time post op. Pt will continue to benefit from skilled PT to progress her shoulder PROM, AAROM, AROM and strength per protocol to improve her functional reach for ADLs. 12/5: Patient doing well with the exercises, but continues to struggle with radicular muscle pain and soreness. Continued STM with manual stretching for improved ROM and decreased muscle pain    Patient education: Patient education on PT and plan of care including diagnosis, prognosis, treatment goals and options. Patient agrees with discussed POC and treatment and is aware of rehab process. 9/21      PLAN: 1-2x/week for 8 weeks (11/27/23)   manual PROM and STM  [x] Continue per plan of care [] Alter current plan (see comments above)  [] Plan of care initiated [] Hold pending MD visit [] Discharge      Electronically signed by:  Eyad Santos PTA    Note: If patient does not return for scheduled/ recommended follow up visits, this note will serve as a discharge from care along with most recent update on progress.

## 2023-12-07 ENCOUNTER — HOSPITAL ENCOUNTER (OUTPATIENT)
Dept: PHYSICAL THERAPY | Age: 63
Setting detail: THERAPIES SERIES
Discharge: HOME | End: 2023-12-07
Attending: ORTHOPAEDIC SURGERY
Payer: COMMERCIAL

## 2023-12-07 PROCEDURE — 97140 MANUAL THERAPY 1/> REGIONS: CPT | Performed by: PHYSICAL THERAPIST

## 2023-12-07 PROCEDURE — 97110 THERAPEUTIC EXERCISES: CPT | Performed by: PHYSICAL THERAPIST

## 2023-12-07 NOTE — FLOWSHEET NOTE
[address] [place] [address] Road and Therapy [address] [place] [address]., [place] maureen, [address] [place] [address] office: [phone] fax: [phone]  Physical Therapy Treatment Note/ Progress Report:           Date:  2023    Patient Name:  Arina Nunez    :  1960  MRN: [id number]  Restrictions/Precautions:    Medical/Treatment Diagnosis Information:  Diagnosis: S/P L RCR and subpectoral biceps tenodesis -  - M75.102  Treatment Diagnosis: decreased ADL status, ROM, strength, and high-level iADLs  Insurance/Certification information:  PT Insurance Information: [place]  Physician Information:  Christina Gutierrez MD  Has the plan of care been signed (Y/N):        [x]  Yes  []  No     Date of Patient follow up with Physician: 2023      Is this a Progress Report:     [x]  Yes  []  No          Progress report/ Recertification will be due (10 Rx or 30 days whichever is less):23        Visit # Insurance Allowable Auth Required   14 15 + 100 on secondary per pt reports []  Yes []  No        Functional Scale:   UEFI   - 100% limitation  - 26.25% limitation         Latex Allergy:  [x]NO      []YES  Preferred Language for Healthcare:   [x]English       []other:      Pain level:  0-1/10 1    SUBJECTIVE:    : 11 weeks post op. Pt reports she drove to work yesterday and had a lot more pain in her arm when she got home. She also drove Friday to work which was sore. She says was more sore yesterday though. She also says her desk set-up is different at work vs her home set up. She had pain anterior, lateral and posterior shoulder. Her pain was as intense as it was when her nerve block wore off after surgery. Her shoulder was throbbing. She tried ice which made it worse. Then put some cream on it which helped. Was finally able to sleep after this. Feeling better today. Other than felt the same pain driving to PT today.       OBJECTIVE:   Observation:   Test measurements:           ROM PROM AROM-23

## 2023-12-11 ENCOUNTER — HOSPITAL ENCOUNTER (OUTPATIENT)
Dept: PHYSICAL THERAPY | Age: 63
Setting detail: THERAPIES SERIES
Discharge: HOME | End: 2023-12-11
Attending: ORTHOPAEDIC SURGERY
Payer: COMMERCIAL

## 2023-12-11 PROCEDURE — 97110 THERAPEUTIC EXERCISES: CPT

## 2023-12-11 PROCEDURE — 97140 MANUAL THERAPY 1/> REGIONS: CPT

## 2023-12-11 NOTE — FLOWSHEET NOTE
[address] [place] [address] and Therapy [address] [place] [address]., Rashad reddy, [facility] office: [phone] fax: [phone]  Physical Therapy Treatment Note/ Progress Report:           Date:  2023    Patient Name:  Sheryl Rosa    :  1960  MRN: [id number]  Restrictions/Precautions:    Medical/Treatment Diagnosis Information:  Diagnosis: S/P L RCR and subpectoral biceps tenodesis -  - M75.102  Treatment Diagnosis: decreased ADL status, ROM, strength, and high-level iADLs  Insurance/Certification information:  PT Insurance Information: [place]  Physician Information:  Lana Dockery MD  Has the plan of care been signed (Y/N):        [x]  Yes  []  No     Date of Patient follow up with Physician: 2023      Is this a Progress Report:     [x]  Yes  []  No          Progress report/ Recertification will be due (10 Rx or 30 days whichever is less):23        Visit # Insurance Allowable Auth Required   15 15 + 100 on secondary per pt reports []  Yes []  No        Functional Scale:   UEFI   - 100% limitation  - 26.25% limitation         Latex Allergy:  [x]NO      []YES  Preferred Language for Healthcare:   [x]English       []other:      Pain level:  0-1/10 1    SUBJECTIVE:    : 11 weeks post op. Pt reports she drove to work yesterday and had a lot more pain in her arm when she got home. She also drove Friday to work which was sore. She says was more sore yesterday though. She also says her desk set-up is different at work vs her home set up. She had pain anterior, lateral and posterior shoulder. Her pain was as intense as it was when her nerve block wore off after surgery. Her shoulder was throbbing. She tried ice which made it worse. Then put some cream on it which helped. Was finally able to sleep after this. Feeling better today. Other than felt the same pain driving to PT today. : Didn't sleep well last night. Even after advil and everything.  Usually only

## 2023-12-14 ENCOUNTER — HOSPITAL ENCOUNTER (OUTPATIENT)
Dept: WOMENS IMAGING | Age: 63
Discharge: HOME | End: 2023-12-14
Payer: COMMERCIAL

## 2023-12-14 ENCOUNTER — HOSPITAL ENCOUNTER (OUTPATIENT)
Dept: PHYSICAL THERAPY | Age: 63
Setting detail: THERAPIES SERIES
Discharge: HOME | End: 2023-12-14
Attending: ORTHOPAEDIC SURGERY
Payer: COMMERCIAL

## 2023-12-14 ENCOUNTER — OFFICE VISIT (OUTPATIENT)
Dept: ORTHOPEDIC SURGERY | Age: 63
End: 2023-12-14

## 2023-12-14 ENCOUNTER — APPOINTMENT (OUTPATIENT)
Dept: ULTRASOUND IMAGING | Age: 63
End: 2023-12-14
Payer: COMMERCIAL

## 2023-12-14 VITALS — WEIGHT: 153 LBS | HEIGHT: 61 IN | BODY MASS INDEX: 28.89 KG/M2

## 2023-12-14 VITALS — HEIGHT: 61 IN | WEIGHT: 152 LBS | BODY MASS INDEX: 28.7 KG/M2 | RESPIRATION RATE: 12 BRPM

## 2023-12-14 DIAGNOSIS — M75.102 TEAR OF LEFT ROTATOR CUFF, UNSPECIFIED TEAR EXTENT, UNSPECIFIED WHETHER TRAUMATIC: Primary | ICD-10-CM

## 2023-12-14 DIAGNOSIS — R92.8 ABNORMAL MAMMOGRAM OF RIGHT BREAST: ICD-10-CM

## 2023-12-14 PROCEDURE — 97110 THERAPEUTIC EXERCISES: CPT | Performed by: PHYSICAL THERAPIST

## 2023-12-14 PROCEDURE — G0279 TOMOSYNTHESIS, MAMMO: HCPCS

## 2023-12-14 PROCEDURE — 97112 NEUROMUSCULAR REEDUCATION: CPT | Performed by: PHYSICAL THERAPIST

## 2023-12-14 PROCEDURE — 99024 POSTOP FOLLOW-UP VISIT: CPT | Performed by: ORTHOPAEDIC SURGERY

## 2023-12-14 PROCEDURE — 97140 MANUAL THERAPY 1/> REGIONS: CPT | Performed by: PHYSICAL THERAPIST

## 2023-12-14 RX ORDER — MELOXICAM 15 MG/1
15 TABLET ORAL DAILY
Qty: 30 TABLET | Refills: 3 | Status: SHIPPED | OUTPATIENT
Start: 2023-12-14

## 2023-12-14 NOTE — PROGRESS NOTES
Fern Mccrary returns today about 3 months status post left shoulder arthroscopy with rotator cuff repair and bicep tenodesis. Overall she says she is continuing to make progress but feels like she lacks endurance. General Exam:    Vitals: Resp. rate 12, height 1.549 m (5' 1\"), weight 68.9 kg (152 lb), not currently breastfeeding. Constitutional: Patient is adequately groomed with no evidence of malnutrition  Mental Status: The patient is oriented to time, place and person. The patient's mood and affect are appropriate. Left shoulder today is well-healed incisions and no tenderness. She complains of mild discomfort with external rotation and abduction but has good strength. She is a little bit limited in elevation at about 160 degrees. Assessment: Making appropriate progress status post left shoulder surgery. Plan: Prescription for meloxicam was provided. She told me that she has tolerated that in the past.  I think that may help with her little bit of stiffness and discomfort. She will continue with rehab.   Follow-up with me in 3 months

## 2023-12-14 NOTE — FLOWSHEET NOTE
[address] [place] [address] and Therapy [address] [place] [address]., Solorz[place] maureen, [address] [place] [address] office: [phone] fax: [phone]  Physical Therapy Treatment Note/ Progress Report:           Date:  2023    Patient Name:  Sheryl Rosa    :  1960  MRN: [id number]  Restrictions/Precautions:    Medical/Treatment Diagnosis Information:  Diagnosis: S/P L RCR and subpectoral biceps tenodesis -  - M75.102  Treatment Diagnosis: decreased ADL status, ROM, strength, and high-level iADLs  Insurance/Certification information:  PT Insurance Information: [place]  Physician Information:  Lana Dockery MD  Has the plan of care been signed (Y/N):        [x]  Yes  []  No     Date of Patient follow up with Physician: 2023      Is this a Progress Report:     [x]  Yes  []  No          Progress report/ Recertification will be due (10 Rx or 30 days whichever is less):23        Visit # Insurance Allowable Auth Required   16 15 + 100 on secondary per pt reports []  Yes []  No        Functional Scale:   UEFI   - 100% limitation  - 26.25% limitation         Latex Allergy:  [x]NO      []YES  Preferred Language for Healthcare:   [x]English       []other:      Pain level:  0-1/10     SUBJECTIVE:    : states she has been achy and hurting. She just came from the MD office, who is going to prescribe Meloxicam. She thinks the ache is worse when driving and kept her up last night. OBJECTIVE:   Observation:   Test measurements:           ROM PROM AROM-23     L- R L R   Flexion 148 pulleys; ; l    115 slow to get full range 166   Abduction  174 pulleys in some scaption;    100 184   ER  58 supine at side with cane,47 at 90 abd with cane    45 at side;  36 at 90 abd 92 at side and at 90 abd   IR  60  62     BB IR      L1 T9    Other             Incision: healing well with no signs of infection.  Is tender and mild-mod tightness over incisions    RESTRICTIONS/PRECAUTIONS: MD

## 2023-12-21 ENCOUNTER — TELEPHONE (OUTPATIENT)
Dept: FAMILY MEDICINE CLINIC | Age: 63
End: 2023-12-21

## 2023-12-21 RX ORDER — LANSOPRAZOLE 30 MG/1
30 CAPSULE, DELAYED RELEASE ORAL DAILY
Qty: 90 CAPSULE | Refills: 1 | Status: SHIPPED | OUTPATIENT
Start: 2023-12-21

## 2023-12-21 NOTE — TELEPHONE ENCOUNTER
Received a fax stating that medication Prevacid cap 30mg is not covered by insurance. Suggestions are PANTOPRAZOLE 40MG - LANSOPRAZOLE 30mg -ESOMEPRAZOLE 20MG DR CAP.

## 2023-12-22 ENCOUNTER — TELEPHONE (OUTPATIENT)
Dept: FAMILY MEDICINE CLINIC | Age: 63
End: 2023-12-22

## 2023-12-22 NOTE — TELEPHONE ENCOUNTER
Why does she need prevacid   Problems with Protonix Prilosec and Nexium in past   I do not see letter I wrote before ?  Can you find it

## 2023-12-22 NOTE — TELEPHONE ENCOUNTER
Patient called to let pcp know that she needs a new letter stating she must take Prevacid cap 30mg  and she needs the letter sent to [employer] [id number], which is her secondary insurance that covers her prescription. She has a letter from pcp but it has .    She said the letter last year was processed under cover my meds.    Please call patient when letter has been processed for her script to be ok'd.

## 2023-12-28 ENCOUNTER — HOSPITAL ENCOUNTER (OUTPATIENT)
Dept: PHYSICAL THERAPY | Age: 63
Setting detail: THERAPIES SERIES
Discharge: HOME | End: 2023-12-28
Attending: ORTHOPAEDIC SURGERY
Payer: COMMERCIAL

## 2023-12-28 PROCEDURE — 97140 MANUAL THERAPY 1/> REGIONS: CPT | Performed by: PHYSICAL THERAPIST

## 2023-12-28 PROCEDURE — 97112 NEUROMUSCULAR REEDUCATION: CPT | Performed by: PHYSICAL THERAPIST

## 2023-12-28 PROCEDURE — 97110 THERAPEUTIC EXERCISES: CPT | Performed by: PHYSICAL THERAPIST

## 2024-01-02 ENCOUNTER — TELEPHONE (OUTPATIENT)
Dept: FAMILY MEDICINE CLINIC | Age: 64
End: 2024-01-02

## 2024-01-02 ENCOUNTER — HOSPITAL ENCOUNTER (OUTPATIENT)
Dept: PHYSICAL THERAPY | Age: 64
Setting detail: THERAPIES SERIES
Discharge: HOME | End: 2024-01-02
Attending: ORTHOPAEDIC SURGERY
Payer: COMMERCIAL

## 2024-01-02 PROCEDURE — 97112 NEUROMUSCULAR REEDUCATION: CPT | Performed by: PHYSICAL THERAPIST

## 2024-01-02 PROCEDURE — 97110 THERAPEUTIC EXERCISES: CPT | Performed by: PHYSICAL THERAPIST

## 2024-01-02 PROCEDURE — 97140 MANUAL THERAPY 1/> REGIONS: CPT | Performed by: PHYSICAL THERAPIST

## 2024-01-02 NOTE — FLOWSHEET NOTE
re-injury.     [] Progressing: [x] Met: [] Not Met: [] Adjusted   2. Patient will have a decrease in pain to facilitate improvement in movement, function, and ADLs as indicated by Functional Deficits.    [] Progressing: [x] Met: [] Not Met: [] Adjusted  3. Patient will demonstrate increased left shoulder flex and ABD AROM to greater than or equal to 160 deg, ER AROM to greater than or equal to 80 deg, and IR AROM to greater than or equal to 50 deg to allow for proper joint functioning as indicated by patients Functional Deficits.    [x] Progressing: met all except ER [] Met: [x] Not Met: [] Adjusted    Long Term Goals: To be achieved in: 24-28 weeks  1. Disability index score of 50% or less for the UEFI to assist with reaching prior level of function.   [] Progressing: [x] Met: [] Not Met: [] Adjusted  2. Patient will demonstrate an increase in L shoulder (flex, ABD, ER, and IR) strength to 4/5 to allow for proper functional mobility as indicated by patients Functional Deficits.   [x] Progressing: [] Met: [x] Not Met: [] Adjusted  3. Patient will return to ADLs functional activities without increased symptoms or restriction.   [x] Progressing: [] Met: [x] Not Met: [] Adjusted  4. Patient will report doing independent pilates pain free.   [x] Progressing: [] Met: [x] Not Met: [] Adjusted       Overall Progression Towards Functional goals/ Treatment Progress Update:  [x] Patient is progressing as expected towards functional goals listed.    [] Progression is slowed due to complexities/Impairments listed.  [] Progression has been slowed due to co-morbidities.  [] Plan just implemented, too soon to assess goals progression <30days   [] Goals require adjustment due to lack of progress  [] Patient is not progressing as expected and requires additional follow up with physician  [] Other    Prognosis for POC: [x] Good [] Fair  [] Poor      Patient requires continued skilled intervention: [x] Yes  []

## 2024-01-02 NOTE — TELEPHONE ENCOUNTER
Received fax from [facility] [employer] about the letter we faxed regarding PREVACID 30 MG DELAYED CAPSULES.   Missing information: Member's Policy ID #  Prescribers NPI#  Drug name, Dose, Directions for use, Date of service, # of visit (if applicable)  Servicing Provider Name, NPI #, Address, Phone and Fax#    Letter updated and faxed to [phone]

## 2024-01-04 ENCOUNTER — HOSPITAL ENCOUNTER (OUTPATIENT)
Dept: PHYSICAL THERAPY | Age: 64
Setting detail: THERAPIES SERIES
Discharge: HOME | End: 2024-01-04
Attending: ORTHOPAEDIC SURGERY
Payer: COMMERCIAL

## 2024-01-04 PROCEDURE — 97110 THERAPEUTIC EXERCISES: CPT | Performed by: PHYSICAL THERAPIST

## 2024-01-04 PROCEDURE — 97112 NEUROMUSCULAR REEDUCATION: CPT | Performed by: PHYSICAL THERAPIST

## 2024-01-04 PROCEDURE — 97140 MANUAL THERAPY 1/> REGIONS: CPT | Performed by: PHYSICAL THERAPIST

## 2024-01-04 NOTE — TELEPHONE ENCOUNTER
Patient called regarding fax to [facility] for prevacid.    The fax was incorrect and please refax to [phone].    Please let pt know when info has been refaxed.

## 2024-01-08 ENCOUNTER — HOSPITAL ENCOUNTER (OUTPATIENT)
Dept: PHYSICAL THERAPY | Age: 64
Setting detail: THERAPIES SERIES
Discharge: HOME | End: 2024-01-08
Attending: ORTHOPAEDIC SURGERY
Payer: COMMERCIAL

## 2024-01-08 PROCEDURE — 97140 MANUAL THERAPY 1/> REGIONS: CPT | Performed by: PHYSICAL THERAPIST

## 2024-01-08 PROCEDURE — 97112 NEUROMUSCULAR REEDUCATION: CPT | Performed by: PHYSICAL THERAPIST

## 2024-01-08 PROCEDURE — 97110 THERAPEUTIC EXERCISES: CPT | Performed by: PHYSICAL THERAPIST

## 2024-01-08 NOTE — FLOWSHEET NOTE
goal: movement without pain     [x] Progressing: [] Met: [x] Not Met: [] Adjusted    Therapist goals for Patient:   Short Term Goals: To be achieved in: 8 weeks  1. Independent in HEP and progression per patient tolerance, in order to prevent re-injury.     [] Progressing: [x] Met: [] Not Met: [] Adjusted   2. Patient will have a decrease in pain to facilitate improvement in movement, function, and ADLs as indicated by Functional Deficits.    [] Progressing: [x] Met: [] Not Met: [] Adjusted  3. Patient will demonstrate increased left shoulder flex and ABD AROM to greater than or equal to 160 deg, ER AROM to greater than or equal to 80 deg, and IR AROM to greater than or equal to 50 deg to allow for proper joint functioning as indicated by patients Functional Deficits.    [x] Progressing: met all except ER [] Met: [x] Not Met: [] Adjusted    Long Term Goals: To be achieved in: 24-28 weeks  1. Disability index score of 50% or less for the UEFI to assist with reaching prior level of function.   [] Progressing: [x] Met: [] Not Met: [] Adjusted  2. Patient will demonstrate an increase in L shoulder (flex, ABD, ER, and IR) strength to 4/5 to allow for proper functional mobility as indicated by patients Functional Deficits.   [x] Progressing: [] Met: [x] Not Met: [] Adjusted  3. Patient will return to ADLs functional activities without increased symptoms or restriction.   [x] Progressing: [] Met: [x] Not Met: [] Adjusted  4. Patient will report doing independent pilates pain free.   [x] Progressing: [] Met: [x] Not Met: [] Adjusted       Overall Progression Towards Functional goals/ Treatment Progress Update:  [x] Patient is progressing as expected towards functional goals listed.    [] Progression is slowed due to complexities/Impairments listed.  [] Progression has been slowed due to co-morbidities.  [] Plan just implemented, too soon to assess goals progression <30days   [] Goals require adjustment due to lack of

## 2024-01-10 ENCOUNTER — HOSPITAL ENCOUNTER (OUTPATIENT)
Dept: PHYSICAL THERAPY | Age: 64
Setting detail: THERAPIES SERIES
Discharge: HOME | End: 2024-01-10
Attending: ORTHOPAEDIC SURGERY
Payer: COMMERCIAL

## 2024-01-10 PROCEDURE — 97110 THERAPEUTIC EXERCISES: CPT

## 2024-01-10 PROCEDURE — 97140 MANUAL THERAPY 1/> REGIONS: CPT

## 2024-01-10 PROCEDURE — 97112 NEUROMUSCULAR REEDUCATION: CPT

## 2024-01-10 NOTE — FLOWSHEET NOTE
Other:  [] TA x      [] Mech Traction (65324)  [] ES(attended) (23909)      [] ES (un) (71747):         ASSESSMENT:        GOALS:  Patient stated goal: movement without pain     [x] Progressing: [] Met: [x] Not Met: [] Adjusted    Therapist goals for Patient:   Short Term Goals: To be achieved in: 8 weeks  1. Independent in HEP and progression per patient tolerance, in order to prevent re-injury.     [] Progressing: [x] Met: [] Not Met: [] Adjusted   2. Patient will have a decrease in pain to facilitate improvement in movement, function, and ADLs as indicated by Functional Deficits.    [] Progressing: [x] Met: [] Not Met: [] Adjusted  3. Patient will demonstrate increased left shoulder flex and ABD AROM to greater than or equal to 160 deg, ER AROM to greater than or equal to 80 deg, and IR AROM to greater than or equal to 50 deg to allow for proper joint functioning as indicated by patients Functional Deficits.    [x] Progressing: met all except ER [] Met: [x] Not Met: [] Adjusted    Long Term Goals: To be achieved in: 24-28 weeks  1. Disability index score of 50% or less for the UEFI to assist with reaching prior level of function.   [] Progressing: [x] Met: [] Not Met: [] Adjusted  2. Patient will demonstrate an increase in L shoulder (flex, ABD, ER, and IR) strength to 4/5 to allow for proper functional mobility as indicated by patients Functional Deficits.   [x] Progressing: [] Met: [x] Not Met: [] Adjusted  3. Patient will return to ADLs functional activities without increased symptoms or restriction.   [x] Progressing: [] Met: [x] Not Met: [] Adjusted  4. Patient will report doing independent pilates pain free.   [x] Progressing: [] Met: [x] Not Met: [] Adjusted       Overall Progression Towards Functional goals/ Treatment Progress Update:  [x] Patient is progressing as expected towards functional goals listed.    [] Progression is slowed due to complexities/Impairments listed.  [] Progression has been slowed

## 2024-01-15 ENCOUNTER — HOSPITAL ENCOUNTER (OUTPATIENT)
Dept: PHYSICAL THERAPY | Age: 64
Setting detail: THERAPIES SERIES
Discharge: HOME | End: 2024-01-15
Attending: ORTHOPAEDIC SURGERY
Payer: COMMERCIAL

## 2024-01-15 ENCOUNTER — APPOINTMENT (OUTPATIENT)
Dept: PHYSICAL THERAPY | Age: 64
End: 2024-01-15
Attending: ORTHOPAEDIC SURGERY
Payer: COMMERCIAL

## 2024-01-15 PROCEDURE — 97110 THERAPEUTIC EXERCISES: CPT | Performed by: PHYSICAL THERAPIST

## 2024-01-15 PROCEDURE — 97112 NEUROMUSCULAR REEDUCATION: CPT | Performed by: PHYSICAL THERAPIST

## 2024-01-15 PROCEDURE — 97530 THERAPEUTIC ACTIVITIES: CPT | Performed by: PHYSICAL THERAPIST

## 2024-01-15 NOTE — FLOWSHEET NOTE
(23553):         ASSESSMENT:        GOALS:  Patient stated goal: movement without pain     [x] Progressing: [] Met: [x] Not Met: [] Adjusted    Therapist goals for Patient:   Short Term Goals: To be achieved in: 8 weeks  1. Independent in HEP and progression per patient tolerance, in order to prevent re-injury.     [] Progressing: [x] Met: [] Not Met: [] Adjusted   2. Patient will have a decrease in pain to facilitate improvement in movement, function, and ADLs as indicated by Functional Deficits.    [] Progressing: [x] Met: [] Not Met: [] Adjusted  3. Patient will demonstrate increased left shoulder flex and ABD AROM to greater than or equal to 160 deg, ER AROM to greater than or equal to 80 deg, and IR AROM to greater than or equal to 50 deg to allow for proper joint functioning as indicated by patients Functional Deficits.    [x] Progressing: met all except ER [] Met: [x] Not Met: [] Adjusted    Long Term Goals: To be achieved in: 24-28 weeks  1. Disability index score of 50% or less for the UEFI to assist with reaching prior level of function.   [] Progressing: [x] Met: [] Not Met: [] Adjusted  2. Patient will demonstrate an increase in L shoulder (flex, ABD, ER, and IR) strength to 4/5 to allow for proper functional mobility as indicated by patients Functional Deficits.   [x] Progressing: [] Met: [x] Not Met: [] Adjusted  3. Patient will return to ADLs functional activities without increased symptoms or restriction.   [x] Progressing: [] Met: [x] Not Met: [] Adjusted  4. Patient will report doing independent pilates pain free.   [x] Progressing: [] Met: [x] Not Met: [] Adjusted       Overall Progression Towards Functional goals/ Treatment Progress Update:  [x] Patient is progressing as expected towards functional goals listed.    [] Progression is slowed due to complexities/Impairments listed.  [] Progression has been slowed due to co-morbidities.  [] Plan just implemented, too soon to assess goals progression

## 2024-01-18 ENCOUNTER — APPOINTMENT (OUTPATIENT)
Dept: PHYSICAL THERAPY | Age: 64
End: 2024-01-18
Attending: ORTHOPAEDIC SURGERY
Payer: COMMERCIAL

## 2024-01-22 ENCOUNTER — HOSPITAL ENCOUNTER (OUTPATIENT)
Dept: PHYSICAL THERAPY | Age: 64
Setting detail: THERAPIES SERIES
Discharge: HOME | End: 2024-01-22
Attending: ORTHOPAEDIC SURGERY
Payer: COMMERCIAL

## 2024-01-22 PROCEDURE — 97110 THERAPEUTIC EXERCISES: CPT | Performed by: PHYSICAL THERAPIST

## 2024-01-22 PROCEDURE — 97112 NEUROMUSCULAR REEDUCATION: CPT | Performed by: PHYSICAL THERAPIST

## 2024-01-22 PROCEDURE — 97140 MANUAL THERAPY 1/> REGIONS: CPT | Performed by: PHYSICAL THERAPIST

## 2024-01-22 NOTE — FLOWSHEET NOTE
Lower Trap      Mid Trap      Rhomboids      Biceps 4+ pain anterior shoulder 5    Triceps 5 5    Horizontal Abduction      Horizontal Adduction      Lats        Incision: healing well with no signs of infection. Is tender and mild-mod tightness over incisions    RESTRICTIONS/PRECAUTIONS: MD protocol    Exercises/Interventions:   Exercise/Equipment Resistance/Repetitions Other comments   Stretching/PROM     Wand     Doorway pec stretch Low at 45 abd arms flexed and at 90 abd 2 x 30\" at each    ER cane- supineat side  and 90 abd    Pulleys n        UE ranger flex    IR  behind back 10 sec x 10  with pulley vertical    Sleeper/SL IR    SB rolls on table    CBA    Wall slides 10 x 5\" lift off     Cane flexion    Isometrics     Retraction        Weight shift     Flexion    Abduction    External Rotation    Internal Rotation    Biceps     Triceps     Scapular clock sidelying    PRE's     Supine ABCs    Supine flexion AROM    Standing scaption 3 x 10 2#    Abduction SL 3 x 10 1#    External Rotation 3 x 10 2#    Internal Rotation     Shrugs    EXT     Reverse Flys Prone Ts 2 x 10     Serratus    Horizontal Abd with ER     Biceps 4#  3 x 10    Triceps    Retraction    Wrist flex/extn    Cable Column/Theraband  Latex free TB for at home   External Rotation    Internal Rotation 3  x10 orange TB    Shrugs     Lats     Ext 2 x 10 orange TB    Flex     Scapular Retraction 3 x 10 blue TB    BIC     TRIC 3 x 10 orange TB    PNF D2 flex AROM 2 x 10   With back against wall to avoid back extn   Wall push up 3 x 10     Wbing weight shifts- alt shoulder taps    Dynamic Stability     Ball on wall 2x30 Cw/CCW 2#    Plyoback          Manual interventions      STM to pec, anterior and lateral shoulder. i a8' supine             Therapeutic Exercise and NMR EXR  [x] (95955) Provided verbal/tactile cueing for activities related to strengthening, flexibility, endurance, ROM  for improvements in scapular, scapulothoracic and UE control

## 2024-01-24 ENCOUNTER — HOSPITAL ENCOUNTER (OUTPATIENT)
Dept: PHYSICAL THERAPY | Age: 64
Setting detail: THERAPIES SERIES
Discharge: HOME | End: 2024-01-24
Attending: ORTHOPAEDIC SURGERY
Payer: COMMERCIAL

## 2024-01-24 PROCEDURE — 97140 MANUAL THERAPY 1/> REGIONS: CPT

## 2024-01-24 PROCEDURE — 97110 THERAPEUTIC EXERCISES: CPT

## 2024-01-24 PROCEDURE — 97112 NEUROMUSCULAR REEDUCATION: CPT

## 2024-01-24 NOTE — FLOWSHEET NOTE
[facility] and Therapy [address] [place] [address] office: [phone] fax: [phone]      Physical Therapy Treatment Note/ Progress Report:           Date:  2024    Patient Name:  Mikaela Adair    :  1960  MRN: [id number]  Restrictions/Precautions:    Medical/Treatment Diagnosis Information:  Diagnosis: S/P L RCR and subpectoral biceps tenodesis -  - M75.102  Treatment Diagnosis: decreased ADL status, ROM, strength, and high-level iADLs  Insurance/Certification information:  PT Insurance Information: [place]  Physician Information:  Rian Lu MD  Has the plan of care been signed (Y/N):        [x]  Yes  []  No     Date of Patient follow up with Physician: 3 months from 2023      Is this a Progress Report:     [x]  Yes  []  No          Progress report/ Recertification will be due (10 Rx or 30 days whichever is less):24        Visit # Insurance Allowable Auth Required   7 in   (Used 2023) 15 + 100 on secondary per pt reports []  Yes []  No        Functional Scale:   UEFI   - 100% limitation  - 26.25% limitation  - 11.25%limitation         Latex Allergy:  [x]NO      []YES  Preferred Language for Healthcare:   [x]English       []other:      Pain level:  0-1/10     SUBJECTIVE:    :pt says she tried to go off the Meloxicam and work up with a deep ache in shoulder. Therefore returned to taking Meloxicam and feeling better. Felt fine after last session  : Doing good. Really doesn't have any soreness with normal ADLs. Does feel it with stretching.     OBJECTIVE:   Observation:   Test measurements:           ROM PROM AROM     L- R L- R   Flexion  160 manual    159 slow to get full range and hard to lower 166   Abduction   180 manual    156 184   ER  80 at side manual;     72 at side;  64 at 90 abd 92 at side and at 90 abd   IR  68  62     BB IR     T11 T9    Other             Strength-23 L R

## 2024-01-26 ENCOUNTER — TELEPHONE (OUTPATIENT)
Dept: FAMILY MEDICINE CLINIC | Age: 64
End: 2024-01-26

## 2024-01-26 ENCOUNTER — PATIENT MESSAGE (OUTPATIENT)
Dept: FAMILY MEDICINE CLINIC | Age: 64
End: 2024-01-26

## 2024-01-26 DIAGNOSIS — M15.9 PRIMARY OSTEOARTHRITIS INVOLVING MULTIPLE JOINTS: ICD-10-CM

## 2024-01-26 DIAGNOSIS — Z79.899 MEDICATION MANAGEMENT: ICD-10-CM

## 2024-01-26 DIAGNOSIS — E55.9 VITAMIN D DEFICIENCY: ICD-10-CM

## 2024-01-26 DIAGNOSIS — E06.3 HYPOTHYROIDISM DUE TO HASHIMOTO'S THYROIDITIS: Primary | ICD-10-CM

## 2024-01-26 DIAGNOSIS — E03.8 HYPOTHYROIDISM DUE TO HASHIMOTO'S THYROIDITIS: Primary | ICD-10-CM

## 2024-01-26 NOTE — TELEPHONE ENCOUNTER
From: Mikaela Adair  To: Dr. Imelda Irving  Sent: 1/26/2024 8:24 AM EST  Subject: Comprehensive Metabolic Panel     I'm requesting an order for a Comprehensive Metabolic Panel blood test from Dr. Irving. I'm planning to schedule an appointment for my yearly check-up and would like to have the test completed so we can review at my appointment. If you could call me ([phone]) when the order is ready, I'll stop by the office and pick it up.     PLEASE NOTE on the order that I'd like to include the following to items:  - Thyroid blood work: Free T3, Free T4 and TSH  - Vitamin D 25 (oh)    Thanks!

## 2024-01-29 ENCOUNTER — HOSPITAL ENCOUNTER (OUTPATIENT)
Dept: PHYSICAL THERAPY | Age: 64
Setting detail: THERAPIES SERIES
Discharge: HOME | End: 2024-01-29
Attending: ORTHOPAEDIC SURGERY
Payer: COMMERCIAL

## 2024-01-29 PROCEDURE — 97530 THERAPEUTIC ACTIVITIES: CPT | Performed by: PHYSICAL THERAPIST

## 2024-01-29 PROCEDURE — 97112 NEUROMUSCULAR REEDUCATION: CPT | Performed by: PHYSICAL THERAPIST

## 2024-01-29 PROCEDURE — 97110 THERAPEUTIC EXERCISES: CPT | Performed by: PHYSICAL THERAPIST

## 2024-01-29 NOTE — PLAN OF CARE
care, reaching, carrying, lifting, house/yardwork, driving/computer work    Modalities:     [] GAME READY (VASO)- for significant edema, swelling, pain control.     Charges:  Timed Code Treatment Minutes: 40   Total Treatment Minutes: 41      [] EVAL (LOW) 46584 (typically 20 minutes face-to-face)  [] EVAL (MOD) 73505 (typically 30 minutes face-to-face)  [] EVAL (HIGH) 08970 (typically 45 minutes face-to-face)  [] RE-EVAL     [x] TE(89918) x 1   [] IONTO  [x] NMR (26060) x 1  [] VASO  [] Manual (01141) x    [] Other:  [x] TA x 1     [] Mech Traction (56454)  [] ES(attended) (39190)      [] ES (un) (91879):         ASSESSMENT:        GOALS:  Patient stated goal: movement without pain     [x] Progressing: [] Met: [x] Not Met: [] Adjusted    Therapist goals for Patient:   Short Term Goals: To be achieved in: 8 weeks  1. Independent in HEP and progression per patient tolerance, in order to prevent re-injury.     [] Progressing: [x] Met: [] Not Met: [] Adjusted   2. Patient will have a decrease in pain to facilitate improvement in movement, function, and ADLs as indicated by Functional Deficits.    [] Progressing: [x] Met: [] Not Met: [] Adjusted  3. Patient will demonstrate increased left shoulder flex and ABD AROM to greater than or equal to 160 deg, ER AROM to greater than or equal to 80 deg, and IR AROM to greater than or equal to 50 deg to allow for proper joint functioning as indicated by patients Functional Deficits.    [x] Progressing: met all except ER [] Met: [x] Not Met: [] Adjusted    Long Term Goals: To be achieved in: 24-28 weeks  1. Disability index score of 50% or less for the UEFI to assist with reaching prior level of function.   [] Progressing: [x] Met: [] Not Met: [] Adjusted  2. Patient will demonstrate an increase in L shoulder (flex, ABD, ER, and IR) strength to 4/5 to allow for proper functional mobility as indicated by patients Functional Deficits.   [x] Progressing: [] Met: [x] Not Met: []

## 2024-01-31 ENCOUNTER — OFFICE VISIT (OUTPATIENT)
Dept: FAMILY MEDICINE CLINIC | Age: 64
End: 2024-01-31
Payer: COMMERCIAL

## 2024-01-31 VITALS
TEMPERATURE: 98.2 F | WEIGHT: 158 LBS | HEIGHT: 61 IN | BODY MASS INDEX: 29.83 KG/M2 | DIASTOLIC BLOOD PRESSURE: 84 MMHG | SYSTOLIC BLOOD PRESSURE: 130 MMHG | HEART RATE: 73 BPM | OXYGEN SATURATION: 98 %

## 2024-01-31 DIAGNOSIS — R01.1 HEART MURMUR, SYSTOLIC: ICD-10-CM

## 2024-01-31 DIAGNOSIS — R60.0 BILATERAL LEG EDEMA: Primary | ICD-10-CM

## 2024-01-31 PROCEDURE — 99213 OFFICE O/P EST LOW 20 MIN: CPT | Performed by: INTERNAL MEDICINE

## 2024-01-31 RX ORDER — FUROSEMIDE 20 MG/1
20 TABLET ORAL DAILY
Qty: 30 TABLET | Refills: 2 | Status: SHIPPED | OUTPATIENT
Start: 2024-01-31

## 2024-01-31 ASSESSMENT — PATIENT HEALTH QUESTIONNAIRE - PHQ9
SUM OF ALL RESPONSES TO PHQ QUESTIONS 1-9: 0
SUM OF ALL RESPONSES TO PHQ9 QUESTIONS 1 & 2: 0
SUM OF ALL RESPONSES TO PHQ QUESTIONS 1-9: 0
SUM OF ALL RESPONSES TO PHQ QUESTIONS 1-9: 0
2. FEELING DOWN, DEPRESSED OR HOPELESS: 0
SUM OF ALL RESPONSES TO PHQ QUESTIONS 1-9: 0
1. LITTLE INTEREST OR PLEASURE IN DOING THINGS: 0

## 2024-01-31 NOTE — PROGRESS NOTES
SUBJECTIVE:  Mikaela Adair is a 63 y.o. female being evaluated for:    Chief Complaint   Patient presents with    Swelling     Swelling in legs and its painful feels like skin might pop       HPI   Both legs are swelling  worse at the end of the day Does sit all day but tries to raise her legs  Tight and feels as though skin will pop abnormlly sore just below the knees medially    No falls or trauma   Seeing gyn in May or June   Allergies   Allergen Reactions    Latex Itching and Rash     BANDAIDS-    Diclofenac      flushing    Atorvastatin      Muscle aches -SEVERE    Codeine Itching    Gluten Meal Itching and Rash    Simvastatin      Muscle aches-SEVERE       Current Outpatient Medications   Medication Sig Dispense Refill    furosemide (LASIX) 20 MG tablet Take 1 tablet by mouth daily 30 tablet 2    lansoprazole (PREVACID) 30 MG delayed release capsule Take 1 capsule by mouth daily 90 capsule 1    docusate sodium (COLACE) 100 MG capsule Take 1 capsule by mouth 2 times daily Post-op 60 capsule 0    Multiple Vitamin (MULTIVITAMIN ADULT PO) Take 1 tablet by mouth daily      NONFORMULARY Take 1 tablet by mouth daily PROCITE-D -B-6-FOLATE-B-12-SELENIUM-      NONFORMULARY Take 2 tablets by mouth daily ADRENAL DMG      Methylsulfonylmethane (MSM PO) Take 830 mg by mouth daily      NONFORMULARY Take 2 capsules by mouth at bedtime PHENIBUT HCI-TAKES 2 CAPS OF 300MG      cetirizine (ZYRTEC) 10 MG tablet Take 1 tablet by mouth daily      diclofenac sodium (VOLTAREN) 1 % GEL Apply 4 g topically 4 times daily as needed for Pain 100 g 3    ARMOUR THYROID 90 MG tablet Take 1 tablet by mouth daily      valACYclovir (VALTREX) 500 MG tablet Take 1 tablet by mouth daily (Patient taking differently: Take 1 tablet by mouth as needed) 90 tablet 3    Probiotic Product (PROBIOTIC PO) Take 1 capsule by mouth daily      Hormone Cream Base (HRT CREAM BASE) CREA Place 1 application  vaginally at bedtime      polyethylene glycol

## 2024-02-01 ENCOUNTER — APPOINTMENT (OUTPATIENT)
Dept: PHYSICAL THERAPY | Age: 64
End: 2024-02-01
Attending: ORTHOPAEDIC SURGERY
Payer: COMMERCIAL

## 2024-02-05 ENCOUNTER — HOSPITAL ENCOUNTER (OUTPATIENT)
Dept: PHYSICAL THERAPY | Age: 64
Setting detail: THERAPIES SERIES
Discharge: HOME | End: 2024-02-05
Attending: ORTHOPAEDIC SURGERY
Payer: COMMERCIAL

## 2024-02-05 PROCEDURE — 97110 THERAPEUTIC EXERCISES: CPT | Performed by: PHYSICAL THERAPIST

## 2024-02-05 PROCEDURE — 97112 NEUROMUSCULAR REEDUCATION: CPT | Performed by: PHYSICAL THERAPIST

## 2024-02-05 PROCEDURE — 97530 THERAPEUTIC ACTIVITIES: CPT | Performed by: PHYSICAL THERAPIST

## 2024-02-05 NOTE — FLOWSHEET NOTE
Flexion  - 1 x daily - 3 x weekly - 3 sets - 10 reps  Access Code: [email or web address]  URL: [email or web address]  Date: 02/05/2024  Prepared by: Maryann Segura    Exercises  - Quadruped Y  - 1 x daily - 3 x weekly - 3 sets - 10 reps  - Prone Middle Trapezius with Legs Straight on Swiss Ball  - 1 x daily - 3 x weekly - 2 sets - 10 reps - 2 hold  Manual Treatments:  PROM / STM / Oscillations-Mobs:  G-I, II, III, IV (PA's, Inf., Post.)  [x] (75056) Provided manual therapy to mobilize soft tissue/joints of cervical/CT, scapular GHJ and UE for the purpose of modulating pain, promoting relaxation,  increasing ROM, reducing/eliminating soft tissue swelling/inflammation/restriction, improving soft tissue extensibility and allowing for proper ROM for normal function with self care, reaching, carrying, lifting, house/yardwork, driving/computer work    Modalities:     [] GAME READY (VASO)- for significant edema, swelling, pain control.     Charges:  Timed Code Treatment Minutes: 38   Total Treatment Minutes: 40      [] EVAL (LOW) 58731 (typically 20 minutes face-to-face)  [] EVAL (MOD) 43956 (typically 30 minutes face-to-face)  [] EVAL (HIGH) 19208 (typically 45 minutes face-to-face)  [] RE-EVAL     [x] TE(03039) x 1   [] IONTO  [x] NMR (81158) x 1  [] VASO  [] Manual (63247) x    [] Other:  [x] TA x 1     [] Mech Traction (91017)  [] ES(attended) (87794)      [] ES (un) (83689):         ASSESSMENT:        GOALS:  Patient stated goal: movement without pain     [x] Progressing: [] Met: [x] Not Met: [] Adjusted    Therapist goals for Patient:   Short Term Goals: To be achieved in: 8 weeks  1. Independent in HEP and progression per patient tolerance, in order to prevent re-injury.     [] Progressing: [x] Met: [] Not Met: [] Adjusted   2. Patient will have a decrease in pain to facilitate improvement in movement, function, and ADLs as indicated by Functional Deficits.    [] Progressing: [x] Met: [] Not Met: [] Adjusted  3. Patient

## 2024-02-07 ENCOUNTER — APPOINTMENT (OUTPATIENT)
Dept: PHYSICAL THERAPY | Age: 64
End: 2024-02-07
Attending: ORTHOPAEDIC SURGERY
Payer: COMMERCIAL

## 2024-02-12 ENCOUNTER — HOSPITAL ENCOUNTER (OUTPATIENT)
Dept: PHYSICAL THERAPY | Age: 64
Setting detail: THERAPIES SERIES
Discharge: HOME | End: 2024-02-12
Attending: ORTHOPAEDIC SURGERY
Payer: COMMERCIAL

## 2024-02-12 PROCEDURE — 97110 THERAPEUTIC EXERCISES: CPT | Performed by: PHYSICAL THERAPIST

## 2024-02-12 PROCEDURE — 97112 NEUROMUSCULAR REEDUCATION: CPT | Performed by: PHYSICAL THERAPIST

## 2024-02-12 PROCEDURE — 97530 THERAPEUTIC ACTIVITIES: CPT | Performed by: PHYSICAL THERAPIST

## 2024-02-12 NOTE — FLOWSHEET NOTE
requires continued skilled intervention: [x] Yes  [] No      Treatment/Activity Tolerance:  [x] Patient tolerated treatment well [] Patient limited by fatique  [] Patient limited by pain  [] Patient limited by other medical complications  [x] Other:1/29:  Pt is doing well post op RCR. She shows improved AROM with mild limit in ER at 90 and some difficulty with end-range flex and abd due to weakness. She does continue to have some weakness with resisted ER, flex and abd but has been able to progress in UE strengthening each week of PT with appropriate fatigue. Pt will continue to benefit from skilled PT 1-2x/week to continue to progress her L shoulder strength and endurance to be able to return to PLOF and return to reaching into cabinets overhead without increased pain or restriction.   2/5: Pt did well in session today able to advance in resistance and to a couple new exercises. Pt was challenged by quadruped Ys, unable to raise as high as opposite UE. Fatigued at end of session but overall tolerating therapy well.   2/12: Pt has been doing well regarding her shoulder. Progressed in UE strengthening throughout session, able to increase resistance on exercises throughout. Also improved diagonal overhead without compensation today. Follow up in 2 weeks to check on progress.   Patient education: Patient education on PT and plan of care including diagnosis, prognosis, treatment goals and options. Patient agrees with discussed POC and treatment and is aware of rehab process. 9/21      PLAN: 1-2x/week for 8 weeks (1/29/24); consider dc to HEP if doing well  [x] Continue per plan of care [] Alter current plan (see comments above)  [] Plan of care initiated [] Hold pending MD visit [] Discharge      Electronically signed by:  Maryann Segura, PT, DPT     Note: If patient does not return for scheduled/ recommended follow up visits, this note will serve as a discharge from care along with most recent update on progress.

## 2024-02-15 ENCOUNTER — TELEPHONE (OUTPATIENT)
Dept: FAMILY MEDICINE CLINIC | Age: 64
End: 2024-02-15

## 2024-02-15 ENCOUNTER — APPOINTMENT (OUTPATIENT)
Dept: PHYSICAL THERAPY | Age: 64
End: 2024-02-15
Attending: ORTHOPAEDIC SURGERY
Payer: COMMERCIAL

## 2024-02-15 DIAGNOSIS — R60.0 BILATERAL LEG EDEMA: Primary | ICD-10-CM

## 2024-02-15 NOTE — TELEPHONE ENCOUNTER
----- Message from Mikaela Adair sent at 2/15/2024 11:24 AM EST -----  Regarding: Josias for vascular specialist  Contact: [phone]  This is a follow-up from my visit on 1/31 to discuss legs swelling with Dr. Irving. The medication prescribed (furosemide (LASIX) 20 MG tablet) is not addressing the swelling. I've also cut back on salt and a wearing compression socks, but the issues are not subsiding ... at all.     I'd prefer to not wait until a 3-month follow-up to move forward with looking at another solution for my swelling and pain.    Can you please provide a recommendation for the vascular specialist we talked about if the current course of action didn't address the situation?    Thanks!

## 2024-02-20 ENCOUNTER — APPOINTMENT (OUTPATIENT)
Dept: PHYSICAL THERAPY | Age: 64
End: 2024-02-20
Attending: ORTHOPAEDIC SURGERY
Payer: COMMERCIAL

## 2024-02-26 ENCOUNTER — HOSPITAL ENCOUNTER (OUTPATIENT)
Dept: PHYSICAL THERAPY | Age: 64
Setting detail: THERAPIES SERIES
Discharge: HOME | End: 2024-02-26
Attending: ORTHOPAEDIC SURGERY
Payer: COMMERCIAL

## 2024-02-26 PROCEDURE — 97112 NEUROMUSCULAR REEDUCATION: CPT | Performed by: PHYSICAL THERAPIST

## 2024-02-26 PROCEDURE — 97110 THERAPEUTIC EXERCISES: CPT | Performed by: PHYSICAL THERAPIST

## 2024-02-26 PROCEDURE — 97530 THERAPEUTIC ACTIVITIES: CPT | Performed by: PHYSICAL THERAPIST

## 2024-02-26 NOTE — PLAN OF CARE
kinesthetic sense, posture, motor skill, proprioception of scapular, scapulothoracic and UE control with self care, reaching, carrying, lifting, house/yardwork, driving/computer work    Access Code: [email or web address]  URL: [email or web address]  Date: 01/29/2024  Prepared by: Maryann Segura    Exercises  - Doorway Pec Stretch at 90 Degrees Abduction  - 2 x daily - 7 x weekly - 3 sets - 1 reps - 30 hold  - Doorway Pec Stretch at 60 Elevation  - 2 x daily - 7 x weekly - 3 sets - 1 reps - 30 hold  - Shoulder Flexion Wall Slide with Towel  - 2 x daily - 7 x weekly - 1 sets - 10 reps - 10 hold  - Standing Cross Body Shoulder Stretch at Wall  - 2 x daily - 7 x weekly - 1 sets - 10 reps - 10 hold  - Standing Shoulder Internal Rotation Stretch with Towel  - 2 x daily - 7 x weekly - 1 sets - 10 reps - 10 hold  - Supine Shoulder External Rotation with Dowel  - 2 x daily - 7 x weekly - 1 sets - 10 reps - 20-30 hold  - Supine Shoulder External Rotation in Scaption AAROM  - 2 x daily - 7 x weekly - 1 sets - 10 reps - 20-30 hold  - Sleeper Stretch  - 2 x daily - 7 x weekly - 1 sets - 10 reps - 10 hold  - Supine Shoulder Flexion Extension  - 2 x daily - 7 x weekly - 5 sets - 10 reps  - Standing Shoulder Scaption  - 1 x daily - 3 x weekly - 3 sets - 10 reps  - Sidelying Shoulder Abduction Palm Forward  - 1 x daily - 3 x weekly - 3 sets - 10 reps  - Sidelying Shoulder ER with Towel and Dumbbell  - 1 x daily - 3 x weekly - 3 sets - 10 reps  - Shoulder External Rotation with Anchored Resistance  - 1 x daily - 3 x weekly - 3 sets - 10 reps  - Shoulder Internal Rotation with Resistance  - 1 x daily - 3 x weekly - 3 sets - 10 reps  - Scapular Retraction with Resistance  - 1 x daily - 3 x weekly - 3 sets - 10 reps  - Shoulder extension with resistance - Neutral  - 1 x daily - 3 x weekly - 3 sets - 10 reps  - Standing Shoulder Single Arm PNF D2 Extension with Anchored Resistance  - 1 x daily - 3 x weekly - 3 sets - 10 reps  - Standing Bicep

## 2024-03-08 ENCOUNTER — OFFICE VISIT (OUTPATIENT)
Dept: VASCULAR SURGERY | Age: 64
End: 2024-03-08

## 2024-03-08 VITALS — WEIGHT: 158 LBS | HEART RATE: 72 BPM | HEIGHT: 61 IN | BODY MASS INDEX: 29.83 KG/M2

## 2024-03-08 DIAGNOSIS — M79.89 LOCALIZED SWELLING OF BOTH LOWER EXTREMITIES: Primary | ICD-10-CM

## 2024-03-08 ASSESSMENT — ENCOUNTER SYMPTOMS
RESPIRATORY NEGATIVE: 1
GASTROINTESTINAL NEGATIVE: 1
EYES NEGATIVE: 1

## 2024-03-08 NOTE — PROGRESS NOTES
Physical Exam  Vitals and nursing note reviewed.   Constitutional:       General: She is not in acute distress.     Appearance: Normal appearance.   HENT:      Head: Normocephalic and atraumatic.   Cardiovascular:      Rate and Rhythm: Normal rate and regular rhythm.      Pulses:           Radial pulses are 2+ on the right side and 2+ on the left side.        Femoral pulses are 2+ on the right side and 2+ on the left side.       Dorsalis pedis pulses are 2+ on the right side and 2+ on the left side.        Posterior tibial pulses are 2+ on the right side and 2+ on the left side.   Pulmonary:      Effort: Pulmonary effort is normal.   Abdominal:      General: There is no distension.      Palpations: Abdomen is soft.      Tenderness: There is no abdominal tenderness.   Musculoskeletal:         General: Normal range of motion.      Right lower leg: No edema.      Left lower leg: No edema.   Skin:     General: Skin is warm and dry.      Comments:  No significant bilateral lower extremity edema on exam   Neurological:      General: No focal deficit present.      Mental Status: She is alert and oriented to person, place, and time.   Psychiatric:         Mood and Affect: Mood normal.               ASSESSMENT/PLAN:  1. Localized swelling of both lower extremities    Ms. Adair does not appear to have any significant edema on my examination today.  Based on her description of her symptoms I do not feel this is consistent with DVT and may be consistent with some mild chronic venous insufficiency versus mild heart failure.  Patient has noted her symptoms improved since starting a diuretic.  After discussion I recommend that she be more consistent with her compression therapy to help with her symptoms.  I will obtain a venous duplex to assure no acute or chronic DVT or SVT that may be causing or exacerbating her lower extremity edema.  This can be obtained in the next 4 to 6 weeks.  I will plan to see her back in 3 months

## 2024-03-14 ENCOUNTER — TELEPHONE (OUTPATIENT)
Dept: ORTHOPEDIC SURGERY | Age: 64
End: 2024-03-14

## 2024-03-18 ENCOUNTER — OFFICE VISIT (OUTPATIENT)
Dept: ORTHOPEDIC SURGERY | Age: 64
End: 2024-03-18
Payer: COMMERCIAL

## 2024-03-18 VITALS — HEIGHT: 61 IN | BODY MASS INDEX: 28.7 KG/M2 | WEIGHT: 152 LBS

## 2024-03-18 DIAGNOSIS — M75.42 IMPINGEMENT SYNDROME OF LEFT SHOULDER: ICD-10-CM

## 2024-03-18 DIAGNOSIS — M75.102 TEAR OF LEFT ROTATOR CUFF, UNSPECIFIED TEAR EXTENT, UNSPECIFIED WHETHER TRAUMATIC: Primary | ICD-10-CM

## 2024-03-18 PROCEDURE — 99212 OFFICE O/P EST SF 10 MIN: CPT | Performed by: ORTHOPAEDIC SURGERY

## 2024-03-18 NOTE — PROGRESS NOTES
Mikaela Adair returns today 6 months status post left shoulder arthroscopy with rotator cuff repair and bicep tenodesis.  At her last therapy visit February 22 she was discharged to home exercise program.  She was diligent in her attendance and did very well.    Today she reports no pain.  She is continue with her home exercise program.  She is pleased with her progress.    She no longer needs her meloxicam.    General Exam:    Vitals: Height 1.549 m (5' 0.98\"), weight 68.9 kg (152 lb), not currently breastfeeding.  Constitutional: Patient is adequately groomed with no evidence of malnutrition  Mental Status: The patient is oriented to time, place and person.  The patient's mood and affect are appropriate.    She has a little stiffness and full forward elevation at 170 degrees but otherwise has symmetric range of motion of both shoulders.  She has good strength throughout and no tenderness.    Assessment: Doing well 6-month status post left rotator cuff repair.    Plan: I encouraged her to continue with her home exercise program.  Follow-up with me on an as-needed basis.

## 2024-03-25 RX ORDER — LANSOPRAZOLE 30 MG
30 CAPSULE,DELAYED RELEASE (ENTERIC COATED) ORAL DAILY
Qty: 90 CAPSULE | Refills: 3 | Status: SHIPPED | OUTPATIENT
Start: 2024-03-25

## 2024-03-25 RX ORDER — LANSOPRAZOLE 30 MG/1
30 CAPSULE, DELAYED RELEASE ORAL DAILY
Qty: 90 CAPSULE | Status: CANCELLED | OUTPATIENT
Start: 2024-03-25

## 2024-03-25 NOTE — TELEPHONE ENCOUNTER
Pt needs a letter sent to the pharmacy along with the prescription saying that she needs the brand name medication, not the generic version.

## 2024-03-28 ENCOUNTER — PROCEDURE VISIT (OUTPATIENT)
Dept: SURGERY | Age: 64
End: 2024-03-28

## 2024-03-28 DIAGNOSIS — M79.89 LOCALIZED SWELLING OF BOTH LOWER EXTREMITIES: Primary | ICD-10-CM

## 2024-04-13 DIAGNOSIS — Z79.899 MEDICATION MANAGEMENT: ICD-10-CM

## 2024-04-13 DIAGNOSIS — E06.3 HYPOTHYROIDISM DUE TO HASHIMOTO'S THYROIDITIS: ICD-10-CM

## 2024-04-13 DIAGNOSIS — E55.9 VITAMIN D DEFICIENCY: ICD-10-CM

## 2024-04-13 DIAGNOSIS — M15.9 PRIMARY OSTEOARTHRITIS INVOLVING MULTIPLE JOINTS: ICD-10-CM

## 2024-04-13 DIAGNOSIS — E03.8 HYPOTHYROIDISM DUE TO HASHIMOTO'S THYROIDITIS: ICD-10-CM

## 2024-04-13 LAB
25(OH)D3 SERPL-MCNC: 108.3 NG/ML
ALBUMIN SERPL-MCNC: 4.7 G/DL (ref 3.4–5)
ALBUMIN/GLOB SERPL: 1.9 {RATIO} (ref 1.1–2.2)
ALP SERPL-CCNC: 96 U/L (ref 40–129)
ALT SERPL-CCNC: 19 U/L (ref 10–40)
ANION GAP SERPL CALCULATED.3IONS-SCNC: 11 MMOL/L (ref 3–16)
AST SERPL-CCNC: 28 U/L (ref 15–37)
BASOPHILS # BLD: 0.1 K/UL (ref 0–0.2)
BASOPHILS NFR BLD: 1.2 %
BILIRUB SERPL-MCNC: 0.5 MG/DL (ref 0–1)
BUN SERPL-MCNC: 10 MG/DL (ref 7–20)
CALCIUM SERPL-MCNC: 10.4 MG/DL (ref 8.3–10.6)
CHLORIDE SERPL-SCNC: 103 MMOL/L (ref 99–110)
CO2 SERPL-SCNC: 26 MMOL/L (ref 21–32)
CREAT SERPL-MCNC: 0.7 MG/DL (ref 0.6–1.2)
DEPRECATED RDW RBC AUTO: 13.5 % (ref 12.4–15.4)
EOSINOPHIL # BLD: 0.2 K/UL (ref 0–0.6)
EOSINOPHIL NFR BLD: 2.2 %
GFR SERPLBLD CREATININE-BSD FMLA CKD-EPI: >90 ML/MIN/{1.73_M2}
GLUCOSE SERPL-MCNC: 92 MG/DL (ref 70–99)
HCT VFR BLD AUTO: 42.2 % (ref 36–48)
HGB BLD-MCNC: 14.5 G/DL (ref 12–16)
LYMPHOCYTES # BLD: 2.1 K/UL (ref 1–5.1)
LYMPHOCYTES NFR BLD: 28 %
MCH RBC QN AUTO: 29.7 PG (ref 26–34)
MCHC RBC AUTO-ENTMCNC: 34.4 G/DL (ref 31–36)
MCV RBC AUTO: 86.4 FL (ref 80–100)
MONOCYTES # BLD: 0.6 K/UL (ref 0–1.3)
MONOCYTES NFR BLD: 8.2 %
NEUTROPHILS # BLD: 4.5 K/UL (ref 1.7–7.7)
NEUTROPHILS NFR BLD: 60.4 %
PLATELET # BLD AUTO: 321 K/UL (ref 135–450)
PMV BLD AUTO: 8.8 FL (ref 5–10.5)
POTASSIUM SERPL-SCNC: 5.2 MMOL/L (ref 3.5–5.1)
PROT SERPL-MCNC: 7.2 G/DL (ref 6.4–8.2)
RBC # BLD AUTO: 4.88 M/UL (ref 4–5.2)
SODIUM SERPL-SCNC: 140 MMOL/L (ref 136–145)
T3FREE SERPL-MCNC: 4.3 PG/ML (ref 2.3–4.2)
T4 FREE SERPL-MCNC: 1.1 NG/DL (ref 0.9–1.8)
TSH SERPL DL<=0.005 MIU/L-ACNC: 0.1 UIU/ML (ref 0.27–4.2)
WBC # BLD AUTO: 7.4 K/UL (ref 4–11)

## 2024-04-16 DIAGNOSIS — E87.5 HYPERKALEMIA: Primary | ICD-10-CM

## 2024-05-04 DIAGNOSIS — E87.5 HYPERKALEMIA: ICD-10-CM

## 2024-05-04 LAB — POTASSIUM SERPL-SCNC: 4.6 MMOL/L (ref 3.5–5.1)

## 2024-05-06 ENCOUNTER — TELEPHONE (OUTPATIENT)
Dept: VASCULAR SURGERY | Age: 64
End: 2024-05-06

## 2024-05-06 RX ORDER — FUROSEMIDE 20 MG/1
20 TABLET ORAL DAILY
Qty: 30 TABLET | Refills: 2 | Status: SHIPPED | OUTPATIENT
Start: 2024-05-06

## 2024-05-06 RX ORDER — FUROSEMIDE 20 MG/1
20 TABLET ORAL DAILY
Qty: 30 TABLET | Refills: 2 | OUTPATIENT
Start: 2024-05-06

## 2024-05-06 NOTE — TELEPHONE ENCOUNTER
Patient called and got her appt rescheduled.  She stated she isn't having any new issues, but she wants to know what the next step is because the swelling hasn't gone down.  A water pill has helped a little bit, but she is still having the swelling.  She can not wear the compression socks because where the band is kills her leg/knee.

## 2024-05-06 NOTE — TELEPHONE ENCOUNTER
Called patient and LVM.  She is not due for her 3 month follow up.  If she is not having any new issues, she can be scheduled in June, but she is having issues we can keep her on for today.

## 2024-05-15 ENCOUNTER — OFFICE VISIT (OUTPATIENT)
Dept: FAMILY MEDICINE CLINIC | Age: 64
End: 2024-05-15

## 2024-05-15 VITALS
HEART RATE: 76 BPM | SYSTOLIC BLOOD PRESSURE: 106 MMHG | OXYGEN SATURATION: 95 % | WEIGHT: 149.6 LBS | BODY MASS INDEX: 28.25 KG/M2 | HEIGHT: 61 IN | DIASTOLIC BLOOD PRESSURE: 78 MMHG | TEMPERATURE: 97.8 F

## 2024-05-15 DIAGNOSIS — K21.9 GASTROESOPHAGEAL REFLUX DISEASE, UNSPECIFIED WHETHER ESOPHAGITIS PRESENT: ICD-10-CM

## 2024-05-15 DIAGNOSIS — R22.43 LOCALIZED SWELLING OF BOTH LOWER LEGS: ICD-10-CM

## 2024-05-15 DIAGNOSIS — R92.8 ABNORMAL MAMMOGRAM OF RIGHT BREAST: ICD-10-CM

## 2024-05-15 DIAGNOSIS — Z00.00 PHYSICAL EXAM: Primary | ICD-10-CM

## 2024-05-15 DIAGNOSIS — Z12.31 ENCOUNTER FOR SCREENING MAMMOGRAM FOR MALIGNANT NEOPLASM OF BREAST: ICD-10-CM

## 2024-05-15 DIAGNOSIS — B00.9 RECURRENT HERPES SIMPLEX: ICD-10-CM

## 2024-05-15 DIAGNOSIS — F51.04 CHRONIC INSOMNIA: ICD-10-CM

## 2024-05-15 DIAGNOSIS — K31.7 GASTRIC POLYPS: ICD-10-CM

## 2024-05-15 RX ORDER — LANSOPRAZOLE 30 MG/1
30 CAPSULE, DELAYED RELEASE ORAL DAILY
Qty: 90 CAPSULE | Refills: 1 | Status: SHIPPED | OUTPATIENT
Start: 2024-05-15

## 2024-05-15 RX ORDER — VALACYCLOVIR HYDROCHLORIDE 500 MG/1
500 TABLET, FILM COATED ORAL DAILY
Qty: 90 TABLET | Refills: 1 | Status: SHIPPED | OUTPATIENT
Start: 2024-05-15

## 2024-05-15 RX ORDER — SUVOREXANT 10 MG/1
10 TABLET, FILM COATED ORAL NIGHTLY
Qty: 30 TABLET | Refills: 0 | Status: SHIPPED | OUTPATIENT
Start: 2024-05-15 | End: 2024-06-14

## 2024-05-15 SDOH — ECONOMIC STABILITY: FOOD INSECURITY: WITHIN THE PAST 12 MONTHS, THE FOOD YOU BOUGHT JUST DIDN'T LAST AND YOU DIDN'T HAVE MONEY TO GET MORE.: NEVER TRUE

## 2024-05-15 SDOH — ECONOMIC STABILITY: HOUSING INSECURITY
IN THE LAST 12 MONTHS, WAS THERE A TIME WHEN YOU DID NOT HAVE A STEADY PLACE TO SLEEP OR SLEPT IN A SHELTER (INCLUDING NOW)?: NO

## 2024-05-15 SDOH — ECONOMIC STABILITY: FOOD INSECURITY: WITHIN THE PAST 12 MONTHS, YOU WORRIED THAT YOUR FOOD WOULD RUN OUT BEFORE YOU GOT MONEY TO BUY MORE.: NEVER TRUE

## 2024-05-15 SDOH — ECONOMIC STABILITY: INCOME INSECURITY: HOW HARD IS IT FOR YOU TO PAY FOR THE VERY BASICS LIKE FOOD, HOUSING, MEDICAL CARE, AND HEATING?: NOT HARD AT ALL

## 2024-05-15 ASSESSMENT — ENCOUNTER SYMPTOMS
DIARRHEA: 0
CONSTIPATION: 1
SHORTNESS OF BREATH: 0
VOMITING: 0
NAUSEA: 0
BLOOD IN STOOL: 0
SINUS PRESSURE: 0
ROS SKIN COMMENTS: NO CONCERNING SKIN LESIONS AND SEES DERM YEARLY
COUGH: 0

## 2024-05-15 NOTE — PROGRESS NOTES
Oropharynx is clear.   Eyes:      Conjunctiva/sclera: Conjunctivae normal.   Neck:      Vascular: No carotid bruit.   Cardiovascular:      Rate and Rhythm: Normal rate and regular rhythm.      Heart sounds: Murmur (systolic) heard.      Systolic murmur is present with a grade of 1/6.      No gallop.   Pulmonary:      Effort: Pulmonary effort is normal.      Breath sounds: Normal breath sounds. No rales.   Abdominal:      General: There is no distension.      Palpations: Abdomen is soft.      Tenderness: There is no abdominal tenderness.   Musculoskeletal:      Cervical back: Neck supple.      Right lower leg: Edema (tr) present.      Left lower leg: Edema (tr) present.   Lymphadenopathy:      Cervical: No cervical adenopathy.   Skin:     General: Skin is warm and dry.   Neurological:      General: No focal deficit present.      Mental Status: She is alert.      Gait: Gait normal.   Psychiatric:         Mood and Affect: Mood normal.         Behavior: Behavior normal.         Thought Content: Thought content normal.         ASSESSMENT/PLAN:    Mikaela was seen today for annual exam and medication refill.    Diagnoses and all orders for this visit:    Physical exam    Localized swelling of both lower legs lasix  elevate legs  decrease salt     Gastric polyps following with GI ? Due to the PPI     Gastroesophageal reflux disease, unspecified whether esophagitis present  Following with GI with this and polyps   -     lansoprazole (PREVACID) 30 MG delayed release capsule; Take 1 capsule by mouth daily    Recurrent herpes simplex  -     valACYclovir (VALTREX) 500 MG tablet; Take 1 tablet by mouth daily    Chronic insomnia try new agent   -     Suvorexant (BELSOMRA) 10 MG TABS; Take 10 mg by mouth at bedtime for 30 days. Max Daily Amount: 10 mg    Abnormal mammogram of right breast  -     [facility] DIGITAL DIAGNOSTIC W OR WO CAD BILATERAL; Future    Encounter for screening mammogram for malignant neoplasm of breast  -     [facility]

## 2024-06-04 ENCOUNTER — HOSPITAL ENCOUNTER (OUTPATIENT)
Dept: WOMENS IMAGING | Age: 64
Discharge: HOME | End: 2024-06-04
Payer: COMMERCIAL

## 2024-06-04 DIAGNOSIS — Z12.31 SCREENING MAMMOGRAM FOR BREAST CANCER: ICD-10-CM

## 2024-06-04 PROCEDURE — 77067 SCR MAMMO BI INCL CAD: CPT

## 2024-06-07 ENCOUNTER — OFFICE VISIT (OUTPATIENT)
Dept: FAMILY MEDICINE CLINIC | Age: 64
End: 2024-06-07
Payer: COMMERCIAL

## 2024-06-07 VITALS
HEIGHT: 61 IN | DIASTOLIC BLOOD PRESSURE: 70 MMHG | SYSTOLIC BLOOD PRESSURE: 138 MMHG | TEMPERATURE: 97.5 F | WEIGHT: 147.8 LBS | OXYGEN SATURATION: 98 % | HEART RATE: 66 BPM | BODY MASS INDEX: 27.9 KG/M2

## 2024-06-07 DIAGNOSIS — K21.9 GASTROESOPHAGEAL REFLUX DISEASE, UNSPECIFIED WHETHER ESOPHAGITIS PRESENT: ICD-10-CM

## 2024-06-07 DIAGNOSIS — L73.9 FOLLICULITIS: Primary | ICD-10-CM

## 2024-06-07 PROCEDURE — 1123F ACP DISCUSS/DSCN MKR DOCD: CPT | Performed by: NURSE PRACTITIONER

## 2024-06-07 PROCEDURE — 99213 OFFICE O/P EST LOW 20 MIN: CPT | Performed by: NURSE PRACTITIONER

## 2024-06-07 RX ORDER — LANSOPRAZOLE 30 MG/1
30 CAPSULE, DELAYED RELEASE ORAL DAILY
Qty: 90 CAPSULE | Refills: 1 | Status: SHIPPED | OUTPATIENT
Start: 2024-06-07

## 2024-06-07 ASSESSMENT — ENCOUNTER SYMPTOMS
COUGH: 0
ABDOMINAL PAIN: 0

## 2024-06-07 NOTE — PROGRESS NOTES
Take 1 tablet by mouth daily 90 tablet 1    Suvorexant (BELSOMRA) 10 MG TABS Take 10 mg by mouth at bedtime for 30 days. Max Daily Amount: 10 mg 30 tablet 0    furosemide (LASIX) 20 MG tablet TAKE 1 TABLET BY MOUTH DAILY 30 tablet 2    Multiple Vitamin (MULTIVITAMIN ADULT PO) Take 1 tablet by mouth daily      NONFORMULARY Take 1 tablet by mouth daily PROCITE-D -B-6-FOLATE-B-12-SELENIUM-      Methylsulfonylmethane (MSM PO) Take 830 mg by mouth daily      NONFORMULARY Take 2 capsules by mouth at bedtime PHENIBUT HCI-TAKES 2 CAPS OF 300MG      cetirizine (ZYRTEC) 10 MG tablet Take 1 tablet by mouth daily      Probiotic Product (PROBIOTIC PO) Take 1 capsule by mouth daily      Hormone Cream Base (HRT CREAM BASE) CREA Place 1 application  vaginally at bedtime      polyethylene glycol (GLYCOLAX) packet Take 1 packet by mouth daily      Cyanocobalamin (VITAMIN B-12) 2500 MCG SUBL Place 1 tablet under the tongue daily      Cholecalciferol (VITAMIN D3) 5000 units TABS Take 1 tablet by mouth daily      NONFORMULARY Take 1 capsule by mouth daily ProGad Enhancer      NONFORMULARY Take 1 tablet by mouth daily L-5MTF-daily      NONFORMULARY Take 1 capsule by mouth daily Pro DHA 1000-daily      NONFORMULARY Take 1 tablet by mouth daily A-CYSTEINE/ARG ZN/GLUT/MV-MN (L GLUTAMINE-N ACET-ARG-VIT-MIN ORAL)-daily       No current facility-administered medications for this visit.        Past Medical History:   Diagnosis Date    Allergic rhinitis     Anxiety     Arthritis     Cholestasis during pregnancy     cholestasis of pregnancy over 25 years ago     Chronic constipation     Chronic lymphocytic thyroiditis     COVID-19 12/2021    GERD (gastroesophageal reflux disease)     Gluten intolerance     Heart murmur     Hyperlipidemia     NO MEDS    Hypothyroidism     Irritable bowel syndrome     Multiple gastric polyps     Polyp, stomach     Rotator cuff injury 08/2023    left    Seasonal allergies         Review of Systems   Constitutional:

## 2024-06-10 ENCOUNTER — OFFICE VISIT (OUTPATIENT)
Dept: VASCULAR SURGERY | Age: 64
End: 2024-06-10
Payer: COMMERCIAL

## 2024-06-10 VITALS — WEIGHT: 147 LBS | BODY MASS INDEX: 28.86 KG/M2 | HEART RATE: 67 BPM | HEIGHT: 60 IN

## 2024-06-10 DIAGNOSIS — M79.89 LOCALIZED SWELLING OF BOTH LOWER EXTREMITIES: Primary | ICD-10-CM

## 2024-06-10 PROCEDURE — 1123F ACP DISCUSS/DSCN MKR DOCD: CPT | Performed by: SURGERY

## 2024-06-10 PROCEDURE — 99214 OFFICE O/P EST MOD 30 MIN: CPT | Performed by: SURGERY

## 2024-06-10 NOTE — PROGRESS NOTES
!  +------------------------+------+------+------------+  !Popliteal               !Phasic!No    !            !  +------------------------+------+------+------------+     Left Lower Extremities DVT Study Measurements  Left 2D Measurements  +------------------------+----------+---------------+----------+  !Location                !Visualized!Compressibility!Thrombosis!  +------------------------+----------+---------------+----------+  !Sapheno Femoral Junction!Yes       !Yes            !None      !  +------------------------+----------+---------------+----------+  !GSV Thigh               !Yes       !Yes            !None      !  +------------------------+----------+---------------+----------+  !Common Femoral          !Yes       !Yes            !None      !  +------------------------+----------+---------------+----------+  !Prox Femoral            !Yes       !Yes            !None      !  +------------------------+----------+---------------+----------+  !Mid Femoral             !Yes       !Yes            !None      !  +------------------------+----------+---------------+----------+  !Dist Femoral            !Yes       !Yes            !None      !  +------------------------+----------+---------------+----------+  !Deep Femoral            !Yes       !Yes            !None      !  +------------------------+----------+---------------+----------+  !Popliteal               !Yes       !Yes            !None      !  +------------------------+----------+---------------+----------+  !Gastroc                 !Yes       !Yes            !None      !  +------------------------+----------+---------------+----------+  !PTV                     !Yes       !Yes            !None      !  +------------------------+----------+---------------+----------+  !Peroneal                !Yes       !Yes            !None      !  +------------------------+----------+---------------+----------+  !GSV Calf                !Yes       !Yes            !None

## 2024-06-11 DIAGNOSIS — K21.9 GASTROESOPHAGEAL REFLUX DISEASE, UNSPECIFIED WHETHER ESOPHAGITIS PRESENT: ICD-10-CM

## 2024-06-11 RX ORDER — LANSOPRAZOLE 30 MG/1
30 CAPSULE, DELAYED RELEASE ORAL DAILY
Qty: 90 CAPSULE | Refills: 1 | OUTPATIENT
Start: 2024-06-11

## 2024-06-11 RX ORDER — LANSOPRAZOLE 30 MG/1
30 CAPSULE, DELAYED RELEASE ORAL DAILY
Qty: 90 CAPSULE | Refills: 1 | Status: SHIPPED | OUTPATIENT
Start: 2024-06-11

## 2024-06-11 NOTE — TELEPHONE ENCOUNTER
YASMANI patient regarding this RX request. Patient cannot take the generic and insurance is filling this incorrectly. New RX states GABI. Patient expressed understanding.

## 2024-06-11 NOTE — TELEPHONE ENCOUNTER
Please send new script for GABI for only Prevacid not lansoprazole. Must have this GABI on it or insurance will not cover it.

## 2024-07-01 ENCOUNTER — TELEPHONE (OUTPATIENT)
Dept: FAMILY MEDICINE CLINIC | Age: 64
End: 2024-07-01

## 2024-07-01 RX ORDER — TRAZODONE HYDROCHLORIDE 50 MG/1
50 TABLET ORAL NIGHTLY PRN
Qty: 30 TABLET | Refills: 2 | Status: SHIPPED | OUTPATIENT
Start: 2024-07-01

## 2024-07-01 NOTE — TELEPHONE ENCOUNTER
Regarding: Sleeping medication - Need new alternative  Contact: [phone]  ----- Message from Maria Teresa Galarza MA sent at 7/1/2024  9:22 AM EDT -----       ----- Message from Mikaela Adair \"Lakshmi\" to Imelda Irving MD sent at 7/1/2024  7:30 AM -----   At my last appointment in May,  Dr. Chandler prescribed Belsomra and the pharmacy informed me that my insurance company will not cover it and it's super expensive. Is there another alternative that she can prescribe? The supplement that I was previously taking were working okay, but have recently been discontinued. I'm really struggling with sleeping and appreciate if she can suggest another medication to try out. Thanks.

## 2024-08-05 ENCOUNTER — TELEPHONE (OUTPATIENT)
Dept: FAMILY MEDICINE CLINIC | Age: 64
End: 2024-08-05

## 2024-08-05 DIAGNOSIS — E06.3 HYPOTHYROIDISM DUE TO HASHIMOTO'S THYROIDITIS: Primary | ICD-10-CM

## 2024-08-05 DIAGNOSIS — E03.8 HYPOTHYROIDISM DUE TO HASHIMOTO'S THYROIDITIS: Primary | ICD-10-CM

## 2024-08-05 NOTE — TELEPHONE ENCOUNTER
----- Message from Mikaela Adair sent at 8/5/2024 10:21 AM EDT -----  Regarding: Need referral please  Contact: [phone]  I'm having several Thyroid related issues and would appreciate a referral from Dr Irving. I am trying to make an appointment with this doctor: Brittni Loza MD   Health Provider       He comes highly recommended from a friend of mine who has had similar issues. She has reached out to him to let him know I'm trying to make an appointment and when you send the referral, if you request that I be seen ASAP I would GREATLY appreciate it.  I cannot make an appt with him until I have a referral from my PCP.     Their fax number for the referral is: [phone].     Please let me know when you've sent the referral so I can call to schedule an appointment.  Thanks so much!

## 2024-08-06 ENCOUNTER — HOSPITAL ENCOUNTER (EMERGENCY)
Age: 64
Discharge: HOME | End: 2024-08-06
Attending: EMERGENCY MEDICINE
Payer: COMMERCIAL

## 2024-08-06 ENCOUNTER — HOSPITAL ENCOUNTER (OUTPATIENT)
Dept: CT IMAGING | Age: 64
Discharge: HOME | End: 2024-08-06
Payer: COMMERCIAL

## 2024-08-06 VITALS
SYSTOLIC BLOOD PRESSURE: 167 MMHG | RESPIRATION RATE: 18 BRPM | HEART RATE: 88 BPM | OXYGEN SATURATION: 97 % | DIASTOLIC BLOOD PRESSURE: 92 MMHG | TEMPERATURE: 98.2 F

## 2024-08-06 DIAGNOSIS — J31.0 CHRONIC RHINITIS: ICD-10-CM

## 2024-08-06 DIAGNOSIS — R13.19 ESOPHAGEAL DYSPHAGIA: Primary | ICD-10-CM

## 2024-08-06 PROCEDURE — 6370000000 HC RX 637 (ALT 250 FOR IP): Performed by: EMERGENCY MEDICINE

## 2024-08-06 PROCEDURE — 70486 CT MAXILLOFACIAL W/O DYE: CPT

## 2024-08-06 PROCEDURE — 99283 EMERGENCY DEPT VISIT LOW MDM: CPT

## 2024-08-06 RX ADMIN — Medication: at 08:12

## 2024-08-06 ASSESSMENT — PAIN SCALES - GENERAL: PAINLEVEL_OUTOF10: 1

## 2024-08-06 ASSESSMENT — PAIN DESCRIPTION - LOCATION: LOCATION: THROAT

## 2024-08-06 NOTE — ED NOTES
Provider order placed for patient's discharge. Provider reviewed decision to discharge with the patient. Discharge paperwork and any prescriptions were reviewed with the patient. Patient verbalized understanding of discharge education and any prescriptions and has no further questions or further needs at this time. Patient left with all personal belongings and was stable upon departure. Patient thanked for choosing [facility] and informed to return should any need arise.    
no

## 2024-08-06 NOTE — ED PROVIDER NOTES
tenderness.  Peripheral pulses all intact  Skin: No skin lesions.  No rashes  Neurologic: Cranial nerves II through XII was grossly intact.  Nonfocal neurological exam  Psychiatric: Patient is pleasant.  Mood is appropriate.        DIAGNOSTIC RESULTS     EKG (Per Emergency Physician):       RADIOLOGY (Per Emergency Physician):       Interpretation per the Radiologist below, if available at the time of this note:  No results found.    ED BEDSIDE ULTRASOUND:   Performed by ED Physician - none    LABS:  Labs Reviewed - No data to display     All other labs were within normal range or not returned as of this dictation.      Procedures      EMERGENCY DEPARTMENT COURSE and DIFFERENTIAL DIAGNOSIS/MDM:   Vitals:    Vitals:    08/06/24 0745   BP: (!) 167/92   Pulse: 88   Resp: 18   Temp: 98.2 °F (36.8 °C)   TempSrc: Oral   SpO2: 97%       Medications   aluminum & magnesium hydroxide-simethicone (MAALOX) 30 mL, lidocaine viscous hcl (XYLOCAINE) 5 mL (GI COCKTAIL) ( Oral Given 8/6/24 0812)       MDM  .  Patient is a healthy 64-year-old with esophageal dysphagia status post 1 week of EGD with stretching.  Patient continues to have intermittent dysphagia.  Patient however is handling her secretions well her exam is benign.  We will try a GI cocktail.  I suspect may be some intermittent spasms in the flexural esophageal muscle versus some irritation.  I do not think there is a food bolus or any type of obstruction.  Patient is talking speaking full sentences able to handle her secretions.  Patient was given a GI cocktail which helped some of her symptoms.  Patient still states there is a little area in the back of her throat in the mid esophagus that does not feel right.  Differential includes esophageal spasm, GERD, dysphagia, food bolus, mid esophageal irritation patient will need to follow-up with GI specialist may need direct visualization no x-ray will help at this point.  I do not think this is a food bolus.  Patient is

## 2024-08-06 NOTE — DISCHARGE INSTRUCTIONS
Continue your medication.  Take Prevacid or Prilosec as directed.  Follow-up with your GI specialist this week.  Please call for appointment.

## 2025-04-02 SDOH — HEALTH STABILITY: PHYSICAL HEALTH: ON AVERAGE, HOW MANY MINUTES DO YOU ENGAGE IN EXERCISE AT THIS LEVEL?: 40 MIN

## 2025-04-02 SDOH — HEALTH STABILITY: PHYSICAL HEALTH: ON AVERAGE, HOW MANY DAYS PER WEEK DO YOU ENGAGE IN MODERATE TO STRENUOUS EXERCISE (LIKE A BRISK WALK)?: 2 DAYS

## 2025-04-03 ENCOUNTER — OFFICE VISIT (OUTPATIENT)
Dept: ORTHOPEDIC SURGERY | Age: 65
End: 2025-04-03
Payer: MEDICARE

## 2025-04-03 VITALS — BODY MASS INDEX: 26.7 KG/M2 | HEIGHT: 60 IN | WEIGHT: 136 LBS

## 2025-04-03 DIAGNOSIS — M62.830 SPASM OF LEFT TRAPEZIUS MUSCLE: ICD-10-CM

## 2025-04-03 DIAGNOSIS — M25.512 LEFT SHOULDER PAIN, UNSPECIFIED CHRONICITY: Primary | ICD-10-CM

## 2025-04-03 PROCEDURE — 99214 OFFICE O/P EST MOD 30 MIN: CPT | Performed by: ORTHOPAEDIC SURGERY

## 2025-04-03 PROCEDURE — 1123F ACP DISCUSS/DSCN MKR DOCD: CPT | Performed by: ORTHOPAEDIC SURGERY

## 2025-04-03 PROCEDURE — 1125F AMNT PAIN NOTED PAIN PRSNT: CPT | Performed by: ORTHOPAEDIC SURGERY

## 2025-04-03 RX ORDER — METOCLOPRAMIDE 5 MG/1
5 TABLET ORAL 4 TIMES DAILY
COMMUNITY
Start: 2025-02-21

## 2025-04-03 NOTE — PROGRESS NOTES
Mikaela Adair is seen today for evaluation and treatment of left shoulder pain.    I did a left shoulder rotator cuff repair and bicep tenodesis September 2023.  At her last visit 1 year ago she was reportedly doing well.        Today she reports that over the last couple of months she has had increasing discomfort in the left upper trap with stiffness and burning pain.  It also sometimes hurts in the lateral and anterior shoulder.  Pain can be occasionally as bad as 5 out of 10.    She does not recall trauma.    Recently she has gone through major issues with dental work and is seeing an oncologist soon for some gastrointestinal issues.    Past medical history also significant for hypothyroid.    History: Patient's relevant past family, medical, and social history are reviewed as part of today's visit. ROS of pertinent positives and negatives as above; otherwise negative.    General Exam:    Vitals: Height 1.524 m (5'), weight 61.7 kg (136 lb), not currently breastfeeding.  Constitutional: Patient is adequately groomed with no evidence of malnutrition  Mental Status: The patient is oriented to time, place and person.  The patient's mood and affect are appropriate.  Gait:  Patient walks with normal gait and station.  Lymphatic: The lymphatic examination bilaterally reveals all areas to be without enlargement or induration.  Vascular: Examination reveals no swelling or calf tenderness.  Peripheral pulses are palpable and 2+.  Neurological: The patient has good coordination.  There is no weakness or sensory deficit.    Skin:    Head/Neck: inspection reveals no rashes, ulcerations or lesions.  Trunk:  inspection reveals no rashes, ulcerations or lesions.  Right Lower Extremity: inspection reveals no rashes, ulcerations or lesions.  Left Lower Extremity: inspection reveals no rashes, ulcerations or lesions.    Cervical spine examination demonstrates stiffness and elevation and rotation to the left.    Right

## 2025-04-04 ENCOUNTER — PATIENT MESSAGE (OUTPATIENT)
Dept: FAMILY MEDICINE CLINIC | Age: 65
End: 2025-04-04

## 2025-04-05 RX ORDER — LANSOPRAZOLE 30 MG/1
30 CAPSULE, DELAYED RELEASE ORAL DAILY
Qty: 90 CAPSULE | Refills: 1 | Status: SHIPPED | OUTPATIENT
Start: 2025-04-05 | End: 2025-04-11 | Stop reason: SDUPTHER

## 2025-04-07 RX ORDER — LANSOPRAZOLE 30 MG
30 CAPSULE,DELAYED RELEASE (ENTERIC COATED) ORAL DAILY
Qty: 90 CAPSULE | Refills: 1 | Status: SHIPPED | OUTPATIENT
Start: 2025-04-07

## 2025-04-11 RX ORDER — LANSOPRAZOLE 30 MG/1
30 CAPSULE, DELAYED RELEASE ORAL DAILY
Qty: 90 CAPSULE | Refills: 1 | Status: SHIPPED | OUTPATIENT
Start: 2025-04-11

## 2025-05-02 ENCOUNTER — HOSPITAL ENCOUNTER (OUTPATIENT)
Dept: PHYSICAL THERAPY | Age: 65
Setting detail: THERAPIES SERIES
Discharge: HOME | End: 2025-05-02
Attending: ORTHOPAEDIC SURGERY
Payer: MEDICARE

## 2025-05-02 DIAGNOSIS — M62.81 MUSCLE WEAKNESS OF LEFT UPPER EXTREMITY: Primary | ICD-10-CM

## 2025-05-02 DIAGNOSIS — M25.512 LEFT SHOULDER PAIN, UNSPECIFIED CHRONICITY: ICD-10-CM

## 2025-05-02 DIAGNOSIS — R26.89 DECREASED FUNCTIONAL MOBILITY: ICD-10-CM

## 2025-05-02 DIAGNOSIS — M62.9 MUSCULOSKELETAL DISORDER INVOLVING UPPER TRAPEZIUS MUSCLE: ICD-10-CM

## 2025-05-02 PROCEDURE — 97112 NEUROMUSCULAR REEDUCATION: CPT | Performed by: PHYSICAL THERAPIST

## 2025-05-02 PROCEDURE — 97161 PT EVAL LOW COMPLEX 20 MIN: CPT | Performed by: PHYSICAL THERAPIST

## 2025-05-02 PROCEDURE — 97530 THERAPEUTIC ACTIVITIES: CPT | Performed by: PHYSICAL THERAPIST

## 2025-05-02 NOTE — PLAN OF CARE
[facility]: [address], [place] [address] office: [phone] fax: [phone]     Physical Therapy Initial Evaluation Certification      Dear Rian Lu MD ,    We had the pleasure of evaluating the following patient for physical therapy services at [facility].  A summary of our findings can be found in the initial assessment below.  This includes our plan of care.  If you have any questions or concerns regarding these findings, please do not hesitate to contact me at the office phone number listed above.  Thank you for the referral.   Recommend potentially dry needling with pt. Please let me know if this not approved by referring provider.     Physician Signature:_______________________________Date:__________________  By signing above (or electronic signature), therapist’s plan is approved by physician       Physical Therapy: TREATMENT/PROGRESS NOTE   Patient: Mikaela Adair (65 y.o. female)   Examination Date: 2025   :  1960 MRN: [id number]   Visit #: 1   Insurance Allowable Auth Needed   [place] []Yes    [x]No    Insurance: Payor: [facility] MEDICARE / [facility] [place] [facility] / Product Type: *No Product type* /   Insurance ID: [id number] - (Medicare Managed)  Secondary Insurance (if applicable):    Treatment Diagnosis:     ICD-10-CM    1. Muscle weakness of left upper extremity  M62.81       2. Left shoulder pain, unspecified chronicity  M25.512       3. Decreased functional mobility  R26.89       4. Musculoskeletal disorder involving upper trapezius muscle  M62.9          Medical Diagnosis:  Left shoulder pain, unspecified chronicity [M25.512]  Spasm of left trapezius muscle [M62.830]   Referring Physician: Rian uL MD  PCP: Imelda Irving MD     Plan of care signed (Y/N):     Date of Patient follow up with Physician: as needed for ongoing issues     Plan of Care Report: EVAL today  POC

## 2025-05-13 ENCOUNTER — TELEPHONE (OUTPATIENT)
Dept: FAMILY MEDICINE CLINIC | Age: 65
End: 2025-05-13

## 2025-05-13 ENCOUNTER — PATIENT MESSAGE (OUTPATIENT)
Dept: FAMILY MEDICINE CLINIC | Age: 65
End: 2025-05-13

## 2025-05-13 DIAGNOSIS — R92.333 HETEROGENEOUSLY DENSE TISSUE OF BOTH BREASTS ON MAMMOGRAPHY: ICD-10-CM

## 2025-05-13 DIAGNOSIS — Z12.31 ENCOUNTER FOR SCREENING MAMMOGRAM FOR MALIGNANT NEOPLASM OF BREAST: Primary | ICD-10-CM

## 2025-05-13 NOTE — TELEPHONE ENCOUNTER
Could Dr. Irving submit an order to [facility] for my annual mammogram? This is what she’s ordered previously: Adolfo Popeye digital screen bilateral. Please let me know once that’s completed so I can call for an appointment. Thanks!

## 2025-05-15 ENCOUNTER — APPOINTMENT (OUTPATIENT)
Dept: PHYSICAL THERAPY | Age: 65
End: 2025-05-15
Attending: ORTHOPAEDIC SURGERY
Payer: MEDICARE

## 2025-06-09 ENCOUNTER — RESULTS FOLLOW-UP (OUTPATIENT)
Dept: FAMILY MEDICINE CLINIC | Age: 65
End: 2025-06-09

## 2025-06-09 ENCOUNTER — HOSPITAL ENCOUNTER (OUTPATIENT)
Dept: WOMENS IMAGING | Age: 65
Discharge: HOME | End: 2025-06-09
Attending: INTERNAL MEDICINE
Payer: MEDICARE

## 2025-06-09 VITALS — WEIGHT: 135 LBS | HEIGHT: 61 IN | BODY MASS INDEX: 25.49 KG/M2

## 2025-06-09 DIAGNOSIS — R92.333 HETEROGENEOUSLY DENSE TISSUE OF BOTH BREASTS ON MAMMOGRAPHY: ICD-10-CM

## 2025-06-09 DIAGNOSIS — Z12.31 ENCOUNTER FOR SCREENING MAMMOGRAM FOR MALIGNANT NEOPLASM OF BREAST: ICD-10-CM

## 2025-06-09 PROCEDURE — 77063 BREAST TOMOSYNTHESIS BI: CPT

## (undated) DEVICE — MERCY HEALTH WEST TURNOVER: Brand: MEDLINE INDUSTRIES, INC.

## (undated) DEVICE — STOCKINETTE COMPRESSION W9XL48IN IMPERVIOUS WITH PULL TAB

## (undated) DEVICE — DRAPE,SHOULDER,BEACH CHAIR,STERILE: Brand: MEDLINE

## (undated) DEVICE — ALCOHOL  RUBBING 70PERC ISOPROPYL  16-OZ

## (undated) DEVICE — SOLUTION IRRIG 3000ML LAC RINGERS ARTHROMTC PLAS CONT

## (undated) DEVICE — HYPODERMIC SAFETY NEEDLE: Brand: MAGELLAN

## (undated) DEVICE — 3M™ COBAN™ NL STERILE NON-LATEX SELF-ADHERENT WRAP, 2086S, 6 IN X 5 YD (15 CM X 4,5 M), 12 ROLLS/CASE: Brand: 3M™ COBAN™

## (undated) DEVICE — FORCEPS BX 240CM 2.4MM L NDL RAD JAW 4 M00513334

## (undated) DEVICE — CANNULA ARTHSCP L7CM DIA6MM CONIC TIP THRD NONSHIELDED DISP

## (undated) DEVICE — KIT SHLDR STBL MARCO FOR SPIDER LIMB POS

## (undated) DEVICE — GOWN,REINF,POLY,AURORA,XLNG/XL,STRL: Brand: MEDLINE

## (undated) DEVICE — SUTURE PDS + SZ 0 L27IN ABSRB VLT L36MM CT 1 1 2 CIR PDP340H

## (undated) DEVICE — STERILE POLYISOPRENE POWDER-FREE SURGICAL GLOVES: Brand: PROTEXIS

## (undated) DEVICE — CANNULA ARTHSCP L7CM DIA7MM TRNSLUC THRD FLX W/ NO SQUIRT

## (undated) DEVICE — SUTURE SUTTAPE L40IN DIA1.3MM NONABSORBABLE WHT BLU L26.5MM AR7500

## (undated) DEVICE — SUTURE NONABSORBABLE MONOFILAMENT 4-0 FS-2 18 IN ETHILON 662H

## (undated) DEVICE — STRIP,CLOSURE,WOUND,MEDI-STRIP,1/2X4: Brand: MEDLINE

## (undated) DEVICE — SHOULDER ARTHROSCOPY: Brand: MEDLINE INDUSTRIES, INC.

## (undated) DEVICE — TUBING PMP L16FT MAIN DISP FOR AR-6400 AR-6475